# Patient Record
Sex: FEMALE | Race: WHITE | Employment: OTHER | URBAN - METROPOLITAN AREA
[De-identification: names, ages, dates, MRNs, and addresses within clinical notes are randomized per-mention and may not be internally consistent; named-entity substitution may affect disease eponyms.]

---

## 2018-04-23 ENCOUNTER — HOSPITAL ENCOUNTER (OUTPATIENT)
Dept: CT IMAGING | Age: 58
Discharge: HOME | End: 2018-04-23

## 2018-04-23 DIAGNOSIS — M51.36 DISC DEGENERATION, LUMBAR: ICD-10-CM

## 2018-04-23 DIAGNOSIS — M47.816 LUMBAR SPONDYLOSIS: ICD-10-CM

## 2020-01-27 PROCEDURE — 88305 TISSUE EXAM BY PATHOLOGIST: CPT

## 2020-01-28 ENCOUNTER — HOSPITAL ENCOUNTER (OUTPATIENT)
Dept: LAB | Age: 60
Discharge: HOME | End: 2020-01-28

## 2021-11-17 ENCOUNTER — TRANSCRIBE ORDER (OUTPATIENT)
Dept: SCHEDULING | Age: 61
End: 2021-11-17

## 2021-11-17 DIAGNOSIS — Z12.31 SCREENING MAMMOGRAM FOR HIGH-RISK PATIENT: Primary | ICD-10-CM

## 2021-12-06 ENCOUNTER — HOSPITAL ENCOUNTER (OUTPATIENT)
Dept: MAMMOGRAPHY | Age: 61
Discharge: HOME | End: 2021-12-06
Attending: INTERNAL MEDICINE
Payer: MEDICARE

## 2021-12-06 DIAGNOSIS — Z12.31 SCREENING MAMMOGRAM FOR HIGH-RISK PATIENT: ICD-10-CM

## 2021-12-06 PROCEDURE — 77063 BREAST TOMOSYNTHESIS BI: CPT

## 2021-12-13 ENCOUNTER — HOSPITAL ENCOUNTER (OUTPATIENT)
Dept: MAMMOGRAPHY | Age: 61
Discharge: HOME | End: 2021-12-13
Attending: INTERNAL MEDICINE
Payer: MEDICARE

## 2021-12-13 DIAGNOSIS — R92.8 ABNORMAL SCREENING MAMMOGRAM: ICD-10-CM

## 2021-12-13 PROCEDURE — 77061 BREAST TOMOSYNTHESIS UNI: CPT

## 2021-12-13 PROCEDURE — 76642 ULTRASOUND BREAST LIMITED: CPT

## 2022-03-09 ENCOUNTER — APPOINTMENT (OUTPATIENT)
Dept: PHYSICAL THERAPY | Age: 62
End: 2022-03-09

## 2022-03-14 ENCOUNTER — HOSPITAL ENCOUNTER (OUTPATIENT)
Dept: PHYSICAL THERAPY | Age: 62
Discharge: HOME | End: 2022-03-14
Payer: MEDICARE

## 2022-03-14 PROCEDURE — 97110 THERAPEUTIC EXERCISES: CPT

## 2022-03-14 PROCEDURE — 97162 PT EVAL MOD COMPLEX 30 MIN: CPT

## 2022-03-14 PROCEDURE — 97140 MANUAL THERAPY 1/> REGIONS: CPT

## 2022-03-14 NOTE — THERAPY EVALUATION
Corrina Cain  : 1960  [place]  [place] [address] at [place] [id number], [address] [place] [address], [place], [address] [place] [address]  [phone]   [phone]       OUTPATIENT PHYSICAL THERAPY:Initial Assessment 3/14/2022    ICD-10: Pain in Right Shoulder (M25.511)  Adhesive capsulitis of right shoulder (M75.01)  Primary Osteoarthritis right shoulder (M19.011)  Precautions: Type II Diabetes, Hypertension- both controlled with medication; neurostimulator for bladder implanted ; history of bilateral shoulder surgeries  Allergies: Adhesive, Ambien [zolpidem], Keflex [cephalexin], and Morphine   TREATMENT PLAN:  Effective Dates: 3/14/2022 TO 2022 (60 days). Frequency/Duration: 2 times a week for 60 Day(s) MEDICAL/REFERRING DIAGNOSIS:  Adhesive capsulitis of right shoulder [M75.01]  Primary osteoarthritis, right shoulder [M19.011]   DATE OF ONSET: Patient reports history of R shoulder pain since a fall in 2019. Underwent R shoulder arthroscopy in 2021. REFERRING PHYSICIAN: Florentino Martinez MD MD Orders: Evaluate and Treat   Return MD Appointment: 2022     INITIAL ASSESSMENT:  Ms. Jamey Cain is a 63 y.o. female presenting to physical therapy with complaints of R shoulder pain and stiffness. She also reports some neck and L UE symptoms as well. History of L shoulder surgery x 2, and R shoulder arthroscopy in 2021. She reports no attending physical therapy after her R shoulder surgery due to circumstances with family and traveling. Patient and her  are now retired and back in [facility] for [facility] and she is wishing to improve her R shoulder pain, ROM, strength, and overall functional use. Patient with significant history of orthopedic pains, bladder neurostimulator, and neck/ back problems. Patient also reports multiple falls and ambulates with a straight cane in her R UE for stability.  She is eager to reduce her pain and improve her mobility in order to return to her prior level of independence and activities. Patient presents with increased pain, decreased strength, decreased ROM, decreased flexibility, impaired gait, impaired posture, impaired overhead reach, impaired transfer ability, decreased activity tolerance, and overall impaired functional mobility. Patient is a good candidate for skilled physical therapy interventions to include manual therapy, therapeutic exercise, transfer training, postural re-education, body mechanics training, and pain modalities as needed. PROBLEM LIST (Impacting functional limitations):  1. Decreased Strength  2. Decreased ADL/Functional Activities  3. Decreased Transfer Abilities  4. Decreased Ambulation Ability/Technique  5. Increased Pain  6. Decreased Activity Tolerance  7. Decreased Pacing Skills  8. Decreased Work Simplification/Energy Conservation Techniques  9. Decreased Flexibility/Joint Mobility  10. Edema/Girth INTERVENTIONS PLANNED: (Treatment may consist of any combination of the following)  1. Cold  2. Family Education  3. Heat  4. Home Exercise Program (HEP)  5. Manual Therapy  6. Range of Motion (ROM)  7. Therapeutic Activites  8. Therapeutic Exercise/Strengthening  9. Transfer Training  10. Trigger Point Dry Needling     GOALS: (Goals have been discussed and agreed upon with patient.)  Short-Term Functional Goals: Time Frame: 3/14/2022 to 4/13/2022  1. Patient demonstrates independence with home exercise program without verbal cueing provided by therapist.   2. Patient will report no more than 3/10 R shoulder pain at rest in order to demonstrate improved self pain control and tolerance. 3. Patient will be educated in and demonstrate improved upright posture including decreased forward head and shoulders to improve clearance for overhead activities.    4. Patient will improve R shoulder elevation to at least 150 degrees in order to improve overhead motion and reach.  Discharge Goals: Time Frame: 3/14/2022 to 5/13/2022  1. Patient will improve gross R shoulder strength to at least 4+/5 in order to improve safety with lifting, reaching, carrying, and overhead activities. 2. Patient will improve R functional internal rotation to L1 in order to improve ability to improve independence with donning/ doffing bra and washing back. 3. Patient will improve R functional external rotation to C7 in order to improve ability to wash her hair independently. 4. Patient will be able to sleep through the night without waking due to R shoulder pain in order to return to prior sleeping pattern for health and wellness. 5. Patient will be able to perform housework, including reaching into cabinets, with minimal to no complaints in order to return to full functional use of R UE.  6. Patient will improve Disability of the Arm, Shoulder, and Hand score to 25/55 from 36/55. Outcome Measure: Tool Used: Disabilities of the Arm, Shoulder and Hand (DASH) Questionnaire - Quick Version  Score:  Initial: 36/55  Most Recent: X/55 (Date: -- )   Interpretation of Score: The DASH is designed to measure the activities of daily living in person's with upper extremity dysfunction or pain. Each section is scored on a 1-5 scale, 5 representing the greatest disability. The scores of each section are added together for a total score of 55. Ambulatory/Rehab Services H2 Model Falls Risk Assessment   Risk Factors:       (5)  History of Recent Falls [w/in 3 months] Ability to Rise from Chair:       (1)  Pushes up, successful in one attempt   Falls Prevention Plan:       No modifications necessary   Total: (5 or greater = High Risk): 6   ©2010 [facility]. All Rights Reserved. [facility] Patent #[id number].  Federal Law prohibits the replication, distribution or use without written permission from [facility] [employer] Necessity:   · Patient is expected to demonstrate progress in strength, range of motion, coordination and functional technique to increase independence with dressing, bathing, reaching, and carrying items and improve safety during overhead activities, lifting, reaching, and return to prior activities including house work. · Skilled intervention continues to be required due to increased pain with decreased strength and functional mobility hindering return to prior activity level. Reason for Services/Other Comments:  · Patient continues to require skilled intervention due to decreased functional use of R UE with increased pain hindering quality of life and return to prior independence level. Total Evaluation Duration: 25 minutes    Rehabilitation Potential For Stated Goals: Good  Regarding Bj Floyd's therapy, I certify that the treatment plan above will be carried out by a therapist or under their direction. Thank you for this referral,  Roscoe Torres PT     Referring Physician Signature: Maryanne Cannon MD _______________________________ Date _____________             PAIN/SUBJECTIVE:    Initial: Pain Intensity 1: 5  Pain Location 1: Shoulder  Pain Orientation 1: Right  Post Session:  5/10    HISTORY:    History of Injury/Illness (Reason for Referral):  Ms. Lyle Smart is a 63 y.o. female presenting to physical therapy with complaints of R shoulder pain and stiffness. She also reports some neck and L UE symptoms as well. History of L shoulder surgery x 2, and R shoulder arthroscopy in April 2021. She reports no attending physical therapy after her R shoulder surgery due to circumstances with family and traveling. Patient and her  are now retired and back in [place] for good and she is wishing to improve her R shoulder pain, ROM, strength, and overall functional use. Patient with significant history of orthopedic pains, bladder neurostimulator, and neck/ back problems. Patient also reports multiple falls and ambulates with a straight cane in her R UE for stability. She is eager to reduce her pain and improve her mobility in order to return to her prior level of independence and activities. Patient presents with increased pain, decreased strength, decreased ROM, decreased flexibility, impaired gait, impaired posture, impaired overhead reach, impaired transfer ability, decreased activity tolerance, and overall impaired functional mobility. Past Medical History/Comorbidities:   Ms. Kb Orellana  has a past medical history of Asthma, Chronic pain, COVID, Depression, Diabetes ([address] Headache, Hypercholesterolemia, Hypertension, Lymphadenopathy, Neurogenic bladder, Psychotic disorder ([address] and PUD (peptic ulcer disease). Ms. Kb Orellana  has a past surgical history that includes hx heent; hx gyn; and hx urological.  Social History/Living Environment:     Patient lives in a private residence with her  who can help her out as needed. Requires assistance with dressing at times and bathing- mostly washing her hair. Prior Level of Function/Work/Activity:  Retired. Dominant Side:         RIGHT    Current Medications:        Current Outpatient Medications:     lisinopriL (PRINIVIL, ZESTRIL) 10 mg tablet, Take 10 mg by mouth daily. , Disp: , Rfl:     Desvenlafaxine (Pristiq) 100 mg Tb24, Take  by mouth daily. , Disp: , Rfl:     metoprolol succinate (Toprol XL) 100 mg tablet, Take  by mouth daily. , Disp: , Rfl:     chlorthalidone (HYGROTON) 25 mg tablet, Take  by mouth daily. , Disp: , Rfl:     potassium chloride SA (MICRO-K) 10 mEq capsule, Take 10 mEq by mouth two (2) times a day., Disp: , Rfl:     SITagliptin-metFORMIN (Janumet) 50-1,000 mg per tablet, Take 1 Tablet by mouth daily (with breakfast). , Disp: , Rfl:     traZODone (DESYREL) 100 mg tablet, Take 100 mg by mouth nightly., Disp: , Rfl:     rosuvastatin (Crestor) 20 mg tablet, Take 20 mg by mouth nightly., Disp: , Rfl:     Cetirizine (ZyrTEC) 10 mg cap, Take  by mouth., Disp: , Rfl:     tiZANidine (Zanaflex) 4 mg tablet, Take 4 mg by mouth three (3) times daily as needed for Muscle Spasm(s). Takes qhs, Disp: , Rfl:     rizatriptan (Maxalt-MLT) 5 mg rapid dissolve tablet, Take  by mouth., Disp: , Rfl:     Date Last Reviewed:  3/14/2022    Number of Personal Factors/Comorbidities that affect the Plan of Care:  Based on past medical history and co-morbidities 1-2: MODERATE COMPLEXITY    EXAMINATION:    Patient denies any headaches, changes in vision, dizziness, vertigo, nausea, drop attacks, black outs, tinnitus, dysphagia, dysarthria, LE symptoms or bowel/bladder dysfunction. Observation/Orthostatic Postural Assessment:          Patient with forward head, rounded shoulders, R scapular protraction and depression, increased muscle tension bilateral upper trapezius, L scapular winging noted. Palpation:          Moderate tenderness to palpation to gross R glenohumeral joint, AC joint, and scapular region. Significant tightness bilateral upper trapezius with trigger points and tenderness noted. ROM:          NT = not tested  AROM/ PROM Left (degrees) Right (degrees)   Shoulder Flexion 155 135   Shoulder Abduction 120 90   Shoulder Internal Rotation  70 60   Functional Internal Rotation T10 L5   Shoulder External Rotation 40 45   Functional External Rotation C7 Back of head     Strength: Motion Tested Left   (*/5) Right  (*/5)   Shoulder Flexion 5 3+   Scaption 5 4   Shoulder Abduction 5 4+   Shoulder Internal Rotation  5 4   Shoulder External Rotation 5 4-   Elbow Flexion 5 5   Elbow Extension 5 5     Special Tests:         Patient with decent rotator cuff strength R UE, but presents with painful arc, impingement symptoms, and signs and symptoms associated with adhesive capsulitis  Passive Accessory Motion:        Significant tightness through glenohumeral joint mobilizations all directions, worst posterior and inferior. Neurological Screen:              Myotomes: Key muscle strength testing through [facility]    Dermatomes: Sensation to light touch for bilateral UE is intact from C4 to T2. Reflexes: Biceps (C5): 2+ bilaterally         Brachioradialis (C6): 2+ bialterally        Triceps (C7): 2+ bilaterally  Functional Mobility:         Patient ambulates with straight cane in R UE for stability, but reports some R UE pain using cane. Difficulty dressing- donning/ doffing bra, bathing- washing back and hair, pain with driving, waking 2 times a night due to R shoulder pain, and limited with housework/ reaching overhead with R UE. Balance:          Sitting balance intact. Standing balance limited. HIGH FALL RISK. Body Structures Involved:  1. Nerves  2. Bones  3. Joints  4. Muscles  5. Ligaments Body Functions Affected:  1. Sensory/Pain  2. Neuromusculoskeletal  3. Movement Related Activities and Participation Affected:  1. General Tasks and Demands  2. Mobility  3. Self Care  4. Interpersonal Interactions and Relationships  5.  Community, Social and [place]    Number of elements (examined above) that affect the Plan of Care: 3: MODERATE COMPLEXITY    CLINICAL PRESENTATION:    Presentation:   Evolving clinical presentation with changing clinical characteristics: MODERATE COMPLEXITY    CLINICAL DECISION MAKING:    Use of outcome tool(s) and clinical judgement create a POC that gives a: Questionable prediction of patient's progress: MODERATE COMPLEXITY

## 2022-03-14 NOTE — PROGRESS NOTES
Kami Cain  : 1960  [place]  Secondary: [facility] at [facility]  [address] [place], [address] [place] [address], [place], [address] [place] [address]  [phone]   [phone]      OUTPATIENT PHYSICAL THERAPY: Daily Treatment Note 3/14/2022    ICD-10: Pain in Right Shoulder (M25.511)  Adhesive capsulitis of right shoulder (M75.01)  Primary Osteoarthritis right shoulder (M19.011)  Precautions: Type II Diabetes, Hypertension- both controlled with medication; neurostimulator for bladder implanted ; history of bilateral shoulder surgeries  Allergies: Adhesive, Ambien [zolpidem], Keflex [cephalexin], and Morphine   TREATMENT PLAN:  Effective Dates: 3/14/2022 TO 2022 (60 days). Frequency/Duration: 2 times a week for 60 Day(s) MEDICAL/REFERRING DIAGNOSIS:  Adhesive capsulitis of right shoulder [M75.01]  Primary osteoarthritis, right shoulder [M19.011]   DATE OF ONSET: Patient reports history of R shoulder pain since a fall in 2019. Underwent R shoulder arthroscopy in 2021. REFERRING PHYSICIAN: Eva Quintero MD MD Orders: Evaluate and Treat   Return MD Appointment: 2022     Pre-treatment Symptoms/Complaints:  Patient reports not being able to do physical therapy after her R shoulder surgery due to family circumstances and moving, but is now back in town and ready to decrease her pain and improve her R UE mobility and function. Pain: Initial: Pain Intensity 1: 5  Pain Location 1: Shoulder  Pain Orientation 1: Right  Post Session:  5/10   Medications Last Reviewed:  3/14/2022  Updated Objective Findings:  See evaluation note from today   TREATMENT:   THERAPEUTIC EXERCISE: (15 minutes):  Exercises per grid below to improve mobility, strength, coordination and dynamic movement of shoulder - right to improve functional lifting, carrying, reaching and overhead activites.   Required moderate visual, verbal, manual and tactile cues to promote proper body alignment, promote proper body posture and promote proper body mechanics. Progressed resistance, range, repetitions and complexity of movement as indicated. Date:  3/14/2022 Date:   Date:     Activity/Exercise Parameters Parameters Parameters   Wand external rotation Supine, 10 x 10 seconds     Wand flexion Supine, 10 x 10 seconds     Wand abduction Seated, 10 x 10 seconds     Backwards shoulder rolls X 10     Scapular retraction X 10                   Time spent with patient reviewing proper muscle recruitment and technique with exercises. MANUAL THERAPY: (10 minutes): Joint mobilization and Soft tissue mobilization was utilized and necessary because of the patient's restricted joint motion, painful spasm, loss of articular motion and restricted motion of soft tissue   Supine PROM to R shoulder all directions, to tolerance, to improve ROM   Gentle oscillations in neutral to decrease pain and improve relaxation   Grade II-II posterior and inferior R glenohumeral joint mobilizations to decrease pain and improve mobility  Consider soft tissue mobilization to gross R shoulder, upper trapezius, and lateral arm to decrease tightness and pain    MODALITIES: (0 minutes):      none today     HEP: As above; handouts given to patient for all exercises. Treatment/Session Summary:    · Response to Treatment:  Patient with good understanding and performance of exercises for home program. Reviewed safety with HEP. Advised patient that consistency is very important to improve ROM and we would being with ROM and posture and progress to strengthening as tolerated.   · Communication/Consultation:  Spoke at length with patient about plan of care, progression of therapy, and importance of HEP  · Equipment provided today:  Patient given handout with above exercises for home  · Recommendations/Intent for next treatment session: Next visit will focus on pain control, manual therapy and modalities as needed, progression of ROM and postural exercises, addition of strengthening and functional exercise as tolerated.     Total Treatment Billable Duration:  50 minutes: 25 evaluation, 15 manual, 10 therapeutic exercise  PT Patient Time In/Time Out  Time In: 1110  Time Out: 1 [address] Elizabeth Norris, PT

## 2022-03-15 ENCOUNTER — HOSPITAL ENCOUNTER (OUTPATIENT)
Dept: PHYSICAL THERAPY | Age: 62
Discharge: HOME | End: 2022-03-15
Payer: MEDICARE

## 2022-03-15 PROCEDURE — 97140 MANUAL THERAPY 1/> REGIONS: CPT

## 2022-03-15 PROCEDURE — 97110 THERAPEUTIC EXERCISES: CPT

## 2022-03-15 NOTE — PROGRESS NOTES
Tung Cain  : 1960  [place]  Secondary: [facility]  [address], [address] [place] [address], [place] [facility], [address] [place] [address]  [phone]   [phone]      OUTPATIENT PHYSICAL THERAPY: Daily Treatment Note 3/15/2022    ICD-10: Pain in Right Shoulder (M25.511)  Adhesive capsulitis of right shoulder (M75.01)  Primary Osteoarthritis right shoulder (M19.011)  Precautions: Type II Diabetes, Hypertension- both controlled with medication; neurostimulator for bladder implanted ; history of bilateral shoulder surgeries  Allergies: Adhesive, Ambien [zolpidem], Keflex [cephalexin], and Morphine   TREATMENT PLAN:  Effective Dates: 3/14/2022 TO 2022 (60 days). Frequency/Duration: 2 times a week for 60 Day(s) MEDICAL/REFERRING DIAGNOSIS:  Adhesive capsulitis of right shoulder [M75.01]  Primary osteoarthritis, right shoulder [M19.011]   DATE OF ONSET: Patient reports history of R shoulder pain since a fall in 2019. Underwent R shoulder arthroscopy in 2021. REFERRING PHYSICIAN: Ruth Ann Shearer MD MD Orders: Evaluate and Treat   Return MD Appointment: 2022     Pre-treatment Symptoms/Complaints:  Pt. Reported moderate shoulder pain due to tightness and weakness. Pain: Initial: Pain Intensity 1: 5  Pain Location 1: Shoulder  Pain Orientation 1: Left,Right  Post Session:  5/10 less tightness after session   Medications Last Reviewed:  3/15/2022  Updated Objective Findings:  Pt had clicking in right lateral elbow with shoulder flexion PROM   TREATMENT:   THERAPEUTIC EXERCISE: (30 minutes):  Exercises per grid below to improve mobility, strength, coordination and dynamic movement of shoulder - right to improve functional lifting, carrying, reaching and overhead activites.   Required moderate visual, verbal, manual and tactile cues to promote proper body alignment, promote proper body posture and promote proper body mechanics. Progressed resistance, range, repetitions and complexity of movement as indicated. Date:  3/14/2022 Date:  3/15/22 Date:     Activity/Exercise Parameters Parameters Parameters   Wand external rotation Supine, 10 x 10 seconds Supine x 20 reps     Wand flexion Supine, 10 x 10 seconds Supine x 20 reps     Wand abduction Seated, 10 x 10 seconds Seated x 20 reps    Backwards shoulder rolls X 10 X 20 reps     Scapular retraction X 10 X 20 reps     Wall pulley  X 5 mins flexion & Scaption    Wand protraction   With wand x 20 reps in supine     internal rotation stretch  Strap 4x30 sec hold     Shoulder shrugs  X 20 reps                         Time spent with patient reviewing proper muscle recruitment and technique with exercises. MANUAL THERAPY: (25 minutes): Joint mobilization and Soft tissue mobilization was utilized and necessary because of the patient's restricted joint motion, painful spasm, loss of articular motion and restricted motion of soft tissue   Supine PROM to R shoulder all directions, to tolerance, to improve ROM   Gentle oscillations in neutral to decrease pain and improve relaxation   Grade II-II posterior and inferior R glenohumeral joint mobilizations to decrease pain and improve mobility ( not today)    soft tissue mobilization to gross R shoulder, upper trapezius, and lateral arm to decrease tightness and pain    MODALITIES: (8 minutes):      Pt. received moist hot pack to bilateral shoulders to decrease muscular tightness     HEP: As above; handouts given to patient for all exercises. Treatment/Session Summary:    · Response to Treatment:  Pt. was compliant with all exercises and had no increase in pain . Pt. presented excellent range of motion in bilateral shoulders. .  · Communication/Consultation:  Spoke at length with patient about plan of care, progression of therapy, and importance of HEP  · Equipment provided today:  Patient given handout with above exercises for home  · Recommendations/Intent for next treatment session: Next visit will focus on pain control, manual therapy and modalities as needed, progression of ROM and postural exercises, addition of strengthening and functional exercise as tolerated.     Total Treatment Billable Duration:  55 mins  PT Patient Time In/Time Out  Time In: 0800  Time Out: 0905  Daylin Martínez PTA

## 2022-03-21 ENCOUNTER — HOSPITAL ENCOUNTER (OUTPATIENT)
Dept: PHYSICAL THERAPY | Age: 62
Discharge: HOME | End: 2022-03-21
Payer: MEDICARE

## 2022-03-21 PROCEDURE — 97140 MANUAL THERAPY 1/> REGIONS: CPT

## 2022-03-21 PROCEDURE — 97110 THERAPEUTIC EXERCISES: CPT

## 2022-03-21 NOTE — PROGRESS NOTES
Nancy Cain  : 1960  [place]  Secondary: [facility] at [facility]  [address] [place], [address] [place] [address], [place], [address] [place] [address]  [phone]   [phone]      OUTPATIENT PHYSICAL THERAPY: Daily Treatment Note 3/21/2022    ICD-10: Pain in Right Shoulder (M25.511)  Adhesive capsulitis of right shoulder (M75.01)  Primary Osteoarthritis right shoulder (M19.011)  Precautions: Type II Diabetes, Hypertension- both controlled with medication; neurostimulator for bladder implanted ; history of bilateral shoulder surgeries  Allergies: Adhesive, Ambien [zolpidem], Keflex [cephalexin], and Morphine   TREATMENT PLAN:  Effective Dates: 3/14/2022 TO 2022 (60 days). Frequency/Duration: 2 times a week for 60 Day(s) MEDICAL/REFERRING DIAGNOSIS:  Adhesive capsulitis of right shoulder [M75.01]  Primary osteoarthritis, right shoulder [M19.011]   DATE OF ONSET: Patient reports history of R shoulder pain since a fall in 2019. Underwent R shoulder arthroscopy in 2021. REFERRING PHYSICIAN: Sukhi Yanes MD MD Orders: Evaluate and Treat   Return MD Appointment: 2022     Pre-treatment Symptoms/Complaints:  Pt. Reported seeing Dr. Yarely Sharp today. Pt. Stated he wanted to perform surgery on her cervical spine by the end of March. Pt. Stated Dr. Valerio Villatoro wanted her to not use the cane anymore as it could hinder her recovery from C-spine surgery.      Pain: Initial: Pain Intensity 1: 6  Pain Location 1: Shoulder  Pain Orientation 1: Left,Right  Post Session:  5/10 less tightness after session   Medications Last Reviewed:  3/21/2022  Updated Objective Findings:  Pt. was able to ambulate today without cane with minimal antallgic gait Pattern   TREATMENT:   THERAPEUTIC EXERCISE: (30 minutes):  Exercises per grid below to improve mobility, strength, coordination and dynamic movement of shoulder - right to improve functional lifting, carrying, reaching and overhead activites. Required moderate visual, verbal, manual and tactile cues to promote proper body alignment, promote proper body posture and promote proper body mechanics. Progressed resistance, range, repetitions and complexity of movement as indicated. Date:  3/14/2022 Date:  3/15/22 Date:  3/21/22   Activity/Exercise Parameters Parameters Parameters   Wand external rotation Supine, 10 x 10 seconds Supine x 20 reps  Supine x 25 reps    Wand flexion Supine, 10 x 10 seconds Supine x 20 reps  Supine x 25 reps    Wand abduction Seated, 10 x 10 seconds Seated x 20 reps    Backwards shoulder rolls X 10 X 20 reps  X 20 reps    Scapular retraction X 10 X 20 reps  X 20 reps    Wall pulley  X 5 mins flexion & Scaption X 5 mins flexion & scaption   Wand protraction   With wand x 20 reps in supine With wand x 20 reps in supine    internal rotation stretch  Strap 4x30 sec hold  Strap 4x30 sec hold    Shoulder shrugs  X 20 reps  X 20 reps                        Time spent with patient reviewing proper muscle recruitment and technique with exercises. MANUAL THERAPY: (25 minutes): Joint mobilization and Soft tissue mobilization was utilized and necessary because of the patient's restricted joint motion, painful spasm, loss of articular motion and restricted motion of soft tissue   Supine PROM to R shoulder all directions, to tolerance, to improve ROM   Gentle oscillations in neutral to decrease pain and improve relaxation   Grade II-II posterior and inferior R glenohumeral joint mobilizations to decrease pain and improve mobility ( not today)    soft tissue mobilization to gross R shoulder, upper trapezius, and lateral arm to decrease tightness and pain    MODALITIES: (0 minutes):      none today     HEP: As above; handouts given to patient for all exercises.     Treatment/Session Summary:    · Response to Treatment:  Pt. was compliant with all exercises including gait with no holli..  · Communication/Consultation:  Spoke at length with patient about plan of care, progression of therapy, and importance of HEP  · Equipment provided today:  Patient given handout with above exercises for home  · Recommendations/Intent for next treatment session: Next visit will focus on pain control, manual therapy and modalities as needed, progression of ROM and postural exercises, addition of strengthening and functional exercise as tolerated.     Total Treatment Billable Duration:  55 mins  PT Patient Time In/Time Out  Time In: 1430  Time Out: Marlin [address], PTA

## 2022-03-21 NOTE — H&P (VIEW-ONLY)
[facility] NOTE:   History of Present Illness:    CC: Neck pain    Herlinda Blanco is a 63 y.o. right-hand-dominant female who presents today for evaluation of chronic neck pain and low back pain. The patient has been evaluated in multiple areas as she moves around between [place], [place] and [facility].  She has had pain in her neck since 2019 where she had a fall and has been progressively getting worse. Certas the base of her neck and goes up to her skull. She has numbness and tingling in both arms and the patient states that her left upper extremity is a predominantly affected limb. He has a history of 2 carpal tunnel surgeries performed approximately 10 years ago. She states the neck pain is constant but the arm numbness in positional and comes and goes. She states that things such as driving and resting her elbow tend to make her symptoms and numbness worse. She plays the piano professionally. *She states she has had stem cell injections, rhizotomies and steroid injections which have not seemed to provide her relief. She no longer wishes to take pain medication so no longer sees a pain specialist.  The patient has an MRI cervical spine without contrast performed at [facility] outpatient clinic on 1/12/2022 that demonstrates cervical spondylosis and degenerative disc disease at C5-C6 and C6-C7. At C5-C6 there is spinal stenosis with bilateral neuroforaminal stenosis in AP diameter the canal measures approximately 7.2 mm on axial images and 8.6 mm on sagittal.  At C5-C6 there is severe left-sided neuroforaminal stenosis and moderate right-sided neuroforaminal stenosis. At C6-C7 there is also spondylosis in effacement of the ventral CSF space without significant compression dorsally.   She has also worked with physical therapy multiple times throughout the year and is currently undergoing more therapy for her neck and shoulder and she had a recent shoulder arthroscopy. She denies that physical therapy has ever significant relieved her pain and discomfort. The patient underwent a EMG of the bilateral upper extremity that revealed evidence concerning for radiculopathy originating at either C6 or C7. Past Medical History:   Diagnosis Date    Asthma     Chronic pain     COVID     Depression     Diabetes ([address]     Headache     Hypercholesterolemia     Hypertension     Lymphadenopathy     [place] [id number]    Neurogenic bladder     Psychotic disorder (HCC)     bipolar    PUD (peptic ulcer disease)     hiatal hernia      Past Surgical History:   Procedure Laterality Date    HX GYN      KOFI    HX HEENT      tonsils parotid    HX UROLOGICAL      bladder stimulator-Segundo x5     Allergies   Allergen Reactions    Adhesive Other (comments)     blister    Ambien [Zolpidem] Other (comments)     Short term memory loss    Keflex [Cephalexin] Rash     itching    Morphine Other (comments)     Hallucinations, itching      Family History   Problem Relation Age of Onset    Hypertension Father     Diabetes Father     Heart Disease Father     Parkinson's Disease Father     Breast Cancer Neg Hx       Social History     Socioeconomic History    Marital status:      Spouse name: Not on file    Number of children: Not on file    Years of education: Not on file    Highest education level: Not on file   Occupational History    Not on file   Tobacco Use    Smoking status: Former Smoker     Years: 0.50    Smokeless tobacco: Never Used   Vaping Use    Vaping Use: Never used   Substance and Sexual Activity    Alcohol use:  Yes     Alcohol/week: 1.0 standard drink     Types: 1 Glasses of wine per week    Drug use: Never    Sexual activity: Not on file   Other Topics Concern    Not on file   Social History Narrative    Not on file     Social Determinants of Health     Financial Resource Strain:     Difficulty of Paying Living Expenses: Not on file   Food Insecurity:     Worried About Running Out of Food in the Last Year: Not on file    Moise of Food in the Last Year: Not on file   Transportation Needs:     Lack of Transportation (Medical): Not on file    Lack of Transportation (Non-Medical): Not on file   Physical Activity:     Days of Exercise per Week: Not on file    Minutes of Exercise per Session: Not on file   Stress:     Feeling of Stress : Not on file   Social Connections:     Frequency of Communication with Friends and Family: Not on file    Frequency of Social Gatherings with Friends and Family: Not on file    Attends [employer] Services: Not on file    Active Member of [address] [place] [address] [employer] or Organizations: Not on file    Attends Club or Organization Meetings: Not on file    Marital Status: Not on file   Intimate Partner Violence:     Fear of Current or Ex-Partner: Not on file    Emotionally Abused: Not on file    Physically Abused: Not on file    Sexually Abused: Not on file   Housing Stability:     Unable to Pay for Housing in the Last Year: Not on file    Number of [place] in the Last Year: Not on file    Unstable Housing in the Last Year: Not on file     Current Outpatient Medications   Medication Sig Dispense Refill    OTHER methylfolate qhs      lisinopriL (PRINIVIL, ZESTRIL) 10 mg tablet Take 10 mg by mouth daily.  Desvenlafaxine (Pristiq) 100 mg Tb24 Take  by mouth daily.  metoprolol succinate (Toprol XL) 100 mg tablet Take  by mouth daily.  chlorthalidone (HYGROTON) 25 mg tablet Take  by mouth daily.  potassium chloride SA (MICRO-K) 10 mEq capsule Take 10 mEq by mouth two (2) times a day.  SITagliptin-metFORMIN (Janumet) 50-1,000 mg per tablet Take 1 Tablet by mouth daily (with breakfast).  traZODone (DESYREL) 100 mg tablet Take 100 mg by mouth nightly.  rosuvastatin (Crestor) 20 mg tablet Take 20 mg by mouth nightly.  Cetirizine (ZyrTEC) 10 mg cap Take  by mouth.       tiZANidine (Zanaflex) 4 mg tablet Take 4 mg by mouth three (3) times daily as needed for Muscle Spasm(s). Takes qhs      rizatriptan (Maxalt-MLT) 5 mg rapid dissolve tablet Take  by mouth. There is no problem list on file for this patient. ROS  Review of Systems    Constitutional:                    No recent weight changes, fever, fatigue, sleep difficulties, loss of appetite   ENT/Mouth:  No hearing loss, ringing in the ears, chronic sinus problem, nose bleeds,sore throat, voice change, hoarseness, swollen glands in neck, or difficulties with chewing and swallowing. Cardiovascular:  No chest pain/angina pectoris, palpitations, swelling of feet/ankles/hands, or calf pain while walking. Respiratory: No chronic or frequent coughs, spitting up blood, shortness of breath, asthma, or wheezing. Gastrointestinal: No a bdominal pain, heartburn, nausea, vomiting, constipation, or frequent diarrhea     Genitourinary: POS frequent urination, burning or painful urination, or blood in urine     Musculoskeletal:   POS joint pain, stiffness/swelling, weakness of muscles, or muscle pain/cramp     Integument:   No rash/itching     Neurological:  No dizziness/vertigo, POS numbness/tingling sensation, tremors, weakness in limbs, frequent or recurring headaches, memory loss or confusion. Endocrine: POS excessive thirst or urination, heat or cold intolerance.                Physical Exam:  Vitals:    03/21/22 0817   BP: 114/65   Pulse: 68   Temp: 97.7 °F (36.5 °C)   TempSrc: Temporal   SpO2: 98%   Weight: 191 lb (86.6 kg)   Height: 5' 6\" (1.676 m)   PainSc:   6   PainLoc: Neck     General: No acute distress  Head normocephalic and atraumatic  Mood and affect appropriate  CV: Regular rate   Resp: No increased work of breathing  Skin: warm and dry   Awake, alert, and oriented   Speech fluent  Eyes open spontaneously   Face symmetric and tongue midline on protrusion  Sternocleidomastoid and trapezius 5/5  No mid-line cervical, thoracic, or lumbar tenderness to palpation   Patient with strength exam as follows:   Upper Extremities: Right Left      Deltoid  5 5    Biceps  5 5    Triceps 5 5      5 5   Hand Intrinsics  5 5  Wrist flexors/extensors  5 5     Lower Extremities:      Hip Flex 5 4+    Quads  5 5    Hamstrings 5 5    Dorsiflex 5 5    Plantarflex 5 5    EHL  5 5  Sensation intact to light touch and pin-prick   DTR 2+  No clonus or babinski present   No Lewis's sign present bilaterally   Gait ambulates with an antalgic gait and slight instability uses a cane for assistance    Assessment & Plan:  Romeo Cantu is a 63 y.o. right-hand-dominant female who presents today for evaluation of chronic neck pain and low back pain. I have independently reviewed and interpreted the patient's MRI cervical spine without contrast performed at [facility] outpatient clinic on 1/12/2022 that demonstrates cervical spondylosis and degenerative disc disease at C5-C6 and C6-C7. At C5-C6 there is spinal stenosis with bilateral neuroforaminal stenosis in AP diameter the canal measures approximately 7.2 mm on axial images and 8.6 mm on sagittal.  At C5-C6 there is severe left-sided neuroforaminal stenosis and moderate right-sided neuroforaminal stenosis. At C6-C7 there is also spondylosis in effacement of the ventral CSF space without significant compression dorsally. And a this time I discussed with the patient that she has failed conservative medical measures has evidence of significant neuroforaminal stenosis at C5-C6 that is worse on the left but also significant on the right and spinal stenosis. I believe she would benefit from a C5-C6 anterior cervical discectomy and fusion for decompression of the cervical spine and neuroforamen.  Anterior Cervical Fusion Procedures Consent: The relative risks of complication include but are not limited to infection, bleeding, spinal fluid leak, major vascular injury, increased pain, weakness, numbness, non fusion, instrumentation failure, need for re operation, postoperative hematoma that may require surgical evacuation, dysphonia, dysphagia, carotid, jugular, esophogeal and/or tracheal, paralysis, incontinence, impotence, heart attack, stroke and death were discussed with patient  And family members present. They have been provided the opportunity to ask any questions regarding the surgical procedure. The patient and family members present expressed understanding and agree to proceed with surgery. I also discussed with her the risk of dysphonia in the form of hoarseness to whether transient or permanent that is associated with anterior cervical surgery. Patient understands the risk and benefits and agrees to proceed with surgery as outlined above. ICD-10-CM ICD-9-CM    1. Cervical spinal stenosis  M48.02 723.0    2. Degenerative disc disease, cervical  M50.30 722.4    3. Cervical radiculopathy  M54.12 723.4    4. Neuroforaminal stenosis of cervical spine  M48.02 723.0        Notes are transcribed with [employer], a medical voice recording dictation service, and may contain minor errors. Maria T Rosario.  Diaz Dodson, [address] [place] [address]

## 2022-03-23 ENCOUNTER — HOSPITAL ENCOUNTER (OUTPATIENT)
Dept: PHYSICAL THERAPY | Age: 62
Discharge: HOME | End: 2022-03-23
Payer: MEDICARE

## 2022-03-23 PROCEDURE — 97140 MANUAL THERAPY 1/> REGIONS: CPT

## 2022-03-23 PROCEDURE — 97110 THERAPEUTIC EXERCISES: CPT

## 2022-03-23 NOTE — PROGRESS NOTES
Gio Webb Concepcion Ant  : 1960  [place]  [place] [address] at [facility]  [address] [place] [address], [address]  [phone]   [phone]      OUTPATIENT PHYSICAL THERAPY: Daily Treatment Note 3/23/2022    ICD-10: Pain in Right Shoulder (M25.511)  Adhesive capsulitis of right shoulder (M75.01)  Primary Osteoarthritis right shoulder (M19.011)  Precautions: Type II Diabetes, Hypertension- both controlled with medication; neurostimulator for bladder implanted ; history of bilateral shoulder surgeries  Allergies: Adhesive, Ambien [zolpidem], Keflex [cephalexin], and Morphine   TREATMENT PLAN:  Effective Dates: 3/14/2022 TO 2022 (60 days). Frequency/Duration: 2 times a week for 60 Day(s) MEDICAL/REFERRING DIAGNOSIS:  Adhesive capsulitis of right shoulder [M75.01]  Primary osteoarthritis, right shoulder [M19.011]   DATE OF ONSET: Patient reports history of R shoulder pain since a fall in 2019. Underwent R shoulder arthroscopy in 2021. REFERRING PHYSICIAN: Claribel Mejía MD MD Orders: Evaluate and Treat   Return MD Appointment: 2022     Pre-treatment Symptoms/Complaints:  Pt. Reports feeling better with therapy. Performing exercises daily. .     Pain: Initial: Pain Intensity 1: 5  Pain Location 1: Shoulder  Pain Orientation 1: Right  Post Session:  4/10   Medications Last Reviewed:  3/23/2022  Updated Objective Findings:  Improved ROM all planes   TREATMENT:   THERAPEUTIC EXERCISE: (30 minutes):  Exercises per grid below to improve mobility, strength, coordination and dynamic movement of shoulder - right to improve functional lifting, carrying, reaching and overhead activites. Required moderate visual, verbal, manual and tactile cues to promote proper body alignment, promote proper body posture and promote proper body mechanics.   Progressed resistance, range, repetitions and complexity of movement as indicated. Date:  3/22/2022 Date:  3/15/22 Date:  3/21/22   Activity/Exercise Parameters Parameters Parameters   Wand external rotation Supine, x 30 Supine x 20 reps  Supine x 25 reps    Wand flexion Supine, x 30 Supine x 20 reps  Supine x 25 reps    Wand abduction Standing x 20 Seated x 20 reps    Backwards shoulder rolls X 20 X 20 reps  X 20 reps    Scapular retraction X 30 X 20 reps  X 20 reps    Wall pulley 4 min X 5 mins flexion & Scaption X 5 mins flexion & scaption   Wand protraction   With wand x 20 reps in supine With wand x 20 reps in supine    internal rotation stretch Strap x 3 Strap 4x30 sec hold  Strap 4x30 sec hold    Shoulder shrugs  X 20 reps  X 20 reps                        Time spent with patient reviewing proper muscle recruitment and technique with exercises. MANUAL THERAPY: (23 minutes): Joint mobilization and Soft tissue mobilization was utilized and necessary because of the patient's restricted joint motion, painful spasm, loss of articular motion and restricted motion of soft tissue   Supine PROM to R shoulder all directions, to tolerance, to improve ROM   Gentle oscillations in neutral to decrease pain and improve relaxation   Grade II-II posterior and inferior R glenohumeral joint mobilizations to decrease pain and improve mobility ( not today)    soft tissue mobilization to gross R shoulder, upper trapezius, and lateral arm to decrease tightness and pain    MODALITIES: (0 minutes):      none today     HEP: As above; handouts given to patient for all exercises. Treatment/Session Summary:    · Response to Treatment:  Decreased pain and improved ROM .   · Communication/Consultation:  HEP and body mechaincs  · Equipment provided today:  None today  · Recommendations/Intent for next treatment session: Next visit will focus on pain control, manual therapy and modalities as needed, progression of ROM and postural exercises, addition of strengthening and functional exercise as tolerated.     Total Treatment Billable Duration:  53 mins  PT Patient Time In/Time Out  Time In: 0800  Time Out: [address], PTA

## 2022-03-29 ENCOUNTER — HOSPITAL ENCOUNTER (OUTPATIENT)
Dept: PHYSICAL THERAPY | Age: 62
Discharge: HOME | End: 2022-03-29
Payer: MEDICARE

## 2022-03-29 PROCEDURE — 97110 THERAPEUTIC EXERCISES: CPT

## 2022-03-29 PROCEDURE — 97140 MANUAL THERAPY 1/> REGIONS: CPT

## 2022-03-29 NOTE — PROGRESS NOTES
Narayan Cain  : 1960  [place]  Secondary: [facility]  [address], [address] [place] [address], [place], [address] [place] [address]  [phone]   [phone]      OUTPATIENT PHYSICAL THERAPY: Daily Treatment Note 3/29/2022    ICD-10: Pain in Right Shoulder (M25.511)  Adhesive capsulitis of right shoulder (M75.01)  Primary Osteoarthritis right shoulder (M19.011)  Precautions: Type II Diabetes, Hypertension- both controlled with medication; neurostimulator for bladder implanted ; history of bilateral shoulder surgeries  Allergies: Adhesive, Ambien [zolpidem], Keflex [cephalexin], and Morphine   TREATMENT PLAN:  Effective Dates: 3/14/2022 TO 2022 (60 days). Frequency/Duration: 2 times a week for 60 Day(s) MEDICAL/REFERRING DIAGNOSIS:  Adhesive capsulitis of right shoulder [M75.01]  Primary osteoarthritis, right shoulder [M19.011]   DATE OF ONSET: Patient reports history of R shoulder pain since a fall in 2019. Underwent R shoulder arthroscopy in 2021. REFERRING PHYSICIAN: Dajuan Rios MD MD Orders: Evaluate and Treat   Return MD Appointment: 2022     Pre-treatment Symptoms/Complaints:  Patient reports tweaking her shoulder on  with pulling part of a fountain and being sore yesterday. States she doesn't feel like she hurt anything, but could tell she did something different. Feeling better today.   Anterior cervical fusion scheduled for 2022    Pain: Initial: Pain Intensity 1: 4  Pain Location 1: Shoulder  Pain Orientation 1: Right  Post Session:  4/10   Medications Last Reviewed:  3/29/2022  Updated Objective Findings:  Improved scapular positioning with exercises and cuing    TREATMENT:   THERAPEUTIC EXERCISE: (25 minutes):  Exercises per grid below to improve mobility, strength, coordination and dynamic movement of shoulder - right to improve functional lifting, carrying, reaching and overhead activites. Required moderate visual, verbal, manual and tactile cues to promote proper body alignment, promote proper body posture and promote proper body mechanics. Progressed resistance, range, repetitions and complexity of movement as indicated. Date:  3/22/2022 Date:  3/29/2022 Date:  3/21/22   Activity/Exercise Parameters Parameters Parameters   Wand external rotation Supine, x 30 --- Supine x 25 reps    Wand flexion Supine, x 30 --- Supine x 25 reps    Wand abduction Standing x 20 Standing, x 10    Backwards shoulder rolls X 20 3 x 10 X 20 reps    Scapular retraction X 30 3 x 10  X 20 reps    Wall pulley 4 min --- X 5 mins flexion & scaption   Wand protraction   --- With wand x 20 reps in supine   Internal rotation stretch Strap x 3 Adduction x 10    Full x 10    Against door frame, x 10 Strap 4x30 sec hold    Shoulder shrugs  --- X 20 reps    Band row --- Yellow, 2 x 10    Band low row --- Yellow, 2 x 10    Bilateral external rotation --- Bolster on wall, yellow, 2 x 10    Wall slides --- X 10            Time spent with patient reviewing proper muscle recruitment and technique with exercises. MANUAL THERAPY: (30 minutes): Joint mobilization and Soft tissue mobilization was utilized and necessary because of the patient's restricted joint motion, painful spasm, loss of articular motion and restricted motion of soft tissue   Supine PROM to R shoulder all directions, to tolerance, to improve ROM   Gentle oscillations in neutral to decrease pain and improve relaxation   Grade II-II posterior and inferior R glenohumeral joint mobilizations to decrease pain and improve mobility    Soft tissue mobilization to gross R shoulder, upper trapezius, and lateral arm to decrease tightness and pain    MODALITIES: (0 minutes):      none today     HEP: As above; handouts given to patient for all exercises.     Treatment/Session Summary:    · Response to Treatment:  Patient tolerated gentle scapular strengthening well with no complaints of  increased neck or shoulder pain. Reviewed HEP and safety at home. .  · Communication/Consultation:  None today  · Equipment provided today:  None today  · Recommendations/Intent for next treatment session: Next visit will focus on pain control, manual therapy and modalities as needed, progression of ROM and postural exercises, addition of strengthening and functional exercise as tolerated.     Total Treatment Billable Duration:  55 minutes  PT Patient Time In/Time Out  Time In: 0802  Time Out: 4089  Craig Padilla, PT

## 2022-03-30 ENCOUNTER — HOSPITAL ENCOUNTER (OUTPATIENT)
Dept: SURGERY | Age: 62
Discharge: HOME | End: 2022-03-30
Attending: NEUROLOGICAL SURGERY
Payer: MEDICARE

## 2022-03-30 VITALS
RESPIRATION RATE: 20 BRPM | TEMPERATURE: 97 F | SYSTOLIC BLOOD PRESSURE: 104 MMHG | WEIGHT: 192.6 LBS | HEART RATE: 78 BPM | HEIGHT: 67 IN | OXYGEN SATURATION: 98 % | BODY MASS INDEX: 30.23 KG/M2 | DIASTOLIC BLOOD PRESSURE: 70 MMHG

## 2022-03-30 LAB
ANION GAP SERPL CALC-SCNC: 5 MMOL/L (ref 7–16)
APPEARANCE UR: CLEAR
BACTERIA SPEC CULT: NORMAL
BASOPHILS # BLD: 0 K/UL (ref 0–0.2)
BASOPHILS NFR BLD: 0 % (ref 0–2)
BILIRUB UR QL: NEGATIVE
BUN SERPL-MCNC: 14 MG/DL (ref 6–23)
CALCIUM SERPL-MCNC: 8.8 MG/DL (ref 8.3–10.4)
CHLORIDE SERPL-SCNC: 104 MMOL/L (ref 98–107)
CO2 SERPL-SCNC: 30 MMOL/L (ref 21–32)
COLOR UR: YELLOW
CREAT SERPL-MCNC: 0.73 MG/DL (ref 0.6–1)
DIFFERENTIAL METHOD BLD: ABNORMAL
EOSINOPHIL # BLD: 0.1 K/UL (ref 0–0.8)
EOSINOPHIL NFR BLD: 3 % (ref 0.5–7.8)
ERYTHROCYTE [DISTWIDTH] IN BLOOD BY AUTOMATED COUNT: 14.4 % (ref 11.9–14.6)
EST. AVERAGE GLUCOSE BLD GHB EST-MCNC: 111 MG/DL
GLUCOSE SERPL-MCNC: 79 MG/DL (ref 65–100)
GLUCOSE UR STRIP.AUTO-MCNC: NEGATIVE MG/DL
HBA1C MFR BLD: 5.5 % (ref 4.2–6.3)
HCT VFR BLD AUTO: 36.4 % (ref 35.8–46.3)
HGB BLD-MCNC: 11.5 G/DL (ref 11.7–15.4)
HGB UR QL STRIP: NEGATIVE
IMM GRANULOCYTES # BLD AUTO: 0 K/UL (ref 0–0.5)
IMM GRANULOCYTES NFR BLD AUTO: 0 % (ref 0–5)
KETONES UR QL STRIP.AUTO: NEGATIVE MG/DL
LEUKOCYTE ESTERASE UR QL STRIP.AUTO: NEGATIVE
LYMPHOCYTES # BLD: 1.6 K/UL (ref 0.5–4.6)
LYMPHOCYTES NFR BLD: 34 % (ref 13–44)
MCH RBC QN AUTO: 27.1 PG (ref 26.1–32.9)
MCHC RBC AUTO-ENTMCNC: 31.6 G/DL (ref 31.4–35)
MCV RBC AUTO: 85.8 FL (ref 79.6–97.8)
MONOCYTES # BLD: 0.4 K/UL (ref 0.1–1.3)
MONOCYTES NFR BLD: 9 % (ref 4–12)
NEUTS SEG # BLD: 2.6 K/UL (ref 1.7–8.2)
NEUTS SEG NFR BLD: 54 % (ref 43–78)
NITRITE UR QL STRIP.AUTO: NEGATIVE
NRBC # BLD: 0 K/UL (ref 0–0.2)
PH UR STRIP: 5 [PH] (ref 5–9)
PLATELET # BLD AUTO: 235 K/UL (ref 150–450)
PMV BLD AUTO: 9.6 FL (ref 9.4–12.3)
POTASSIUM SERPL-SCNC: 3.6 MMOL/L (ref 3.5–5.1)
PROT UR STRIP-MCNC: NEGATIVE MG/DL
RBC # BLD AUTO: 4.24 M/UL (ref 4.05–5.2)
SERVICE CMNT-IMP: NORMAL
SODIUM SERPL-SCNC: 139 MMOL/L (ref 136–145)
SP GR UR REFRACTOMETRY: 1.01 (ref 1–1.02)
UROBILINOGEN UR QL STRIP.AUTO: 0.2 EU/DL (ref 0.2–1)
WBC # BLD AUTO: 4.8 K/UL (ref 4.3–11.1)

## 2022-03-30 PROCEDURE — 81003 URINALYSIS AUTO W/O SCOPE: CPT

## 2022-03-30 PROCEDURE — 80048 BASIC METABOLIC PNL TOTAL CA: CPT

## 2022-03-30 PROCEDURE — 77030027138 HC INCENT SPIROMETER -A

## 2022-03-30 PROCEDURE — 85025 COMPLETE CBC W/AUTO DIFF WBC: CPT

## 2022-03-30 PROCEDURE — 83036 HEMOGLOBIN GLYCOSYLATED A1C: CPT

## 2022-03-30 PROCEDURE — 87641 MR-STAPH DNA AMP PROBE: CPT

## 2022-03-30 RX ORDER — ALBUTEROL SULFATE 90 UG/1
2 AEROSOL, METERED RESPIRATORY (INHALATION)
COMMUNITY
Start: 2021-10-07

## 2022-03-30 RX ORDER — LORAZEPAM 0.5 MG/1
TABLET ORAL
COMMUNITY

## 2022-03-30 RX ORDER — CYANOCOBALAMIN 1000 UG/ML
1000 INJECTION, SOLUTION INTRAMUSCULAR; SUBCUTANEOUS
COMMUNITY
Start: 2022-02-02

## 2022-03-30 RX ORDER — TRAMADOL HYDROCHLORIDE 100 MG/1
100 TABLET, COATED ORAL AS NEEDED
COMMUNITY
End: 2022-04-14

## 2022-03-30 RX ORDER — DICLOFENAC SODIUM 10 MG/G
2 GEL TOPICAL AS NEEDED
COMMUNITY
Start: 2022-02-02 | End: 2022-04-14

## 2022-03-30 RX ORDER — ASPIRIN 81 MG/1
81 TABLET ORAL
COMMUNITY

## 2022-03-30 RX ORDER — DOXYCYCLINE HYCLATE 100 MG
TABLET ORAL
Status: ON HOLD | COMMUNITY
End: 2022-04-12

## 2022-03-30 RX ORDER — DOCUSATE SODIUM 100 MG/1
100 CAPSULE, LIQUID FILLED ORAL
COMMUNITY

## 2022-03-30 RX ORDER — TRIAMCINOLONE ACETONIDE 55 UG/1
2 SPRAY, METERED NASAL AS NEEDED
COMMUNITY

## 2022-03-30 RX ORDER — ERGOCALCIFEROL 1.25 MG/1
50000 CAPSULE ORAL
COMMUNITY
Start: 2022-02-02

## 2022-03-30 RX ORDER — ALBUTEROL SULFATE 0.83 MG/ML
SOLUTION RESPIRATORY (INHALATION)
COMMUNITY
Start: 2021-10-07

## 2022-03-30 RX ORDER — LIDOCAINE 50 MG/G
PATCH TOPICAL
COMMUNITY

## 2022-03-30 RX ORDER — LEVOMEFOLATE CALCIUM 15 MG
15 TABLET ORAL DAILY
COMMUNITY
Start: 2022-02-02

## 2022-03-30 RX ORDER — HYDROCODONE BITARTRATE AND ACETAMINOPHEN 5; 325 MG/1; MG/1
1 TABLET ORAL
COMMUNITY
End: 2022-04-14

## 2022-03-30 RX ORDER — POLYETHYLENE GLYCOL 3350 17 G/17G
POWDER, FOR SOLUTION ORAL AS NEEDED
COMMUNITY

## 2022-03-30 RX ORDER — FLUTICASONE PROPIONATE AND SALMETEROL 250; 50 UG/1; UG/1
POWDER RESPIRATORY (INHALATION)
COMMUNITY
Start: 2022-02-02

## 2022-03-30 RX ORDER — PHENOL/SODIUM PHENOLATE
20 AEROSOL, SPRAY (ML) MUCOUS MEMBRANE DAILY
COMMUNITY

## 2022-03-30 RX ORDER — METFORMIN HYDROCHLORIDE 500 MG/1
500 TABLET, EXTENDED RELEASE ORAL
COMMUNITY
End: 2022-03-30

## 2022-03-30 NOTE — PERIOP NOTES
Recent Results (from the past 12 hour(s))   CBC WITH AUTOMATED DIFF    Collection Time: 03/30/22 10:00 AM   Result Value Ref Range    WBC 4.8 4.3 - 11.1 K/uL    RBC 4.24 4.05 - 5.2 M/uL    HGB 11.5 (L) 11.7 - 15.4 g/dL    HCT 36.4 35.8 - 46.3 %    MCV 85.8 79.6 - 97.8 FL    MCH 27.1 26.1 - 32.9 PG    MCHC 31.6 31.4 - 35.0 g/dL    RDW 14.4 11.9 - 14.6 %    PLATELET 079 176 - 574 K/uL    MPV 9.6 9.4 - 12.3 FL    ABSOLUTE NRBC 0.00 0.0 - 0.2 K/uL    DF AUTOMATED      NEUTROPHILS 54 43 - 78 %    LYMPHOCYTES 34 13 - 44 %    MONOCYTES 9 4.0 - 12.0 %    EOSINOPHILS 3 0.5 - 7.8 %    BASOPHILS 0 0.0 - 2.0 %    IMMATURE GRANULOCYTES 0 0.0 - 5.0 %    ABS. NEUTROPHILS 2.6 1.7 - 8.2 K/UL    ABS. LYMPHOCYTES 1.6 0.5 - 4.6 K/UL    ABS. MONOCYTES 0.4 0.1 - 1.3 K/UL    ABS. EOSINOPHILS 0.1 0.0 - 0.8 K/UL    ABS. BASOPHILS 0.0 0.0 - 0.2 K/UL    ABS. IMM.  GRANS. 0.0 0.0 - 0.5 K/UL   METABOLIC PANEL, BASIC    Collection Time: 03/30/22 10:00 AM   Result Value Ref Range    Sodium 139 136 - 145 mmol/L    Potassium 3.6 3.5 - 5.1 mmol/L    Chloride 104 98 - 107 mmol/L    CO2 30 21 - 32 mmol/L    Anion gap 5 (L) 7 - 16 mmol/L    Glucose 79 65 - 100 mg/dL    BUN 14 6 - 23 MG/DL    Creatinine 0.73 0.6 - 1.0 MG/DL    GFR est AA >60 >60 ml/min/1.73m2    GFR est non-AA >60 >60 ml/min/1.73m2    Calcium 8.8 8.3 - 10.4 MG/DL   URINALYSIS W/ RFLX MICROSCOPIC    Collection Time: 03/30/22 10:00 AM   Result Value Ref Range    Color YELLOW      Appearance CLEAR      Specific gravity 1.015 1.001 - 1.023      pH (UA) 5.0 5.0 - 9.0      Protein Negative NEG mg/dL    Glucose Negative mg/dL    Ketone Negative NEG mg/dL    Bilirubin Negative NEG      Blood Negative NEG      Urobilinogen 0.2 0.2 - 1.0 EU/dL    Nitrites Negative NEG      Leukocyte Esterase Negative NEG     HEMOGLOBIN A1C WITH EAG    Collection Time: 03/30/22 10:00 AM   Result Value Ref Range    Hemoglobin A1c 5.5 4.20 - 6.30 %    Est. average glucose 111 mg/dL   MSSA/MRSA SC BY PCR, NASAL SWAB Collection Time: 03/30/22 10:00 AM    Specimen: Nasal swab   Result Value Ref Range    Special Requests: NO SPECIAL REQUESTS      Culture result:        SA target not detected. A MRSA NEGATIVE, SA NEGATIVE test result does not preclude MRSA or SA nasal colonization. Labs reviewed. Routing results to surgeon.

## 2022-03-30 NOTE — PERIOP NOTES
Patient verified name, , and surgery as listed in [facility]. Patient provided medical/health information and PTA medications to the best of their ability. TYPE  CASE: 1B per spine protocol listing one level ACDF as 1B  Orders per surgeon: not received as of 10:13 a.m. 3/30/2022  Labs per surgeon: MSSA/MRSA per protocol. Results: pending  Labs per anesthesia protocol: CBC with diff, BMP, UA, HgbA1C. Results: pending  EKG:  Not indicated for 1B surgery per anesthesia protocol     Patient states a prior history of afib during visit to [facility] in [place]: [phone]. Has not seen cardiologist since 2018. No c/o CP and no c/o SOB. Called to [facility] at number above and LVM with Medical Records department asking for 2018 EKG and office notes. Per Ashanti  at office, they are not part of a local medical system that uses Epic documentation. Nasal Swab collected per MD order. Patient provided with and instructed on education handouts including Guide to Surgery, blood transfusions, pain management, and hand hygiene for the family and community, and [facility] brochure. Road to Recovery Spine surgery patient guide given. Instructed on incentive spirometry with return demonstration. Hibiclens and instructions given per hospital policy. Original medication prescription bottles visualized during patient appointment. Patient teach back successful and patient demonstrates knowledge of instruction.

## 2022-03-30 NOTE — PERIOP NOTES
PLEASE CONTINUE TAKING ALL PRESCRIPTION MEDICATIONS UP TO THE DAY OF SURGERY UNLESS OTHERWISE DIRECTED BELOW. DISCONTINUE all vitamins and supplements 7 days prior to surgery. DISCONTINUE Non-Steriodal Anti-Inflammatory (NSAIDS) such as Advil and Aleve 5 days prior to surgery. Home Medications to take  the day of surgery   Bring inhalers and use daily inhalers day of surgery  Pristiq/desvelafaxine  Metoprolol/Lopressor  **omeprazole/Prilosec  May take pain medication if needed  May take lorazepam if needed  May take maxalt if needed  May take tramadol if needed   May use nasacort if needed             Home Medications   to Hold   DISCONTINUE all vitamins and supplements 7 days prior to surgery. DISCONTINUE Non-Steriodal Anti-Inflammatory (NSAIDS) such as Advil and Aleve 5 days prior to surgery. Janumet  Lisinopril  l-methylfolate  Potassium       Comments                  Please do not bring home medications with you on the day of surgery unless otherwise directed by your nurse. If you are instructed to bring home medications, please give them to your nurse as they will be administered by the nursing staff. If you have any questions, please call [address] [place] [address] [phone] or [facility] [phone]. A copy of this note was provided to the patient for reference. How to Use Your Incentive Spirometer       About Your Incentive Spirometer  An incentive spirometer is a device that will expand your lungs by helping you to breathe more deeply and fully. The parts of your incentive spirometer are labeled in Figure 1. Using your incentive spirometer  When youre using your incentive spirometer, make sure to breathe through your mouth. If you breathe through your nose, the incentive spirometer wont work properly. You can hold your nose if you have trouble. DO NOT BLOW INTO THE DEVICE. If you feel dizzy at any time, stop and rest. Try again at a later time.   1. Sit upright in a chair or in bed. Hold the incentive spirometer at eye level. 2. Put the mouthpiece in your mouth and close your lips tightly around it. Slowly breathe out (exhale) completely. 3. Breathe in (inhale) slowly through your mouth as deeply as you can. As you take the breath, you will see the piston rise inside the large column. While the piston rises, the indicator on the right should move upwards. It should stay in between the 2 arrows (see Figure 1). 4. Try to get the piston as high as you can, while keeping the indicator between the arrows. If the indicator doesnt stay between the arrows, youre breathing either too fast or too slow. 5. When you get it as high as you can, hold your breath for 10 seconds, or as long as possible. While youre holding your breath, the piston will slowly fall to the base of the spirometer. 6. Once the piston reaches the bottom of the spirometer, breathe out slowly through your mouth. Rest for a few seconds. 7. Repeat 10 times. Try to get the piston to the same level with each breath. 8. After each set of 10 breaths, try to cough as coughing will help loosen or clear any mucus in your lungs. 9. Put the marker at the level the piston reached on your incentive spirometer. This will be your goal next time. Repeat these steps every hour that youre awake.   Cover the mouthpiece of the incentive spirometer when you arent using it

## 2022-03-31 ENCOUNTER — HOSPITAL ENCOUNTER (OUTPATIENT)
Dept: PHYSICAL THERAPY | Age: 62
Discharge: HOME | End: 2022-03-31
Payer: MEDICARE

## 2022-03-31 PROCEDURE — 97140 MANUAL THERAPY 1/> REGIONS: CPT

## 2022-03-31 PROCEDURE — 97110 THERAPEUTIC EXERCISES: CPT

## 2022-03-31 NOTE — PERIOP NOTES
The following records have been requested from [facility]:       [facility]    Please send EKG, ECHO, Stress, and last office visit via fax to [phone]. Thank you!

## 2022-03-31 NOTE — PROGRESS NOTES
Kristan Cain  : 1960  [place]  Secondary: [facility]  [address], [address] [place] [address], [facility], [address] [place] [address]  [phone]   [phone]      OUTPATIENT PHYSICAL THERAPY: Daily Treatment Note 3/31/2022    ICD-10: Pain in Right Shoulder (M25.511)  Adhesive capsulitis of right shoulder (M75.01)  Primary Osteoarthritis right shoulder (M19.011)  Precautions: Type II Diabetes, Hypertension- both controlled with medication; neurostimulator for bladder implanted ; history of bilateral shoulder surgeries  Allergies: Adhesive, Ambien [zolpidem], Keflex [cephalexin], and Morphine   TREATMENT PLAN:  Effective Dates: 3/14/2022 TO 2022 (60 days). Frequency/Duration: 2 times a week for 60 Day(s) MEDICAL/REFERRING DIAGNOSIS:  Adhesive capsulitis of right shoulder [M75.01]  Primary osteoarthritis, right shoulder [M19.011]   DATE OF ONSET: Patient reports history of R shoulder pain since a fall in 2019. Underwent R shoulder arthroscopy in 2021. REFERRING PHYSICIAN: Marina Aparicio MD MD Orders: Evaluate and Treat   Return MD Appointment: 2022     Pre-treatment Symptoms/Complaints:  Pt. Reported having surgery next  on cervical spine  Anterior cervical fusion scheduled for 2022    Pain: Initial: Pain Intensity 1: 4  Pain Location 1: Neck,Shoulder  Pain Orientation 1: Left,Right  Post Session:  3/10   Medications Last Reviewed:  3/31/2022  Updated Objective Findings:  Pt, presented good cervical range of motion today    TREATMENT:   THERAPEUTIC EXERCISE: (40 minutes):  Exercises per grid below to improve mobility, strength, coordination and dynamic movement of shoulder - right to improve functional lifting, carrying, reaching and overhead activites.   Required moderate visual, verbal, manual and tactile cues to promote proper body alignment, promote proper body posture and promote proper body mechanics. Progressed resistance, range, repetitions and complexity of movement as indicated. Date:  3/22/2022 Date:  3/29/2022 Date:  3/31/22   Activity/Exercise Parameters Parameters Parameters   UBE    Level 4 x 4 mins fwd & 4 mins bkwd   TRX   Flexion & extension x 20 reps    Wand external rotation Supine, x 30 --- Supine x 25   reps    Wand flexion Supine, x 30 --- Supine x 25 reps    Wand abduction Standing x 20 Standing, x 10 Standing x 10 reps    Backwards shoulder rolls X 20 3 x 10 X 20 reps    Scapular retraction X 30 3 x 10  X 20 reps    Wall pulley 4 min --- X 5 mins flexion & scaption   Wand protraction   --- With wand x 20 reps in supine   Internal rotation stretch Strap x 3 Adduction x 10    Full x 10    Against door frame, x 10 Strap 4x30 sec hold    Shoulder shrugs  --- X 20 reps    Band row --- Yellow, 2 x 10 Yellow band 2x10 reps 5 sec hold each   Band low row --- Yellow, 2 x 10 Yellow band 2x10 reps 5 sec hold each   Bilateral external rotation --- Bolster on wall, yellow, 2 x 10 Bolster on wall yellow 2x10    Wall slides --- X 10 X 10 reps    Bilateral shoulder internal and external rotation    Yellow band 2x10 reps each yellow band with towel roll      Time spent with patient reviewing proper muscle recruitment and technique with exercises. MANUAL THERAPY: (15 minutes): Joint mobilization and Soft tissue mobilization was utilized and necessary because of the patient's restricted joint motion, painful spasm, loss of articular motion and restricted motion of soft tissue   Grade II-II posterior and inferior R glenohumeral joint mobilizations to decrease pain and improve mobility    Soft tissue mobilization to gross R shoulder, upper trapezius, and lateral arm to decrease tightness and pain    MODALITIES: (0 minutes):      none today     HEP: As above; handouts given to patient for all exercises.     Treatment/Session Summary:    · Response to Treatment:  Pt. was compliant with all exercise and reported decrease tightness and pain at the end of session. · Communication/Consultation:  None today  · Equipment provided today:  None today  · Recommendations/Intent for next treatment session: Next visit will focus on pain control, manual therapy and modalities as needed, progression of ROM and postural exercises, addition of strengthening and functional exercise as tolerated.     Total Treatment Billable Duration:  55 minutes  PT Patient Time In/Time Out  Time In: 0800  Time Out: 0900  Sara Hernandez, JOLENE

## 2022-04-04 ENCOUNTER — HOSPITAL ENCOUNTER (OUTPATIENT)
Dept: PHYSICAL THERAPY | Age: 62
Discharge: HOME | End: 2022-04-04
Payer: MEDICARE

## 2022-04-04 PROCEDURE — 97110 THERAPEUTIC EXERCISES: CPT

## 2022-04-04 PROCEDURE — 97140 MANUAL THERAPY 1/> REGIONS: CPT

## 2022-04-04 NOTE — THERAPY DISCHARGE
Sangita Cain  : 1960  [place]  Secondary: [facility]  [address], [address] [place] [address], [place], [address] [place] [address]  [phone]   [phone]       OUTPATIENT PHYSICAL THERAPY:Progress Report and Discharge 2022    ICD-10: Pain in Right Shoulder (M25.511)  Adhesive capsulitis of right shoulder (M75.01)  Primary Osteoarthritis right shoulder (M19.011)  Precautions: Type II Diabetes, Hypertension- both controlled with medication; neurostimulator for bladder implanted ; history of bilateral shoulder surgeries  Allergies: Crab, Adhesive, Ambien [zolpidem], Keflex [cephalexin], and Morphine   TREATMENT PLAN:  Effective Dates: 3/14/2022 TO 2022 (60 days). Frequency/Duration: 2 times a week for 60 Day(s) MEDICAL/REFERRING DIAGNOSIS:  Adhesive capsulitis of right shoulder [M75.01]  Primary osteoarthritis, right shoulder [M19.011]   DATE OF ONSET: Patient reports history of R shoulder pain since a fall in 2019. Underwent R shoulder arthroscopy in 2021. REFERRING PHYSICIAN: Judy Watson MD MD Orders: Evaluate and Treat   Return MD Appointment: 2022     INITIAL ASSESSMENT:  Ms. Jamey Cain is a 63 y.o. female presenting to physical therapy with complaints of R shoulder pain and stiffness. She also reports some neck and L UE symptoms as well. History of L shoulder surgery x 2, and R shoulder arthroscopy in 2021. She reports no attending physical therapy after her R shoulder surgery due to circumstances with family and traveling. Patient and her  are now retired and back in [facility] for [facility] and she is wishing to improve her R shoulder pain, ROM, strength, and overall functional use. Patient with significant history of orthopedic pains, bladder neurostimulator, and neck/ back problems. Patient also reports multiple falls and ambulates with a straight cane in her R UE for stability.  She is eager to reduce her pain and improve her mobility in order to return to her prior level of independence and activities. Patient presents with increased pain, decreased strength, decreased ROM, decreased flexibility, impaired gait, impaired posture, impaired overhead reach, impaired transfer ability, decreased activity tolerance, and overall impaired functional mobility. Patient is a good candidate for skilled physical therapy interventions to include manual therapy, therapeutic exercise, transfer training, postural re-education, body mechanics training, and pain modalities as needed. PROGRESS NOTE/ DISCHARGE 4/4/2022: Patient has been seen for 7 sessions of physical therapy from 3/14/2022 to 4/4/2022. She reports her shoulder feeling better since starting therapy with improved ROM and use of her R UE. She needs to continue working on strength once she is able. Patient is scheduled for an anterior cervical fusion on 4/7/2022 and is discharging from current plan of care at this time. PROBLEM LIST (Impacting functional limitations):  1. Decreased Strength  2. Decreased ADL/Functional Activities  3. Decreased Transfer Abilities  4. Decreased Ambulation Ability/Technique  5. Increased Pain  6. Decreased Activity Tolerance  7. Decreased Pacing Skills  8. Decreased Work Simplification/Energy Conservation Techniques  9. Decreased Flexibility/Joint Mobility  10. Edema/Girth INTERVENTIONS PLANNED: (Treatment may consist of any combination of the following)  1. Cold  2. Family Education  3. Heat  4. Home Exercise Program (HEP)  5. Manual Therapy  6. Range of Motion (ROM)  7. Therapeutic Activites  8. Therapeutic Exercise/Strengthening  9. Transfer Training  10. Trigger Point Dry Needling     GOALS: (Goals have been discussed and agreed upon with patient.)  Short-Term Functional Goals: Time Frame: 3/14/2022 to 4/13/2022  1.  Patient demonstrates independence with home exercise program without verbal cueing provided by therapist. -GOAL MET  2. Patient will report no more than 3/10 R shoulder pain at rest in order to demonstrate improved self pain control and tolerance. -GOAL MET  3. Patient will be educated in and demonstrate improved upright posture including decreased forward head and shoulders to improve clearance for overhead activities. -GOAL MET  4. Patient will improve R shoulder elevation to at least 150 degrees in order to improve overhead motion and reach. -GOAL MET  Discharge Goals: Time Frame: 3/14/2022 to 5/13/2022  1. Patient will improve gross R shoulder strength to at least 4+/5 in order to improve safety with lifting, reaching, carrying, and overhead activities. -NOT MET  2. Patient will improve R functional internal rotation to L1 in order to improve ability to improve independence with donning/ doffing bra and washing back. -GOAL MET  3. Patient will improve R functional external rotation to C7 in order to improve ability to wash her hair independently. -GOAL MET  4. Patient will be able to sleep through the night without waking due to R shoulder pain in order to return to prior sleeping pattern for health and wellness. -NOT MET  5. Patient will be able to perform housework, including reaching into cabinets, with minimal to no complaints in order to return to full functional use of R UE. -GOAL MET  6. Patient will improve Disability of the Arm, Shoulder, and Hand score to 25/55 from 36/55. -NOT MET    Outcome Measure: Tool Used: Disabilities of the Arm, Shoulder and Hand (DASH) Questionnaire - Quick Version  Score:  Initial: 36/55  Most Recent: 31/55 (Date: 4/4/2022 )   Interpretation of Score: The DASH is designed to measure the activities of daily living in person's with upper extremity dysfunction or pain. Each section is scored on a 1-5 scale, 5 representing the greatest disability. The scores of each section are added together for a total score of 55.       Ambulatory/Rehab Services H2 Model Falls Risk Assessment   Risk Factors:       (5)  History of Recent Falls [w/in 3 months] Ability to Rise from Chair:       (1)  Pushes up, successful in one attempt   Falls Prevention Plan:       No modifications necessary   Total: (5 or greater = High Risk): 6   ©2010 [facility] of Yovani Farley. Cynthia States Patent #[id number]. Federal Law prohibits the replication, distribution or use without written permission from [facility] Franklyn Amanda FINDINGS:  Palpation:          Moderate tenderness to palpation to gross R glenohumeral joint, AC joint, and scapular region. Moderate to significant (from significant) tightness bilateral upper trapezius with trigger points and tenderness noted. ROM:          NT = not tested  AROM/ PROM Left (degrees) Right (degrees)   Shoulder Flexion 155 150 (from 135)   Shoulder Abduction 120 110 (from 90)   Shoulder Internal Rotation  70 70 (from 60)   Functional Internal Rotation T10 L1 (from L5)   Shoulder External Rotation 40 70 (from 45)   Functional External Rotation C7 C7 (from back of head)     Strength: Motion Tested Left   (*/5) Right  (*/5)   Shoulder Flexion 5 4 (from 3+)   Scaption 5 4   Shoulder Abduction 5 4+   Shoulder Internal Rotation  5 4+ (from 4)   Shoulder External Rotation 5 4 (from 4-)   Elbow Flexion 5 5   Elbow Extension 5 5     Passive Accessory Motion:        Significant tightness through glenohumeral joint mobilizations all directions, worst posterior and inferior. Functional Mobility:         Patient ambulates with straight cane in R UE for stability, but reports some R UE pain using cane. Difficulty dressing- donning/ doffing bra, bathing- washing back and hair, pain with driving, waking 2 times a night due to R shoulder pain, and limited with housework/ reaching overhead with R UE. Balance:          Sitting balance intact. Standing balance limited. HIGH FALL RISK.       Reason for Services/Other Comments:  · Patient discharged at this time. Rehabilitation Potential For Stated Goals: Good  Regarding Arvind Floyd's therapy, I certify that the treatment plan above will be carried out by a therapist or under their direction. Thank you for this referral,  Kasey Rosa PT     Referring Physician Signature: Kaley Edwards MD No Signature is Required for this note.

## 2022-04-04 NOTE — PROGRESS NOTES
Aylin Cain  : 1960  [place]  Secondary: [facility]  [address] [place], [address] [place] [address], [place]qu[place] damian, [address] [place] [address]  [phone]   [phone]      OUTPATIENT PHYSICAL THERAPY: Daily Treatment Note 2022    ICD-10: Pain in Right Shoulder (M25.511)  Adhesive capsulitis of right shoulder (M75.01)  Primary Osteoarthritis right shoulder (M19.011)  Precautions: Type II Diabetes, Hypertension- both controlled with medication; neurostimulator for bladder implanted ; history of bilateral shoulder surgeries  Allergies: Adhesive, Ambien [zolpidem], Keflex [cephalexin], and Morphine   TREATMENT PLAN:  Effective Dates: 3/14/2022 TO 2022 (60 days). Frequency/Duration: 2 times a week for 60 Day(s) MEDICAL/REFERRING DIAGNOSIS:  Adhesive capsulitis of right shoulder [M75.01]  Primary osteoarthritis, right shoulder [M19.011]   DATE OF ONSET: Patient reports history of R shoulder pain since a fall in 2019. Underwent R shoulder arthroscopy in 2021. REFERRING PHYSICIAN: Tio Franklin MD MD Orders: Evaluate and Treat   Return MD Appointment: 2022     Pre-treatment Symptoms/Complaints:  Patient reports feeling better with her R shoulder motion and use of her R UE. Having neck surgery on Thursday. Pain: Initial: Pain Intensity 1: 3  Pain Location 1: Neck,Shoulder  Pain Orientation 1: Right  Post Session:  4/10   Medications Last Reviewed:  2022  Updated Objective Findings:  See Dishcarge Note from today    TREATMENT:   THERAPEUTIC EXERCISE: (23 minutes):  Exercises per grid below to improve mobility, strength, coordination and dynamic movement of shoulder - right to improve functional lifting, carrying, reaching and overhead activites. Required moderate visual, verbal, manual and tactile cues to promote proper body alignment, promote proper body posture and promote proper body mechanics. Progressed resistance, range, repetitions and complexity of movement as indicated. Date:  4/4/2022 Date:  3/29/2022 Date:  3/31/22   Activity/Exercise Parameters Parameters Parameters   UBE  Level 1, 3 minutes forward, 3 minutes backwards  Level 4 x 4 mins fwd & 4 mins bkwd   TRX Flexion/ extension x 10  Flexion & extension x 20 reps    Wand external rotation --- --- Supine x 25   reps    Wand flexion --- --- Supine x 25 reps    Wand abduction --- Standing, x 10 Standing x 10 reps    Backwards shoulder rolls 2 x 20 3 x 10 X 20 reps    Scapular retraction 2 x 20 3 x 10  X 20 reps    Wall pulley --- --- X 5 mins flexion & scaption   Wand protraction  --- --- With wand x 20 reps in supine   Internal rotation stretch Full, x 10 Adduction x 10    Full x 10    Against door frame, x 10 Strap 4x30 sec hold    Shoulder shrugs X 20 --- X 20 reps    Band row --- Yellow, 2 x 10 Yellow band 2x10 reps 5 sec hold each   Band low row --- Yellow, 2 x 10 Yellow band 2x10 reps 5 sec hold each   Bilateral external rotation --- Bolster on wall, yellow, 2 x 10 Bolster on wall yellow 2x10    Wall slides --- X 10 X 10 reps    Bilateral shoulder internal and external rotation  ---  Yellow band 2x10 reps each yellow band with towel roll      Time spent with patient reviewing proper muscle recruitment and technique with exercises.      MANUAL THERAPY: (30 minutes): Joint mobilization and Soft tissue mobilization was utilized and necessary because of the patient's restricted joint motion, painful spasm, loss of articular motion and restricted motion of soft tissue   Grade II-II posterior and inferior R glenohumeral joint mobilizations to decrease pain and improve mobility    Soft tissue mobilization to gross R shoulder, upper trapezius, and lateral arm to decrease tightness and pain   PROM all directions to end range tolerance for ROM measurements and stretching    MODALITIES: (0 minutes):      none today     HEP: As above; handouts given to patient for all exercises. Treatment/Session Summary:    · Response to Treatment:  patient with mild soreness after stretching, but no complaints of increased pain. Reports confidence with HEP and is safe for discharge at this time. .  · Communication/Consultation:  Reviewed HEP  · Equipment provided today:  None today   · Recommendations/Intent for next treatment session: Patient discharged    Total Treatment Billable Duration:  53 minutes  PT Patient Time In/Time Out  Time In: 0804  Time Out: [place], PT

## 2022-04-05 NOTE — PERIOP NOTES
Records received from [facility], scanned into EHR if needed for reference.

## 2022-04-06 NOTE — PERIOP NOTES
[facility] records received - EKG 07/11/18, Echo & Stress Test 07/23/18 and last office visit 08/07/18, within anesthesia guidelines, scanned into EHR if needed for reference.

## 2022-04-07 ENCOUNTER — APPOINTMENT (OUTPATIENT)
Dept: PHYSICAL THERAPY | Age: 62
End: 2022-04-07
Payer: MEDICARE

## 2022-04-12 ENCOUNTER — ANESTHESIA (OUTPATIENT)
Dept: SURGERY | Age: 62
End: 2022-04-12
Payer: MEDICARE

## 2022-04-12 ENCOUNTER — APPOINTMENT (OUTPATIENT)
Dept: PHYSICAL THERAPY | Age: 62
End: 2022-04-12
Payer: MEDICARE

## 2022-04-12 ENCOUNTER — APPOINTMENT (OUTPATIENT)
Dept: GENERAL RADIOLOGY | Age: 62
End: 2022-04-12
Attending: NEUROLOGICAL SURGERY
Payer: MEDICARE

## 2022-04-12 ENCOUNTER — ANESTHESIA EVENT (OUTPATIENT)
Dept: SURGERY | Age: 62
End: 2022-04-12
Payer: MEDICARE

## 2022-04-12 ENCOUNTER — HOSPITAL ENCOUNTER (OUTPATIENT)
Age: 62
Setting detail: OBSERVATION
Discharge: HOME | End: 2022-04-14
Attending: NEUROLOGICAL SURGERY | Admitting: NEUROLOGICAL SURGERY
Payer: MEDICARE

## 2022-04-12 DIAGNOSIS — N32.89 BLADDER SPASM: ICD-10-CM

## 2022-04-12 DIAGNOSIS — M54.12 CERVICAL RADICULOPATHY: ICD-10-CM

## 2022-04-12 DIAGNOSIS — M50.30 DDD (DEGENERATIVE DISC DISEASE), CERVICAL: ICD-10-CM

## 2022-04-12 DIAGNOSIS — M48.02 NEUROFORAMINAL STENOSIS OF CERVICAL SPINE: ICD-10-CM

## 2022-04-12 DIAGNOSIS — M48.02 CERVICAL SPINAL STENOSIS: Primary | ICD-10-CM

## 2022-04-12 LAB
GLUCOSE BLD STRIP.AUTO-MCNC: 108 MG/DL (ref 65–100)
GLUCOSE BLD STRIP.AUTO-MCNC: 180 MG/DL (ref 65–100)
GLUCOSE BLD STRIP.AUTO-MCNC: 95 MG/DL (ref 65–100)
SERVICE CMNT-IMP: ABNORMAL
SERVICE CMNT-IMP: ABNORMAL
SERVICE CMNT-IMP: NORMAL

## 2022-04-12 PROCEDURE — G0378 HOSPITAL OBSERVATION PER HR: HCPCS

## 2022-04-12 PROCEDURE — 77030010507 HC ADH SKN DERMBND J&J -B: Performed by: NEUROLOGICAL SURGERY

## 2022-04-12 PROCEDURE — 77030014007 HC SPNG HEMSTAT J&J -B: Performed by: NEUROLOGICAL SURGERY

## 2022-04-12 PROCEDURE — 74011250636 HC RX REV CODE- 250/636: Performed by: NEUROLOGICAL SURGERY

## 2022-04-12 PROCEDURE — 77030041390 HC GRFT BN PROT GRWTH FCTR BSYC -E: Performed by: NEUROLOGICAL SURGERY

## 2022-04-12 PROCEDURE — 94760 N-INVAS EAR/PLS OXIMETRY 1: CPT

## 2022-04-12 PROCEDURE — 77030040830 HC CATH URETH FOL MDII -A: Performed by: NEUROLOGICAL SURGERY

## 2022-04-12 PROCEDURE — 74011000250 HC RX REV CODE- 250: Performed by: NEUROLOGICAL SURGERY

## 2022-04-12 PROCEDURE — 74011250636 HC RX REV CODE- 250/636: Performed by: NURSE ANESTHETIST, CERTIFIED REGISTERED

## 2022-04-12 PROCEDURE — 74011000272 HC RX REV CODE- 272: Performed by: NEUROLOGICAL SURGERY

## 2022-04-12 PROCEDURE — C1713 ANCHOR/SCREW BN/BN,TIS/BN: HCPCS | Performed by: NEUROLOGICAL SURGERY

## 2022-04-12 PROCEDURE — 77030003029 HC SUT VCRL J&J -B: Performed by: NEUROLOGICAL SURGERY

## 2022-04-12 PROCEDURE — 77030019908 HC STETH ESOPH SIMS -A: Performed by: ANESTHESIOLOGY

## 2022-04-12 PROCEDURE — 76210000063 HC OR PH I REC FIRST 0.5 HR: Performed by: NEUROLOGICAL SURGERY

## 2022-04-12 PROCEDURE — 82962 GLUCOSE BLOOD TEST: CPT

## 2022-04-12 PROCEDURE — 74011000250 HC RX REV CODE- 250: Performed by: NURSE ANESTHETIST, CERTIFIED REGISTERED

## 2022-04-12 PROCEDURE — 77030002933 HC SUT MCRYL J&J -A: Performed by: NEUROLOGICAL SURGERY

## 2022-04-12 PROCEDURE — 74011250636 HC RX REV CODE- 250/636: Performed by: ANESTHESIOLOGY

## 2022-04-12 PROCEDURE — 74011250637 HC RX REV CODE- 250/637: Performed by: NEUROLOGICAL SURGERY

## 2022-04-12 PROCEDURE — 2709999900 HC NON-CHARGEABLE SUPPLY: Performed by: NEUROLOGICAL SURGERY

## 2022-04-12 PROCEDURE — 77030042508 HC BIT DRL -B: Performed by: NEUROLOGICAL SURGERY

## 2022-04-12 PROCEDURE — 77030034475 HC MISC IMPL SPN: Performed by: NEUROLOGICAL SURGERY

## 2022-04-12 PROCEDURE — 77030037088 HC TUBE ENDOTRACH ORAL NSL COVD-A: Performed by: ANESTHESIOLOGY

## 2022-04-12 PROCEDURE — 77030025420 HC BUR NEUR TPS STRY -C: Performed by: NEUROLOGICAL SURGERY

## 2022-04-12 PROCEDURE — 77010033678 HC OXYGEN DAILY

## 2022-04-12 PROCEDURE — 74011250637 HC RX REV CODE- 250/637: Performed by: ANESTHESIOLOGY

## 2022-04-12 PROCEDURE — 76060000036 HC ANESTHESIA 2.5 TO 3 HR: Performed by: NEUROLOGICAL SURGERY

## 2022-04-12 PROCEDURE — 77030003666 HC NDL SPINAL BD -A: Performed by: NEUROLOGICAL SURGERY

## 2022-04-12 PROCEDURE — 77030011267 HC ELECTRD BLD COVD -A: Performed by: NEUROLOGICAL SURGERY

## 2022-04-12 PROCEDURE — 72040 X-RAY EXAM NECK SPINE 2-3 VW: CPT

## 2022-04-12 PROCEDURE — 77030038552 HC DRN WND MDII -A: Performed by: NEUROLOGICAL SURGERY

## 2022-04-12 PROCEDURE — 76010000172 HC OR TIME 2.5 TO 3 HR INTENSV-TIER 1: Performed by: NEUROLOGICAL SURGERY

## 2022-04-12 PROCEDURE — 77030028271 HC SRGFL HEMSTAT MTRX KT J&J -C: Performed by: NEUROLOGICAL SURGERY

## 2022-04-12 PROCEDURE — 77030040922 HC BLNKT HYPOTHRM STRY -A: Performed by: ANESTHESIOLOGY

## 2022-04-12 PROCEDURE — C1889 IMPLANT/INSERT DEVICE, NOC: HCPCS | Performed by: NEUROLOGICAL SURGERY

## 2022-04-12 DEVICE — SCREW SPNL L12MM DIA4MM SELF STARTING ANTR CERV PLT SYS: Type: IMPLANTABLE DEVICE | Site: SPINE CERVICAL | Status: FUNCTIONAL

## 2022-04-12 DEVICE — ANTERIOR CERVICAL CAGE
Type: IMPLANTABLE DEVICE | Site: SPINE CERVICAL | Status: FUNCTIONAL
Brand: TRITANIUM C

## 2022-04-12 DEVICE — IMPLANTABLE DEVICE: Type: IMPLANTABLE DEVICE | Site: SPINE CERVICAL | Status: FUNCTIONAL

## 2022-04-12 DEVICE — BIO DBM PUTTY
Type: IMPLANTABLE DEVICE | Site: SPINE CERVICAL | Status: FUNCTIONAL
Brand: BIO DBM

## 2022-04-12 RX ORDER — SODIUM CHLORIDE 0.9 % (FLUSH) 0.9 %
5-40 SYRINGE (ML) INJECTION AS NEEDED
Status: DISCONTINUED | OUTPATIENT
Start: 2022-04-12 | End: 2022-04-12 | Stop reason: HOSPADM

## 2022-04-12 RX ORDER — CEFAZOLIN SODIUM/WATER 2 G/20 ML
2 SYRINGE (ML) INTRAVENOUS ONCE
Status: COMPLETED | OUTPATIENT
Start: 2022-04-12 | End: 2022-04-12

## 2022-04-12 RX ORDER — TRAZODONE HYDROCHLORIDE 50 MG/1
100 TABLET ORAL
Status: DISCONTINUED | OUTPATIENT
Start: 2022-04-12 | End: 2022-04-14 | Stop reason: HOSPADM

## 2022-04-12 RX ORDER — FENTANYL CITRATE 50 UG/ML
INJECTION, SOLUTION INTRAMUSCULAR; INTRAVENOUS AS NEEDED
Status: DISCONTINUED | OUTPATIENT
Start: 2022-04-12 | End: 2022-04-12 | Stop reason: HOSPADM

## 2022-04-12 RX ORDER — MIDAZOLAM HYDROCHLORIDE 1 MG/ML
2 INJECTION, SOLUTION INTRAMUSCULAR; INTRAVENOUS ONCE
Status: DISCONTINUED | OUTPATIENT
Start: 2022-04-12 | End: 2022-04-12 | Stop reason: HOSPADM

## 2022-04-12 RX ORDER — SODIUM CHLORIDE 0.9 % (FLUSH) 0.9 %
5-40 SYRINGE (ML) INJECTION EVERY 8 HOURS
Status: DISCONTINUED | OUTPATIENT
Start: 2022-04-12 | End: 2022-04-14 | Stop reason: HOSPADM

## 2022-04-12 RX ORDER — ROSUVASTATIN CALCIUM 10 MG/1
20 TABLET, COATED ORAL
Status: DISCONTINUED | OUTPATIENT
Start: 2022-04-12 | End: 2022-04-14 | Stop reason: HOSPADM

## 2022-04-12 RX ORDER — ONDANSETRON 2 MG/ML
4 INJECTION INTRAMUSCULAR; INTRAVENOUS
Status: DISCONTINUED | OUTPATIENT
Start: 2022-04-12 | End: 2022-04-14 | Stop reason: HOSPADM

## 2022-04-12 RX ORDER — HYDROCODONE BITARTRATE AND ACETAMINOPHEN 10; 325 MG/1; MG/1
1 TABLET ORAL
Status: DISCONTINUED | OUTPATIENT
Start: 2022-04-12 | End: 2022-04-14 | Stop reason: HOSPADM

## 2022-04-12 RX ORDER — FENTANYL CITRATE 50 UG/ML
25 INJECTION, SOLUTION INTRAMUSCULAR; INTRAVENOUS ONCE
Status: DISCONTINUED | OUTPATIENT
Start: 2022-04-12 | End: 2022-04-12 | Stop reason: HOSPADM

## 2022-04-12 RX ORDER — ONDANSETRON 2 MG/ML
INJECTION INTRAMUSCULAR; INTRAVENOUS AS NEEDED
Status: DISCONTINUED | OUTPATIENT
Start: 2022-04-12 | End: 2022-04-12 | Stop reason: HOSPADM

## 2022-04-12 RX ORDER — LIDOCAINE HYDROCHLORIDE 10 MG/ML
0.1 INJECTION INFILTRATION; PERINEURAL AS NEEDED
Status: DISCONTINUED | OUTPATIENT
Start: 2022-04-12 | End: 2022-04-12 | Stop reason: HOSPADM

## 2022-04-12 RX ORDER — NALOXONE HYDROCHLORIDE 0.4 MG/ML
0.4 INJECTION, SOLUTION INTRAMUSCULAR; INTRAVENOUS; SUBCUTANEOUS AS NEEDED
Status: DISCONTINUED | OUTPATIENT
Start: 2022-04-12 | End: 2022-04-14 | Stop reason: HOSPADM

## 2022-04-12 RX ORDER — ALBUTEROL SULFATE 0.83 MG/ML
2.5 SOLUTION RESPIRATORY (INHALATION)
Status: DISCONTINUED | OUTPATIENT
Start: 2022-04-12 | End: 2022-04-14 | Stop reason: HOSPADM

## 2022-04-12 RX ORDER — LISINOPRIL 5 MG/1
10 TABLET ORAL DAILY
Status: DISCONTINUED | OUTPATIENT
Start: 2022-04-13 | End: 2022-04-14 | Stop reason: HOSPADM

## 2022-04-12 RX ORDER — SUMATRIPTAN 50 MG/1
50 TABLET, FILM COATED ORAL AS NEEDED
Status: DISCONTINUED | OUTPATIENT
Start: 2022-04-12 | End: 2022-04-14 | Stop reason: HOSPADM

## 2022-04-12 RX ORDER — VANCOMYCIN HYDROCHLORIDE 1 G/20ML
INJECTION, POWDER, LYOPHILIZED, FOR SOLUTION INTRAVENOUS AS NEEDED
Status: DISCONTINUED | OUTPATIENT
Start: 2022-04-12 | End: 2022-04-12 | Stop reason: HOSPADM

## 2022-04-12 RX ORDER — SODIUM CHLORIDE, SODIUM LACTATE, POTASSIUM CHLORIDE, CALCIUM CHLORIDE 600; 310; 30; 20 MG/100ML; MG/100ML; MG/100ML; MG/100ML
75 INJECTION, SOLUTION INTRAVENOUS CONTINUOUS
Status: DISCONTINUED | OUTPATIENT
Start: 2022-04-12 | End: 2022-04-12 | Stop reason: HOSPADM

## 2022-04-12 RX ORDER — SODIUM CHLORIDE 9 MG/ML
50 INJECTION, SOLUTION INTRAVENOUS CONTINUOUS
Status: DISCONTINUED | OUTPATIENT
Start: 2022-04-12 | End: 2022-04-12 | Stop reason: HOSPADM

## 2022-04-12 RX ORDER — ROCURONIUM BROMIDE 10 MG/ML
INJECTION, SOLUTION INTRAVENOUS AS NEEDED
Status: DISCONTINUED | OUTPATIENT
Start: 2022-04-12 | End: 2022-04-12 | Stop reason: HOSPADM

## 2022-04-12 RX ORDER — CHLORTHALIDONE 25 MG/1
25 TABLET ORAL DAILY
Status: DISCONTINUED | OUTPATIENT
Start: 2022-04-13 | End: 2022-04-14 | Stop reason: HOSPADM

## 2022-04-12 RX ORDER — CYCLOBENZAPRINE HCL 10 MG
10 TABLET ORAL
Status: DISCONTINUED | OUTPATIENT
Start: 2022-04-12 | End: 2022-04-14 | Stop reason: HOSPADM

## 2022-04-12 RX ORDER — POLYETHYLENE GLYCOL 3350 17 G/17G
17 POWDER, FOR SOLUTION ORAL DAILY
Status: DISCONTINUED | OUTPATIENT
Start: 2022-04-13 | End: 2022-04-14 | Stop reason: HOSPADM

## 2022-04-12 RX ORDER — ACETAMINOPHEN 325 MG/1
650 TABLET ORAL
Status: DISCONTINUED | OUTPATIENT
Start: 2022-04-12 | End: 2022-04-14 | Stop reason: HOSPADM

## 2022-04-12 RX ORDER — SODIUM CHLORIDE 0.9 % (FLUSH) 0.9 %
5-40 SYRINGE (ML) INJECTION EVERY 8 HOURS
Status: DISCONTINUED | OUTPATIENT
Start: 2022-04-12 | End: 2022-04-12 | Stop reason: HOSPADM

## 2022-04-12 RX ORDER — CETIRIZINE HYDROCHLORIDE 5 MG/1
10 TABLET ORAL DAILY
Status: DISCONTINUED | OUTPATIENT
Start: 2022-04-13 | End: 2022-04-14 | Stop reason: HOSPADM

## 2022-04-12 RX ORDER — SODIUM CHLORIDE 0.9 % (FLUSH) 0.9 %
5-40 SYRINGE (ML) INJECTION AS NEEDED
Status: DISCONTINUED | OUTPATIENT
Start: 2022-04-12 | End: 2022-04-14 | Stop reason: HOSPADM

## 2022-04-12 RX ORDER — PROPOFOL 10 MG/ML
INJECTION, EMULSION INTRAVENOUS AS NEEDED
Status: DISCONTINUED | OUTPATIENT
Start: 2022-04-12 | End: 2022-04-12 | Stop reason: HOSPADM

## 2022-04-12 RX ORDER — MIDAZOLAM HYDROCHLORIDE 1 MG/ML
2 INJECTION, SOLUTION INTRAMUSCULAR; INTRAVENOUS
Status: DISCONTINUED | OUTPATIENT
Start: 2022-04-12 | End: 2022-04-12 | Stop reason: HOSPADM

## 2022-04-12 RX ORDER — DEXAMETHASONE SODIUM PHOSPHATE 4 MG/ML
INJECTION, SOLUTION INTRA-ARTICULAR; INTRALESIONAL; INTRAMUSCULAR; INTRAVENOUS; SOFT TISSUE AS NEEDED
Status: DISCONTINUED | OUTPATIENT
Start: 2022-04-12 | End: 2022-04-12 | Stop reason: HOSPADM

## 2022-04-12 RX ORDER — OXYCODONE HYDROCHLORIDE 5 MG/1
5 TABLET ORAL
Status: DISCONTINUED | OUTPATIENT
Start: 2022-04-12 | End: 2022-04-12 | Stop reason: HOSPADM

## 2022-04-12 RX ORDER — DOCUSATE SODIUM 100 MG/1
100 CAPSULE, LIQUID FILLED ORAL
Status: DISCONTINUED | OUTPATIENT
Start: 2022-04-12 | End: 2022-04-14 | Stop reason: HOSPADM

## 2022-04-12 RX ORDER — CEFAZOLIN SODIUM/WATER 2 G/20 ML
2 SYRINGE (ML) INTRAVENOUS EVERY 8 HOURS
Status: COMPLETED | OUTPATIENT
Start: 2022-04-12 | End: 2022-04-13

## 2022-04-12 RX ORDER — OXYCODONE AND ACETAMINOPHEN 5; 325 MG/1; MG/1
1 TABLET ORAL AS NEEDED
Status: DISCONTINUED | OUTPATIENT
Start: 2022-04-12 | End: 2022-04-12 | Stop reason: HOSPADM

## 2022-04-12 RX ORDER — METOPROLOL SUCCINATE 100 MG/1
100 TABLET, EXTENDED RELEASE ORAL DAILY
Status: DISCONTINUED | OUTPATIENT
Start: 2022-04-13 | End: 2022-04-14 | Stop reason: HOSPADM

## 2022-04-12 RX ORDER — METFORMIN HYDROCHLORIDE 500 MG/1
1000 TABLET ORAL
Status: DISCONTINUED | OUTPATIENT
Start: 2022-04-13 | End: 2022-04-14 | Stop reason: HOSPADM

## 2022-04-12 RX ORDER — HYDROMORPHONE HYDROCHLORIDE 2 MG/ML
0.5 INJECTION, SOLUTION INTRAMUSCULAR; INTRAVENOUS; SUBCUTANEOUS
Status: DISCONTINUED | OUTPATIENT
Start: 2022-04-12 | End: 2022-04-12 | Stop reason: HOSPADM

## 2022-04-12 RX ORDER — DESVENLAFAXINE 100 MG/1
100 TABLET, EXTENDED RELEASE ORAL DAILY
Status: DISCONTINUED | OUTPATIENT
Start: 2022-04-13 | End: 2022-04-14 | Stop reason: HOSPADM

## 2022-04-12 RX ORDER — POTASSIUM CHLORIDE 750 MG/1
10 TABLET, EXTENDED RELEASE ORAL DAILY
Status: DISCONTINUED | OUTPATIENT
Start: 2022-04-13 | End: 2022-04-14 | Stop reason: HOSPADM

## 2022-04-12 RX ORDER — LIDOCAINE HYDROCHLORIDE 20 MG/ML
INJECTION, SOLUTION EPIDURAL; INFILTRATION; INTRACAUDAL; PERINEURAL AS NEEDED
Status: DISCONTINUED | OUTPATIENT
Start: 2022-04-12 | End: 2022-04-12 | Stop reason: HOSPADM

## 2022-04-12 RX ORDER — LORAZEPAM 0.5 MG/1
0.5 TABLET ORAL
Status: DISCONTINUED | OUTPATIENT
Start: 2022-04-12 | End: 2022-04-14 | Stop reason: HOSPADM

## 2022-04-12 RX ORDER — SODIUM CHLORIDE, SODIUM LACTATE, POTASSIUM CHLORIDE, CALCIUM CHLORIDE 600; 310; 30; 20 MG/100ML; MG/100ML; MG/100ML; MG/100ML
100 INJECTION, SOLUTION INTRAVENOUS CONTINUOUS
Status: DISCONTINUED | OUTPATIENT
Start: 2022-04-12 | End: 2022-04-12 | Stop reason: HOSPADM

## 2022-04-12 RX ORDER — POLYETHYLENE GLYCOL 3350 17 G/17G
17 POWDER, FOR SOLUTION ORAL AS NEEDED
Status: DISCONTINUED | OUTPATIENT
Start: 2022-04-12 | End: 2022-04-14 | Stop reason: HOSPADM

## 2022-04-12 RX ORDER — HYDROMORPHONE HYDROCHLORIDE 1 MG/ML
1 INJECTION, SOLUTION INTRAMUSCULAR; INTRAVENOUS; SUBCUTANEOUS
Status: DISCONTINUED | OUTPATIENT
Start: 2022-04-12 | End: 2022-04-14 | Stop reason: HOSPADM

## 2022-04-12 RX ORDER — ALOGLIPTIN 12.5 MG/1
12.5 TABLET, FILM COATED ORAL
Status: DISCONTINUED | OUTPATIENT
Start: 2022-04-13 | End: 2022-04-14 | Stop reason: HOSPADM

## 2022-04-12 RX ORDER — BUPIVACAINE HYDROCHLORIDE AND EPINEPHRINE 5; 5 MG/ML; UG/ML
INJECTION, SOLUTION EPIDURAL; INTRACAUDAL; PERINEURAL AS NEEDED
Status: DISCONTINUED | OUTPATIENT
Start: 2022-04-12 | End: 2022-04-12 | Stop reason: HOSPADM

## 2022-04-12 RX ADMIN — Medication 3 AMPULE: at 11:26

## 2022-04-12 RX ADMIN — OXYCODONE AND ACETAMINOPHEN 1 TABLET: 5; 325 TABLET ORAL at 16:09

## 2022-04-12 RX ADMIN — DEXAMETHASONE SODIUM PHOSPHATE 6 MG: 4 INJECTION, SOLUTION INTRAMUSCULAR; INTRAVENOUS at 14:51

## 2022-04-12 RX ADMIN — SODIUM CHLORIDE, SODIUM LACTATE, POTASSIUM CHLORIDE, AND CALCIUM CHLORIDE: 600; 310; 30; 20 INJECTION, SOLUTION INTRAVENOUS at 15:46

## 2022-04-12 RX ADMIN — SODIUM CHLORIDE, SODIUM LACTATE, POTASSIUM CHLORIDE, AND CALCIUM CHLORIDE 75 ML/HR: 600; 310; 30; 20 INJECTION, SOLUTION INTRAVENOUS at 11:25

## 2022-04-12 RX ADMIN — HYDROMORPHONE HYDROCHLORIDE 1 MG: 1 INJECTION, SOLUTION INTRAMUSCULAR; INTRAVENOUS; SUBCUTANEOUS at 20:27

## 2022-04-12 RX ADMIN — PROPOFOL 150 MG: 10 INJECTION, EMULSION INTRAVENOUS at 13:10

## 2022-04-12 RX ADMIN — ROCURONIUM BROMIDE 40 MG: 10 INJECTION, SOLUTION INTRAVENOUS at 13:10

## 2022-04-12 RX ADMIN — FENTANYL CITRATE 100 MCG: 50 INJECTION INTRAMUSCULAR; INTRAVENOUS at 13:10

## 2022-04-12 RX ADMIN — CEFAZOLIN SODIUM 2 G: 100 INJECTION, POWDER, LYOPHILIZED, FOR SOLUTION INTRAVENOUS at 21:09

## 2022-04-12 RX ADMIN — Medication 1 AMPULE: at 21:09

## 2022-04-12 RX ADMIN — FENTANYL CITRATE 50 MCG: 50 INJECTION INTRAMUSCULAR; INTRAVENOUS at 13:57

## 2022-04-12 RX ADMIN — ONDANSETRON 4 MG: 2 INJECTION INTRAMUSCULAR; INTRAVENOUS at 15:30

## 2022-04-12 RX ADMIN — TRAZODONE HYDROCHLORIDE 100 MG: 50 TABLET ORAL at 21:09

## 2022-04-12 RX ADMIN — VANCOMYCIN HYDROCHLORIDE 1000 MG: 1 INJECTION, POWDER, LYOPHILIZED, FOR SOLUTION INTRAVENOUS at 11:26

## 2022-04-12 RX ADMIN — HYDROCODONE BITARTRATE AND ACETAMINOPHEN 1 TABLET: 10; 325 TABLET ORAL at 22:52

## 2022-04-12 RX ADMIN — SODIUM CHLORIDE, PRESERVATIVE FREE 10 ML: 5 INJECTION INTRAVENOUS at 21:10

## 2022-04-12 RX ADMIN — ROCURONIUM BROMIDE 10 MG: 10 INJECTION, SOLUTION INTRAVENOUS at 13:51

## 2022-04-12 RX ADMIN — DEXAMETHASONE SODIUM PHOSPHATE 4 MG: 4 INJECTION, SOLUTION INTRAMUSCULAR; INTRAVENOUS at 14:11

## 2022-04-12 RX ADMIN — FENTANYL CITRATE 25 MCG: 50 INJECTION INTRAMUSCULAR; INTRAVENOUS at 15:11

## 2022-04-12 RX ADMIN — LIDOCAINE HYDROCHLORIDE 60 MG: 20 INJECTION, SOLUTION EPIDURAL; INFILTRATION; INTRACAUDAL; PERINEURAL at 13:10

## 2022-04-12 RX ADMIN — ROCURONIUM BROMIDE 10 MG: 10 INJECTION, SOLUTION INTRAVENOUS at 13:52

## 2022-04-12 RX ADMIN — FENTANYL CITRATE 25 MCG: 50 INJECTION INTRAMUSCULAR; INTRAVENOUS at 15:28

## 2022-04-12 RX ADMIN — ROSUVASTATIN 20 MG: 10 TABLET, FILM COATED ORAL at 21:09

## 2022-04-12 RX ADMIN — PHENYLEPHRINE HYDROCHLORIDE 150 MCG: 10 INJECTION INTRAVENOUS at 14:46

## 2022-04-12 RX ADMIN — Medication 2 G: at 13:24

## 2022-04-12 RX ADMIN — PHENYLEPHRINE HYDROCHLORIDE 150 MCG: 10 INJECTION INTRAVENOUS at 13:49

## 2022-04-12 NOTE — PROGRESS NOTES
Received orders from Dr Jony Solis, orders released. Pt resting in bed with eyes closed. Oxygen on at 2L NC due to pt h/o sleep apnea. Family at bedside , no other needs at this time.

## 2022-04-12 NOTE — ANESTHESIA POSTPROCEDURE EVALUATION
Procedure(s):  C5-C6 ANTERIOR CERVICAL DISCECTOMY WITH FUSION.     general    Anesthesia Post Evaluation      Multimodal analgesia: multimodal analgesia used between 6 hours prior to anesthesia start to PACU discharge  Patient location during evaluation: bedside  Patient participation: complete - patient participated  Level of consciousness: awake  Pain management: adequate  Airway patency: patent  Anesthetic complications: no  Cardiovascular status: acceptable and stable  Respiratory status: acceptable and room air  Hydration status: acceptable  Post anesthesia nausea and vomiting:  none  Final Post Anesthesia Temperature Assessment:  Normothermia (36.0-37.5 degrees C)      INITIAL Post-op Vital signs:   Vitals Value Taken Time   /59 04/12/22 1627   Temp 36.9 °C (98.4 °F) 04/12/22 1621   Pulse 75 04/12/22 1627   Resp 16 04/12/22 1627   SpO2 98 % 04/12/22 1627

## 2022-04-12 NOTE — INTERVAL H&P NOTE
Update History & Physical    The Patient's History and Physical of March 21,   this was reviewed with the patient and I examined the patient. There was no change. The surgical site was confirmed by the patient and me. Plan:  The risk, benefits, expected outcome, and alternative to the recommended procedure have been discussed with the patient. Patient understands and wants to proceed with the procedure.     Electronically signed by Amarilys Gutiérrez MD on 4/12/2022 at 11:49 AM

## 2022-04-12 NOTE — PROGRESS NOTES
Problem: Falls - Risk of  Goal: *Absence of Falls  Description: Document Donell Alston Fall Risk and appropriate interventions in the flowsheet.   Outcome: Progressing Towards Goal  Note: Fall Risk Interventions:

## 2022-04-12 NOTE — PROGRESS NOTES
.. TRANSFER - IN REPORT:    Verbal report received from [facility] and the [facility] on [place] Needle  being received from  PACU. Report consisted of patients Situation, Background, Assessment and   Recommendations. Information from the following report  was reviewed with the receiving nurse. Opportunity for questions and clarification was provided. Assessment completed upon patients arrival to unit and care assumed.

## 2022-04-12 NOTE — PROGRESS NOTES
Just spoke with Dr Sai Lovelace, regarding post-op orders. States that he will log in and release the orders again.

## 2022-04-12 NOTE — PERIOP NOTES
TRANSFER - OUT REPORT:    Verbal report given to DORIS Mandujano on Daron Colon  being transferred to  [phone] for routine post - op       Report consisted of patients Situation, Background, Assessment and   Recommendations(SBAR). Information from the following report(s) SBAR, Procedure Summary, Intake/Output, MAR, Cardiac Rhythm 2 and Quality Measures was reviewed with the receiving nurse. Lines:   Peripheral IV 04/12/22 Posterior;Right Hand (Active)   Site Assessment Clean, dry, & intact 04/12/22 1559   Phlebitis Assessment 0 04/12/22 1559   Infiltration Assessment 0 04/12/22 1559   Dressing Status Clean, dry, & intact 04/12/22 1559   Dressing Type Transparent;Tape 04/12/22 1559   Hub Color/Line Status Patent 04/12/22 1559        Opportunity for questions and clarification was provided. Patient transported with:   O2 @ 2 liters  Tech    VTE prophylaxis orders have been written for Daron Colon. Patient and family given floor number and nurses name. Family updated re: pt status after security code verified.

## 2022-04-12 NOTE — OP NOTES
NEUROSURGERY OPERATIVE REPORT:     Patient Name: Ann Marie Betancourt    Patient MRN: [id number]    Date of Procedure: 4/12/2022    Patient YOB: 1960     Pre-Procedure Diagnosis:   1. Cervical Disc Herniation C5-C6  2. Cervical Radiculopathy C6   3. Degenerative Disc Disease Cerivcal   4. Cervical Spinal Stenosis with Cord Compression C5-C6  5. Neuroforaminal Stenosis of Cervical Spine C5-C6     Post-Procedure Diagnosis: SAME AS ABOVE      Procedure:   1. C5-C6 COMPLETE ANTERIOR CERVICAL DISKECTOMY, OSTEOPHYTECTOMY FOR NERVE ROOT AND SPINAL CORD DECOMPRESSION WITH INTERBODY ARTHRODESIS (CPT: 59110)  2. ANTERIOR PLATE AND SCREW FIXATION FROM C5-C6 CPT: 85670)  3. C5-C6  INTERBODY FIXATION WITH TRITANIUM LORDOTIC GRAFTS/INTERBODY BIOMECHANICAL DEVICE WITH AUTOGRAFT, PROTEOS AND DBM (CPT: O7213239, 91113)  4. INTRAOPERATIVE MICROSCOPE   5. INTRAOPERATIVE FLUOROSCOPY      Anesthesia: General Endotracheal anesthesia      Blood Loss: 31 cc     Surgeon: Kory Severino. Jeannie Fink MD     Anesthesiologist: Yuan Mary MD      Surgical Staff:  See Operative Record     Specimen: None     Indication for Procedure:   Ann Marie Betancourt is a 63 y.o. right-hand-dominant female who presented to clinic for evaluation of chronic neck pain and low back pain. Today she presents to undergo surgery. I have independently reviewed and interpreted the patient's MRI cervical spine without contrast performed at [facility] outpatient clinic on 1/12/2022 that demonstrates cervical spondylosis and degenerative disc disease at C5-C6 and C6-C7. At C5-C6 there is spinal stenosis with bilateral neuroforaminal stenosis in AP diameter the canal measures approximately 7.2 mm on axial images and 8.6 mm on sagittal.  At C5-C6 there is severe left-sided neuroforaminal stenosis and moderate right-sided neuroforaminal stenosis. At C6-C7 there is also spondylosis in effacement of the ventral CSF space without significant compression dorsally.   And a this time I discussed with the patient that she has failed conservative medical measures has evidence of significant neuroforaminal stenosis at C5-C6 that is worse on the left but also significant on the right and spinal stenosis. I believe she would benefit from a C5-C6 anterior cervical discectomy and fusion for decompression of the cervical spine and neuroforamen. Anterior Cervical Fusion Procedures Consent: The relative risks of complication include but are not limited to infection, bleeding, spinal fluid leak, major vascular injury, increased pain, weakness, numbness, non fusion, instrumentation failure, need for re operation, postoperative hematoma that may require surgical evacuation, dysphonia, dysphagia, carotid, jugular, esophogeal and/or tracheal, paralysis, incontinence, impotence, heart attack, stroke and death were discussed with patient  And family members present. They have been provided the opportunity to ask any questions regarding the surgical procedure. The patient and family members present expressed understanding and agree to proceed with surgery. I also discussed with her the risk of dysphonia in the form of hoarseness to whether transient or permanent that is associated with anterior cervical surgery. Patient understands the risk and benefits and agrees to proceed with surgery as outlined above. The patient has failed conservative medical management and would benefit from a C5-C6 Anterior Cervical Discectomy and fusion. Procedure in Detail:   The patient was transported to the OR and placed under general endotracheal anesthesia. A surgical pause time-out was performed at the beginning of the case prior to incision confirming procedure, sterility and functionality of instruments, surgical site, stability of patient, and anti-biotic administration. Next, the patient was prepped and draped in a sterile fashion.  A transverse incision was made in the right anterior cervical area two finger breadths above the clavicle and subcutaneous flap was elevated. The platysma was opened vertically. The cervical investing fascia was opened along the medial border of the sternocleidomastoid muscle. The fascial planes between the carotid sheath and paratracheal fascia were opened with blunt and sharp dissection. The prevertebral fascia was identified and opened sharply. The longus coli muscles were cauterized along the medial border. Next a self-retaining Shadow-line retractor system was placed with the short teeth of the retractor bladed anchored under the Longus Coli. Intraoperative. C-arm fluoroscopic imaging was used to identify the appropriate level. Next, the C5-C6 disk was incised with a electrocautery. The disk material was removed with pituitary rongeurs and curettes. The intraoperative microscope was then brought in to complete the remainder of the discectomy. Next I placed a 12 mm [place] pin in the midline vertebral body of C6 and C5 and placed into space under distraction. The cartilaginous endplates were removed with the MicroCoal Drill. The posterolateral osteophytes were also drilled down and removed with a 1 and 2 mm Kerrison rongeurs. The posterior longitudinal ligament was opened along the width of the spinal canal with the 1 and 2 mm Kerrison rongeurs. Generous foraminotomies were created bilaterally. A blunt ball probe was passed in the epidural space, verifying the dura and nerve were free of compression. Hemostasis was obtained. A 5 mm trial was used and verified to be a good fit for the interbody space. Next a 12 mm long [place] distraction pin was placed in the mid-line of the C6 vertebral body and removed. Then a 5 cc syringe and angiocath was used to aspirate bone marrow from this hole created by the [place] distraction post. The hole was then packed with Bone Wax and hemostasis was achieved.  A 5 mm Tritanium ([place]) lordotic cage packed with autograf and DBM which had been soaked in Proteos was placed in the intervertebral space. Proper placement and positioning was confirmed with intraoperative fluoroscopy. Next the cast bar pins were removed and the [place] pin holes were waxed to ensure hemostasis. The anterior/ventral vertebral osteophytes were then removed with a Lempert Rongeur and leveled for the placement of the anterior plate. Next the K2-Whittier anterior cervical plate was sized to fit from the vertebral bodies of C5-C6 measuring. Two 12 mm bone screws were then placed in each vertebra and locked to the plate with a locking mechanism. The wound was irrigated with copious bacitracin instilled irrigation and vancomycin powder was instilled into the wound. The soft tissue retractor was removed. Hemostasis was obtained. A medium hemovac drain was placed overlying the plate and tunneled exterior through the skin. Platysma was closed with 2-0 Vicryl, the superficial subcutaneous layer was closed with with 2-0 Vicryl interrupted sutures, and the skin with a running subcuticular 4-0 Monocryl suture with Dermabond Prineo Skin glue. The patient tolerated the procedure well and there were no complications. The counts were correct at the end of the case. Disposition: PACU and from PACU to the floor      Adam R. Delma Kehr, [address] [place] [address]

## 2022-04-12 NOTE — ANESTHESIA PREPROCEDURE EVALUATION
Relevant Problems   No relevant active problems       Anesthetic History     PONV          Review of Systems / Medical History  Patient summary reviewed and pertinent labs reviewed    Pulmonary        Sleep apnea: CPAP    Asthma        Neuro/Psych         Headaches and psychiatric history     Cardiovascular    Hypertension: well controlled        Dysrhythmias : atrial fibrillation  Hyperlipidemia    Exercise tolerance: >4 METS  Comments:  On metoprolol and bASa (but only takes bASA around flying)   GI/Hepatic/Renal     GERD: well controlled      PUD     Endo/Other    Diabetes: well controlled, type 2    Arthritis     Other Findings   Comments: Neurogenic bladder           Physical Exam    Airway  Mallampati: II  TM Distance: 4 - 6 cm  Neck ROM: normal range of motion   Mouth opening: Diminished (comment)    Comments: Decreased mouth opening Cardiovascular  Regular rate and rhythm,  S1 and S2 normal,  no murmur, click, rub, or gallop  Rhythm: regular  Rate: normal         Dental  No notable dental hx       Pulmonary  Breath sounds clear to auscultation               Abdominal  GI exam deferred       Other Findings            Anesthetic Plan    ASA: 3  Anesthesia type: general          Induction: Intravenous  Anesthetic plan and risks discussed with: Patient      lightwand planned

## 2022-04-12 NOTE — PROGRESS NOTES
Problem: Falls - Risk of  Goal: *Absence of Falls  Description: Document Renetta Mae Fall Risk and appropriate interventions in the flowsheet.   Outcome: Progressing Towards Goal  Note: Fall Risk Interventions:  Mobility Interventions: Bed/chair exit alarm         Medication Interventions: Patient to call before getting OOB                   Problem: Patient Education: Go to Patient Education Activity  Goal: Patient/Family Education  Outcome: Progressing Towards Goal     Problem: General Medical Care Plan  Goal: *Vital signs within specified parameters  Outcome: Progressing Towards Goal  Goal: *Labs within defined limits  Outcome: Progressing Towards Goal  Goal: *Absence of infection signs and symptoms  Outcome: Progressing Towards Goal  Goal: *Optimal pain control at patient's stated goal  Outcome: Progressing Towards Goal  Goal: *Skin integrity maintained  Outcome: Progressing Towards Goal  Goal: *Fluid volume balance  Outcome: Progressing Towards Goal  Goal: *Optimize nutritional status  Outcome: Progressing Towards Goal  Goal: *Anxiety reduced or absent  Outcome: Progressing Towards Goal  Goal: *Progressive mobility and function (eg: ADL's)  Outcome: Progressing Towards Goal     Problem: Patient Education: Go to Patient Education Activity  Goal: Patient/Family Education  Outcome: Progressing Towards Goal

## 2022-04-13 LAB
GLUCOSE BLD STRIP.AUTO-MCNC: 84 MG/DL (ref 65–100)
SERVICE CMNT-IMP: NORMAL

## 2022-04-13 PROCEDURE — G0378 HOSPITAL OBSERVATION PER HR: HCPCS

## 2022-04-13 PROCEDURE — 96374 THER/PROPH/DIAG INJ IV PUSH: CPT

## 2022-04-13 PROCEDURE — 94760 N-INVAS EAR/PLS OXIMETRY 1: CPT

## 2022-04-13 PROCEDURE — 74011250636 HC RX REV CODE- 250/636: Performed by: NEUROLOGICAL SURGERY

## 2022-04-13 PROCEDURE — 94664 DEMO&/EVAL PT USE INHALER: CPT

## 2022-04-13 PROCEDURE — 74011250637 HC RX REV CODE- 250/637: Performed by: NEUROLOGICAL SURGERY

## 2022-04-13 PROCEDURE — 97165 OT EVAL LOW COMPLEX 30 MIN: CPT

## 2022-04-13 PROCEDURE — 97161 PT EVAL LOW COMPLEX 20 MIN: CPT

## 2022-04-13 PROCEDURE — 97530 THERAPEUTIC ACTIVITIES: CPT

## 2022-04-13 PROCEDURE — 94640 AIRWAY INHALATION TREATMENT: CPT

## 2022-04-13 PROCEDURE — 77010033678 HC OXYGEN DAILY

## 2022-04-13 PROCEDURE — 82962 GLUCOSE BLOOD TEST: CPT

## 2022-04-13 PROCEDURE — 74011000250 HC RX REV CODE- 250: Performed by: NEUROLOGICAL SURGERY

## 2022-04-13 RX ORDER — PHENAZOPYRIDINE HYDROCHLORIDE 95 MG/1
95 TABLET ORAL
Status: DISCONTINUED | OUTPATIENT
Start: 2022-04-13 | End: 2022-04-14 | Stop reason: HOSPADM

## 2022-04-13 RX ORDER — BISACODYL 5 MG
5 TABLET, DELAYED RELEASE (ENTERIC COATED) ORAL DAILY PRN
Status: DISCONTINUED | OUTPATIENT
Start: 2022-04-13 | End: 2022-04-14 | Stop reason: HOSPADM

## 2022-04-13 RX ORDER — PHENAZOPYRIDINE HYDROCHLORIDE 100 MG/1
100 TABLET, FILM COATED ORAL
Qty: 30 TABLET | Refills: 0 | Status: SHIPPED | OUTPATIENT
Start: 2022-04-13 | End: 2022-04-23

## 2022-04-13 RX ORDER — NITROFURANTOIN 25; 75 MG/1; MG/1
100 CAPSULE ORAL 2 TIMES DAILY
Qty: 10 CAPSULE | Refills: 0 | Status: SHIPPED | OUTPATIENT
Start: 2022-04-13 | End: 2022-04-18

## 2022-04-13 RX ADMIN — ALOGLIPTIN 12.5 MG: 12.5 TABLET, FILM COATED ORAL at 08:48

## 2022-04-13 RX ADMIN — CETIRIZINE HYDROCHLORIDE 10 MG: 5 TABLET ORAL at 08:47

## 2022-04-13 RX ADMIN — CEFAZOLIN SODIUM 2 G: 100 INJECTION, POWDER, LYOPHILIZED, FOR SOLUTION INTRAVENOUS at 05:03

## 2022-04-13 RX ADMIN — METOPROLOL SUCCINATE 100 MG: 100 TABLET, EXTENDED RELEASE ORAL at 08:49

## 2022-04-13 RX ADMIN — POLYETHYLENE GLYCOL 3350 17 G: 17 POWDER, FOR SOLUTION ORAL at 08:47

## 2022-04-13 RX ADMIN — HYDROCODONE BITARTRATE AND ACETAMINOPHEN 1 TABLET: 10; 325 TABLET ORAL at 08:49

## 2022-04-13 RX ADMIN — SODIUM CHLORIDE, PRESERVATIVE FREE 10 ML: 5 INJECTION INTRAVENOUS at 05:03

## 2022-04-13 RX ADMIN — Medication 1 AMPULE: at 08:48

## 2022-04-13 RX ADMIN — TRAZODONE HYDROCHLORIDE 100 MG: 50 TABLET ORAL at 21:16

## 2022-04-13 RX ADMIN — HYDROMORPHONE HYDROCHLORIDE 1 MG: 1 INJECTION, SOLUTION INTRAMUSCULAR; INTRAVENOUS; SUBCUTANEOUS at 03:08

## 2022-04-13 RX ADMIN — SODIUM CHLORIDE, PRESERVATIVE FREE 10 ML: 5 INJECTION INTRAVENOUS at 13:26

## 2022-04-13 RX ADMIN — CEFAZOLIN SODIUM 2 G: 100 INJECTION, POWDER, LYOPHILIZED, FOR SOLUTION INTRAVENOUS at 13:18

## 2022-04-13 RX ADMIN — HYDROMORPHONE HYDROCHLORIDE 1 MG: 1 INJECTION, SOLUTION INTRAMUSCULAR; INTRAVENOUS; SUBCUTANEOUS at 21:17

## 2022-04-13 RX ADMIN — LISINOPRIL 10 MG: 5 TABLET ORAL at 08:48

## 2022-04-13 RX ADMIN — ROSUVASTATIN 20 MG: 10 TABLET, FILM COATED ORAL at 21:16

## 2022-04-13 RX ADMIN — METFORMIN HYDROCHLORIDE 1000 MG: 500 TABLET ORAL at 08:48

## 2022-04-13 RX ADMIN — SODIUM CHLORIDE, PRESERVATIVE FREE 10 ML: 5 INJECTION INTRAVENOUS at 21:17

## 2022-04-13 RX ADMIN — POTASSIUM CHLORIDE 10 MEQ: 750 TABLET, EXTENDED RELEASE ORAL at 08:49

## 2022-04-13 RX ADMIN — HYDROMORPHONE HYDROCHLORIDE 1 MG: 1 INJECTION, SOLUTION INTRAMUSCULAR; INTRAVENOUS; SUBCUTANEOUS at 13:18

## 2022-04-13 RX ADMIN — CHLORTHALIDONE 25 MG: 25 TABLET ORAL at 08:49

## 2022-04-13 RX ADMIN — Medication 1 AMPULE: at 21:17

## 2022-04-13 RX ADMIN — ALBUTEROL SULFATE 2.5 MG: 2.5 SOLUTION RESPIRATORY (INHALATION) at 03:34

## 2022-04-13 NOTE — PROGRESS NOTES
CM screened chart for potential discharge needs. No CM consult received. CM in to see patient at bedside. Patient is alert and laying in bed.  at bedside. Demographics, PCP and insurance reviewed. Therapy recommendations for [facility] PT and 3 in 1 [facility] reviewed and patient and spouse are agreeable. List of medicare certified [facility] reviewed and patient is agreeable for referral to be sent to [facility] as they have immediate availability in her area. Order placed, referral sent and admissions liaison notified. Order placed for DME, referral sent to [facility] and 3 in 1 [facility] will be delivered prior to discharge. Care Management Interventions  PCP Verified by CM: Yes  Mode of Transport at Discharge: Other (see comment) (Spouse)  Discharge Durable Medical Equipment: Yes (3 in 1 [facility])  Physical Therapy Consult: Yes  Occupational Therapy Consult: Yes  Support Systems: Spouse/Significant Other  Confirm Follow Up Transport: Family  The Plan for Transition of Care is Related to the Following Treatment Goals : Patient will participate in [facility] therapies in order to return to safe baseline independence in ADLs.   The Patient and/or Patient Representative was Provided with a Choice of Provider and Agrees with the Discharge Plan?: Yes  Freedom of Choice List was Provided with Basic Dialogue that Supports the Patient's Individualized Plan of Care/Goals, Treatment Preferences and Shares the Quality Data Associated with the Providers?: Yes  Discharge Location  Patient Expects to be Discharged to[de-identified] Home with home health

## 2022-04-13 NOTE — PROGRESS NOTES
ACUTE OT GOALS:  (Developed with and agreed upon by patient and/or caregiver.)  1. Jenifer Camarillo will be modified independent with functional mobility for ADL in room within 1 visit. Met  2. Jenifer Camarillo will voice a plan for appropriate home modifications for home safety and fall prevention within 1 visit. Met    OCCUPATIONAL THERAPY ASSESSMENT: Initial Assessment, Daily Note, and Discharge OT Treatment Day # 1    Jenifer Camarillo is a 63 y.o. female   PRIMARY DIAGNOSIS: <principal problem not specified>  Cervical spinal stenosis [M48.02]  Degeneration, intervertebral disc, cervical [M50.30]  Cervical radiculitis [M54.12]  Neural foraminal stenosis of cervical spine [M48.02]  DDD (degenerative disc disease), cervical [M50.30]  Cervical radiculopathy [M54.12]  Neuroforaminal stenosis of cervical spine [M48.02]  Procedure(s) (LRB):  C5-C6 ANTERIOR CERVICAL DISCECTOMY WITH FUSION (N/A)  1 Day Post-Op  Reason for Referral:    ICD-10: Treatment Diagnosis: Cervicalgia (M54.2)  OBSERVATION: [place] / [place] PART [place] B / Product Type: Medicare /   ASSESSMENT:     REHAB RECOMMENDATIONS:   Recommendation to date pending progress:  Setting:   No further skilled therapy after discharge from hospital  Equipment:    3 in 1 Bedside Commode     PRIOR LEVEL OF FUNCTION:  (Prior to Hospitalization)  INITIAL/CURRENT LEVEL OF FUNCTION:  (Based on today's evaluation)   Bathing:   Independent  Dressing:   Independent  Feeding/Grooming:   Independent  Toileting:   Independent  Functional Mobility:   Independent Bathing:   Modified Independent  Dressing:   Modified Independent  Feeding/Grooming:   Independent  Toileting:   Modified Independent  Functional Mobility:   Modified Independent     ASSESSMENT:  Ms. Dalia Watson is s/p the above procedure.   She reports she lives in a tri-level home with 5 steps to enter (no railing) stating she is dependent on her  to ascend/descend steps; however,  currently has illness for which he is receiving antibiotics per patient. She reports a history of mental health problems and she has had little sleep last night. Upon assessment her B UE demonstrate grossly 4+/5 strength with stiffness noted in her R shoulder for which she was receiving outpatient PT prior to admission. She is completing ADL/functional mobility for ADL with modified independence/independence. She may benefit from a seat she can place in the shower. No further needs for acute occupational therapy at this time. SUBJECTIVE:   Ms. Jeferson Guzman states, \"I had 2 hours sleep. \"    SOCIAL HISTORY/LIVING ENVIRONMENT: See assessment.         OBJECTIVE:     PAIN: VITAL SIGNS: LINES/DRAINS:   Pre Treatment: Pain Screen  Pain Scale 1: Numeric (0 - 10)  Pain Intensity 1: 0  Post Treatment: no complaints   IV and TJ Drain  O2 Device: Nasal cannula     GROSS EVALUATION:   Within Functional Limits Abnormal/ Functional Abnormal/ Non-Functional (see comments) Not Tested Comments:   AROM [] [x] [] [] R shoulder flexion ~130°    PROM [] [] [] [x]    Strength [] [x] [] []    Balance [] [x] [] []    Posture [] [x] [] []    Sensation [x] [] [] [] B UE    Coordination [x] [] [] []    Tone [] [] [] [x]    Edema [] [] [] [x]    Activity Tolerance [] [x] [] []     [] [] [] []      COGNITION/  PERCEPTION: Intact Impaired   (see comments) Comments:   Orientation [x] []    Vision [] []    Hearing [] []    Judgment/ Insight [x] []    Attention [] []    Memory [] []    Command Following [x] []    Emotional Regulation [] []     [] []      ACTIVITIES OF DAILY LIVING: I Mod I S SBA CGA Min Mod Max Total NT Comments   BASIC ADLs:              Bathing/ Showering [] [] [] [] [] [] [] [] [] [x]    Toileting [] [] [] [] [] [] [] [] [] [x]    Dressing [] [x] [] [] [] [] [] [] [] [] L sock don/doff   Feeding [] [] [] [] [] [] [] [] [] [x]    Grooming [] [] [] [] [] [] [] [] [] [x]    Personal Device Care [] [] [] [] [] [] [] [] [] [x]    Functional Mobility [] [x] [] [] [] [] [] [] [] []    I=Independent, Mod I=Modified Independent, S=Supervision, SBA=Standby Assistance, CGA=Contact Guard Assistance,   Min=Minimal Assistance, Mod=Moderate Assistance, Max=Maximal Assistance, Total=Total Assistance, NT=Not Tested    MOBILITY: I Mod I S SBA CGA Min Mod Max Total  NT x2 Comments:   Supine to sit [] [] [] [] [] [] [] [] [] [x] []    Sit to supine [] [] [] [] [] [] [] [] [] [x] []    Sit to stand [x] [] [] [] [] [] [] [] [] [] []    Bed to chair [] [] [] [] [] [] [] [] [] [x] []    I=Independent, Mod I=Modified Independent, S=Supervision, SBA=Standby Assistance, CGA=Contact Guard Assistance,   Min=Minimal Assistance, Mod=Moderate Assistance, Max=Maximal Assistance, Total=Total Assistance, NT=Not [facility] [address]   Daily Activity Inpatient Short Form        How much help from another person does the patient currently need. .. Total A Lot A Little None   1. Putting on and taking off regular lower body clothing? [] 1   [] 2   [] 3   [x] 4   2. Bathing (including washing, rinsing, drying)? [] 1   [] 2   [x] 3   [] 4   3. Toileting, which includes using toilet, bedpan or urinal?   [] 1   [] 2   [] 3   [x] 4   4. Putting on and taking off regular upper body clothing? [] 1   [] 2   [] 3   [x] 4   5. Taking care of personal grooming such as brushing teeth? [] 1   [] 2   [] 3   [x] 4   6. Eating meals   [] 1   [] 2   [] 3   [x] 4   © 2007, Trustees of [facility], under license to [employer]. All rights reserved     Score:  Initial: 23 Most Recent: X (Date: -- )   Interpretation of Tool:  Represents activities that are increasingly more difficult (i.e. Bed mobility, Transfers, Gait).     PLAN:   FREQUENCY/DURATION: OT Plan of Care:  (Today's Assessment only) for duration of hospital stay or until stated goals are met, whichever comes first.    PROBLEM LIST:   (Skilled intervention is medically necessary to address:)  1. Decreased ADL/Functional Activities  2. Decreased Activity Tolerance  3. Decreased AROM/PROM  4. Decreased Balance  5. Decreased Strength  6. Decreased Transfer Abilities   INTERVENTIONS PLANNED:   (Benefits and precautions of occupational therapy have been discussed with the patient.)  1. Self Care Training  2. Therapeutic Activity  3. Therapeutic Exercise/HEP  4. Neuromuscular Re-education  5. Education     TREATMENT:     EVALUATION: Low Complexity : (Untimed Charge)    TREATMENT:   (     )  Therapeutic Activity (9 Minutes): Therapeutic activity included Scooting, Sitting balance , Standing balance, and functional mobility for ADL with modified independence/don/doff sock with modified independence to improve functional Mobility, ROM, and Activity tolerance.     TREATMENT GRID:  N/A    AFTER TREATMENT POSITION/PRECAUTIONS:  Chair, Needs within reach, and RN notified    INTERDISCIPLINARY COLLABORATION:  RN/PCT and OT/KHALIL    TOTAL TREATMENT DURATION:  OT Patient Time In/Time Out  Time In: 1115  Time Out: [address] RUI Walters, OTR/L

## 2022-04-13 NOTE — PROGRESS NOTES
[facility]    Assessment: S/P C5-6 ACDF    POD#1    Plan:  -remove mccullough  -remove drain  -PT to mobilize  -pyridium ordered for bladder spasms  -dulcolax orded for constipation  -discharge tomorrow    Subjective:    Objective:  Afebrile  VSS  GCS15  Moving all extremities  Incision CDI  Drain output 20cc yesterday    Patient Vitals for the past 12 hrs:   Temp Pulse Resp BP SpO2   04/13/22 0747 98.9 °F (37.2 °C) 96 17 112/75 99 %   04/13/22 0604 97.6 °F (36.4 °C) 90 17 117/74 100 %   04/13/22 0334     100 %   04/12/22 2329 97.6 °F (36.4 °C) 81 17 112/76 98 %        Recent Results (from the past 24 hour(s))   GLUCOSE, POC    Collection Time: 04/12/22 11:20 AM   Result Value Ref Range    Glucose (POC) 95 65 - 100 mg/dL    Performed by Alicia    GLUCOSE, POC    Collection Time: 04/12/22  4:19 PM   Result Value Ref Range    Glucose (POC) 108 (H) 65 - 100 mg/dL    Performed by Boby Morin    GLUCOSE, POC    Collection Time: 04/12/22  9:07 PM   Result Value Ref Range    Glucose (POC) 180 (H) 65 - 100 mg/dL    Performed by Ole Strong, NP

## 2022-04-13 NOTE — PROGRESS NOTES
ACUTE PHYSICAL THERAPY GOALS:  (Developed with and agreed upon by patient and/or caregiver.)  (1.) Arie Case  will move from supine to sit and sit to supine  with INDEPENDENCE within 7 treatment day(s). (2.) Arie Case will transfer from bed to chair and chair to bed with MODIFIED INDEPENDENCE using the least restrictive device within 7 treatment day(s). (3.) Arie Case will ambulate with MODIFIED INDEPENDENCE for 500 feet with the least restrictive device within 7 treatment day(s). (4.) Arie Case will perform standing static and dynamic balance activities x 20 minutes with SUPERVISION to improve safety within 7 treatment day(s). (5.) Arie Case will ascend and descend 5 stairs using 1 hand rail(s) with SUPERVISION to improve functional mobility and safety within 7 treatment day(s). (6.) Arie Case will perform bilateral lower extremity exercises x 20 min for HEP with INDEPENDENCE to improve strength, endurance, and functional mobility within 7 treatment day(s).      PHYSICAL THERAPY: Daily Note and PM Treatment Day # 1    Arie Case is a 63 y.o. female   PRIMARY DIAGNOSIS: <principal problem not specified>  Cervical spinal stenosis [M48.02]  Degeneration, intervertebral disc, cervical [M50.30]  Cervical radiculitis [M54.12]  Neural foraminal stenosis of cervical spine [M48.02]  DDD (degenerative disc disease), cervical [M50.30]  Cervical radiculopathy [M54.12]  Neuroforaminal stenosis of cervical spine [M48.02]  Procedure(s) (LRB):  C5-C6 ANTERIOR CERVICAL DISCECTOMY WITH FUSION (N/A)  1 Day Post-Op    ASSESSMENT:     REHAB RECOMMENDATIONS: CURRENT LEVEL OF FUNCTION:  (Most Recently Demonstrated)   Recommendation to date pending progress:  [address] [place] [address]  Equipment:    3 in 1 Bedside Commode Bed Mobility:   Contact Guard Assistance  Sit to Stand:  [employer] Assistance  Transfers:   Not tested  Gait/Mobility:  Yaquelin Simpson ASSESSMENT:  Ms. Samanta Hardwick was received supine in bed, agreeable to therapy. She was able to ambulate 500 ft with SPC and SBA. Bed mobility via log roll technique with CGA. Reviewed spinal precautions, able to state all 3 without cueing and able to maintain them throughout mobility with minimal cueing. Good progress, will continue to follow.       SUBJECTIVE:   Ms. Samanta Hardwick states, \"thank you\"    SOCIAL HISTORY/ LIVING ENVIRONMENT: see eval  Support Systems: Spouse/Significant Other  OBJECTIVE:     PAIN: VITAL SIGNS: LINES/DRAINS:   Pre Treatment: Pain Screen  Pain Scale 1: Numeric (0 - 10)  Pain Intensity 1: 0  Post Treatment: 0   None  O2 Device: Nasal cannula     MOBILITY: I Mod I S SBA CGA Min Mod Max Total  NT x2 Comments:   Bed Mobility    Rolling [] [] [] [] [x] [] [] [] [] [] []    Supine to Sit [] [] [] [] [x] [] [] [] [] [] []    Scooting [] [] [] [] [x] [] [] [] [] [] []    Sit to Supine [] [] [] [] [x] [] [] [] [] [] []    Transfers    Sit to Stand [] [] [] [x] [] [] [] [] [] [] []    Bed to Chair [] [] [] [] [] [] [] [] [] [] [x]    Stand to Sit [] [] [] [x] [] [] [] [] [] [] []    I=Independent, Mod I=Modified Independent, S=Supervision, SBA=Standby Assistance, CGA=Contact Guard Assistance,   Min=Minimal Assistance, Mod=Moderate Assistance, Max=Maximal Assistance, Total=Total Assistance, NT=Not Tested    BALANCE: Good Fair+ Fair Fair- Poor NT Comments   Sitting Static [x] [] [] [] [] []    Sitting Dynamic [x] [] [] [] [] []              Standing Static [] [x] [] [] [] []    Standing Dynamic [] [x] [] [] [] []      GAIT: I Mod I S SBA CGA Min Mod Max Total  NT x2 Comments:   Level of Assistance [] [] [] [x] [] [] [] [] [] [] []    Distance 500 ft    DME SPC    Gait Quality Decreased gait speed, some unsteadiness    Weightbearing  Status N/A     I=Independent, Mod I=Modified Independent, S=Supervision, SBA=Standby Assistance, CGA=Contact Guard Assistance,   Min=Minimal Assistance, Mod=Moderate Assistance, Max=Maximal Assistance, Total=Total Assistance, NT=Not Tested    PLAN:   FREQUENCY/DURATION: PT Plan of Care: BID for duration of hospital stay or until stated goals are met, whichever comes first.  TREATMENT:     TREATMENT:   (     )  Therapeutic Activity (17 Minutes): Therapeutic activity included Rolling, Supine to Sit, Sit to Supine, Scooting, Ambulation on level ground, Sitting balance  and Standing balance to improve functional Mobility, Strength and Activity tolerance.     TREATMENT GRID:  N/A    AFTER TREATMENT POSITION/PRECAUTIONS:  Bed, Needs within reach, RN notified and Visitors at bedside    INTERDISCIPLINARY COLLABORATION:  RN/PCT    TOTAL TREATMENT DURATION:  PT Patient Time In/Time Out  Time In: 1433  Time Out: [facility],

## 2022-04-13 NOTE — PROGRESS NOTES
ACUTE PHYSICAL THERAPY GOALS:  (Developed with and agreed upon by patient and/or caregiver.)    (1.) Gracie Fowler  will move from supine to sit and sit to supine  with INDEPENDENCE within 7 treatment day(s). (2.) Gracie Fowler will transfer from bed to chair and chair to bed with MODIFIED INDEPENDENCE using the least restrictive device within 7 treatment day(s). (3.) Gracie Fowler will ambulate with MODIFIED INDEPENDENCE for 500 feet with the least restrictive device within 7 treatment day(s). (4.) Gracie Fowler will perform standing static and dynamic balance activities x 20 minutes with SUPERVISION to improve safety within 7 treatment day(s). (5.) Gracie Fowler will ascend and descend 5 stairs using 1 hand rail(s) with SUPERVISION to improve functional mobility and safety within 7 treatment day(s). (6.) Gracie Fowler will perform bilateral lower extremity exercises x 20 min for HEP with INDEPENDENCE to improve strength, endurance, and functional mobility within 7 treatment day(s).      PHYSICAL THERAPY ASSESSMENT: Initial Assessment, Daily Note and AM PT Treatment Day # 1      Gracie Fowler is a 63 y.o. female   PRIMARY DIAGNOSIS: <principal problem not specified>  Cervical spinal stenosis [M48.02]  Degeneration, intervertebral disc, cervical [M50.30]  Cervical radiculitis [M54.12]  Neural foraminal stenosis of cervical spine [M48.02]  DDD (degenerative disc disease), cervical [M50.30]  Cervical radiculopathy [M54.12]  Neuroforaminal stenosis of cervical spine [M48.02]  Procedure(s) (LRB):  C5-C6 ANTERIOR CERVICAL DISCECTOMY WITH FUSION (N/A)  1 Day Post-Op  Reason for Referral:    ICD-10: Treatment Diagnosis: Difficulty in walking, Not elsewhere classified (R26.2)  History of falling (Z91.81)  Cervicalgia (M54.2)  OBSERV[place]: [place] / [place] / Product Type: Medicare /     ASSESSMENT:     REHAB RECOMMENDATIONS:   Recommendation to date pending progress:  [facility]  Equipment:    3 in 1 Bedside Commode     PRIOR LEVEL OF FUNCTION:  (Prior to Hospitalization) INITIAL/CURRENT LEVEL OF FUNCTION:  (Most Recently Demonstrated)   Bed Mobility:   Independent  Sit to Stand:   Modified Independent  Transfers:   Modified Independent  Gait/Mobility:   Modified Independent Bed Mobility:   Contact Guard Assistance  Sit to Stand:  [facility] [employer] Assistance  Transfers:   Standby Assistance  Gait/Mobility:   Standby Assistance     ASSESSMENT:  Ms. Annmarie Muller  is a 63year old F who presents s/p above procedure POD 1. At baseline, pt is independent, uses a cane for ambulation. Does endorse history of 2 recent falls. This date pt performs mobility including bed mobility with CGA via log roll technique. She was able to ambulate 300 ft with SPC and SBA. Toilet and chair transfers with SBA. Pt educated on spinal precautions, able to maintain them throughout mobility with minimal cueing. Pt presents as functioning below her baseline, with deficits in mobility including transfers, gait, balance, and activity tolerance. Pt will benefit from skilled therapy services to address stated deficits to promote return to highest level of function, independence, and safety. Will continue to follow.      SUBJECTIVE:   Ms. Annmarie Muller states, \"I'd prefer to go home tomorrow\"    SOCIAL HISTORY/LIVING ENVIRONMENT: PLOF: lives with spouse in home with 5 GARCIA, 2 recent falls,cane for ambulation     OBJECTIVE:     PAIN: VITAL SIGNS: LINES/DRAINS:   Pre Treatment:  0  Post Treatment: 0   Hemovac  O2 Device: Nasal cannula     GROSS EVALUATION:  B LE Within Functional Limits Abnormal/ Functional Abnormal/ Non-Functional (see comments) Not Tested Comments:   AROM [x] [] [] []    PROM [] [] [] [x]    Strength [x] [] [] []    Balance [] [x] [] [] Good sitting, fair+ standing    Posture [x] [] [] []    Sensation [x] [] [] []    Coordination [] [] [] [x]    Tone [] [] [] [x] Edema [] [] [] [x]    Activity Tolerance [] [x] [] [] Below baseline    [] [] [] []      COGNITION/  PERCEPTION: Intact Impaired   (see comments) Comments:   Orientation [x] []    Vision [x] []    Hearing [x] []    Command Following [x] []    Safety Awareness [x] []     [] []      MOBILITY: I Mod I S SBA CGA Min Mod Max Total  NT x2 Comments:   Bed Mobility    Rolling [] [] [] [] [x] [] [] [] [] [] []    Supine to Sit [] [] [] [] [x] [] [] [] [] [] []    Scooting [] [] [] [] [x] [] [] [] [] [] []    Sit to Supine [] [] [] [] [] [] [] [] [] [x] []    Transfers    Sit to Stand [] [] [] [x] [] [] [] [] [] [] []    Bed to Chair [] [] [] [x] [] [] [] [] [] [] []    Stand to Sit [] [] [] [x] [] [] [] [] [] [] []    I=Independent, Mod I=Modified Independent, S=Supervision, SBA=Standby Assistance, CGA=Contact Guard Assistance,   Min=Minimal Assistance, Mod=Moderate Assistance, Max=Maximal Assistance, Total=Total Assistance, NT=Not Tested  GAIT: I Mod I S SBA CGA Min Mod Max Total  NT x2 Comments:   Level of Assistance [] [] [] [x] [] [] [] [] [] [] []    Distance 300 ft    DME SPC    Gait Quality Decreased gait speed    Weightbearing Status N/A     I=Independent, Mod I=Modified Independent, S=Supervision, SBA=Standby Assistance, CGA=Contact Guard Assistance,   Min=Minimal Assistance, Mod=Moderate Assistance, Max=Maximal Assistance, Total=Total Assistance, NT=Not Tested    [address]\"" [address] [address] [facility] [address] [facility] Short Form       How much difficulty does the patient currently have. .. Unable A Lot A Little None   1. Turning over in bed (including adjusting bedclothes, sheets and blankets)? [] 1   [] 2   [x] 3   [] 4   2. Sitting down on and standing up from a chair with arms ( e.g., wheelchair, bedside commode, etc.)   [] 1   [] 2   [x] 3   [] 4   3. Moving from lying on back to sitting on the side of the bed?    [] 1   [] 2   [x] 3   [] 4   How much help from another person does the patient currently need. .. Total A Lot A Little None   4. Moving to and from a bed to a chair (including a wheelchair)? [] 1   [] 2   [x] 3   [] 4   5. Need to walk in hospital room? [] 1   [] 2   [x] 3   [] 4   6. Climbing 3-5 steps with a railing? [] 1   [] 2   [x] 3   [] 4   © 2007, Trustees of [facility] [employer], under license to [employer]. All rights reserved     Score:  Initial: 18 Most Recent: X (Date: -- )    Interpretation of Tool:  Represents activities that are increasingly more difficult (i.e. Bed mobility, Transfers, Gait). PLAN:   FREQUENCY/DURATION: PT Plan of Care: BID for duration of hospital stay or until stated goals are met, whichever comes first.    PROBLEM LIST:   (Skilled intervention is medically necessary to address:)  1. Decreased ADL/Functional Activities  2. Decreased Activity Tolerance  3. Decreased Balance  4. Decreased Gait Ability  5. Decreased Strength  6. Decreased Transfer Abilities  7. Increased Pain   INTERVENTIONS PLANNED:   (Benefits and precautions of physical therapy have been discussed with the patient.)  1. Therapeutic Activity  2. Therapeutic Exercise/HEP  3. Neuromuscular Re-education  4. Gait Training  5. Education     TREATMENT:     EVALUATION: Low Complexity : (Untimed Charge)    TREATMENT:   (     )  Therapeutic Activity (23 Minutes): Therapeutic activity included Rolling, Supine to Sit, Scooting, Transfer Training, Ambulation on level ground, Sitting balance  and Standing balance to improve functional Mobility, Strength and Activity tolerance.     TREATMENT GRID:  N/A    AFTER TREATMENT POSITION/PRECAUTIONS:  Chair, Needs within reach, RN notified and Visitors at bedside    INTERDISCIPLINARY COLLABORATION:  RN/PCT    TOTAL TREATMENT DURATION:  PT Patient Time In/Time Out  Time In: 1035  Time Out: [place] [address], PT

## 2022-04-13 NOTE — PROGRESS NOTES
Problem: Falls - Risk of  Goal: *Absence of Falls  Description: Document Mesha Kauffman Fall Risk and appropriate interventions in the flowsheet.   Outcome: Progressing Towards Goal  Note: Fall Risk Interventions:  Mobility Interventions: Bed/chair exit alarm         Medication Interventions: Patient to call before getting OOB                   Problem: Patient Education: Go to Patient Education Activity  Goal: Patient/Family Education  Outcome: Progressing Towards Goal     Problem: General Medical Care Plan  Goal: *Vital signs within specified parameters  Outcome: Progressing Towards Goal  Goal: *Labs within defined limits  Outcome: Progressing Towards Goal  Goal: *Absence of infection signs and symptoms  Outcome: Progressing Towards Goal  Goal: *Optimal pain control at patient's stated goal  Outcome: Progressing Towards Goal  Goal: *Skin integrity maintained  Outcome: Progressing Towards Goal  Goal: *Fluid volume balance  Outcome: Progressing Towards Goal  Goal: *Optimize nutritional status  Outcome: Progressing Towards Goal  Goal: *Anxiety reduced or absent  Outcome: Progressing Towards Goal  Goal: *Progressive mobility and function (eg: ADL's)  Outcome: Progressing Towards Goal     Problem: Patient Education: Go to Patient Education Activity  Goal: Patient/Family Education  Outcome: Progressing Towards Goal

## 2022-04-13 NOTE — PROGRESS NOTES
Problem: Falls - Risk of  Goal: *Absence of Falls  Description: Document Poonam Fall Risk and appropriate interventions in the flowsheet.   Outcome: Progressing Towards Goal  Note: Fall Risk Interventions:  Mobility Interventions: Bed/chair exit alarm         Medication Interventions: Bed/chair exit alarm                   Problem: Patient Education: Go to Patient Education Activity  Goal: Patient/Family Education  Outcome: Progressing Towards Goal     Problem: General Medical Care Plan  Goal: *Vital signs within specified parameters  Outcome: Progressing Towards Goal  Goal: *Labs within defined limits  Outcome: Progressing Towards Goal  Goal: *Absence of infection signs and symptoms  Outcome: Progressing Towards Goal  Goal: *Optimal pain control at patient's stated goal  Outcome: Progressing Towards Goal  Goal: *Skin integrity maintained  Outcome: Progressing Towards Goal  Goal: *Fluid volume balance  Outcome: Progressing Towards Goal  Goal: *Optimize nutritional status  Outcome: Progressing Towards Goal  Goal: *Anxiety reduced or absent  Outcome: Progressing Towards Goal  Goal: *Progressive mobility and function (eg: ADL's)  Outcome: Progressing Towards Goal     Problem: Patient Education: Go to Patient Education Activity  Goal: Patient/Family Education  Outcome: Progressing Towards Goal

## 2022-04-14 ENCOUNTER — APPOINTMENT (OUTPATIENT)
Dept: PHYSICAL THERAPY | Age: 62
End: 2022-04-14
Payer: MEDICARE

## 2022-04-14 VITALS
SYSTOLIC BLOOD PRESSURE: 124 MMHG | DIASTOLIC BLOOD PRESSURE: 84 MMHG | OXYGEN SATURATION: 96 % | TEMPERATURE: 98.4 F | HEART RATE: 79 BPM | RESPIRATION RATE: 17 BRPM | WEIGHT: 193 LBS | HEIGHT: 67 IN | BODY MASS INDEX: 30.29 KG/M2

## 2022-04-14 PROCEDURE — G0378 HOSPITAL OBSERVATION PER HR: HCPCS

## 2022-04-14 PROCEDURE — 74011250637 HC RX REV CODE- 250/637: Performed by: NEUROLOGICAL SURGERY

## 2022-04-14 PROCEDURE — 74011000250 HC RX REV CODE- 250: Performed by: NEUROLOGICAL SURGERY

## 2022-04-14 PROCEDURE — 97116 GAIT TRAINING THERAPY: CPT

## 2022-04-14 RX ORDER — HYDROCODONE BITARTRATE AND ACETAMINOPHEN 10; 325 MG/1; MG/1
1 TABLET ORAL
Qty: 20 TABLET | Refills: 0 | Status: SHIPPED | OUTPATIENT
Start: 2022-04-14 | End: 2022-04-19

## 2022-04-14 RX ADMIN — LISINOPRIL 10 MG: 5 TABLET ORAL at 08:23

## 2022-04-14 RX ADMIN — CHLORTHALIDONE 25 MG: 25 TABLET ORAL at 08:23

## 2022-04-14 RX ADMIN — SODIUM CHLORIDE, PRESERVATIVE FREE 10 ML: 5 INJECTION INTRAVENOUS at 05:07

## 2022-04-14 RX ADMIN — METOPROLOL SUCCINATE 100 MG: 100 TABLET, EXTENDED RELEASE ORAL at 08:23

## 2022-04-14 RX ADMIN — METFORMIN HYDROCHLORIDE 1000 MG: 500 TABLET ORAL at 08:23

## 2022-04-14 RX ADMIN — ALOGLIPTIN 12.5 MG: 12.5 TABLET, FILM COATED ORAL at 08:23

## 2022-04-14 RX ADMIN — PHENOL 1 SPRAY: 1.5 LIQUID ORAL at 08:32

## 2022-04-14 RX ADMIN — HYDROCODONE BITARTRATE AND ACETAMINOPHEN 1 TABLET: 10; 325 TABLET ORAL at 05:06

## 2022-04-14 RX ADMIN — Medication 1 AMPULE: at 08:23

## 2022-04-14 RX ADMIN — POTASSIUM CHLORIDE 10 MEQ: 750 TABLET, EXTENDED RELEASE ORAL at 08:23

## 2022-04-14 NOTE — DISCHARGE INSTRUCTIONS
Postoperative Home Instructions Anterior Cervical (neck) Fusion (ACDF)    · Showering: Do not soak in a hot tub, bath tub or swimming pool. Use only soap and water on the incision site. · Wound Care: A small to moderate amount of reddish drainage on the dressing is normal the first several days after surgery. Large amounts of clear, watery drainage is NOT normal and you should call our office immediately. · Signs of Infection: Extreme tenderness at the wound, excessive redness and/or swelling, yellowish-greenish drainage. Fever greater than 100.5. If you think you have a wound infection call immediately. · Swallowing: Don't be alarmed if there is a lump in your throat for several days after surgery-it is soft tissue swelling and it is normal. Begin by eating soft foods and drinking lots of fluids. If having trouble breathing call 911. If you cannot get liquids down at all call our office immediately. · Driving: Do not begin to drive until after your visit to our office for a wound check which is normally 7-10 days post op. When driving adjust your car mirrors to avoid twisting and turning in the car. Wear your collar if ordered. · Medications: You may NOT take anti-inflammatory medications. These include over-the counter ibuprofen products such as Motrin, Advil, Aleve and other related medications or prescription medications such as Ultram, Naproxen,Indocin, Celebrex etc. These medications slow down the bone healing. You may however, take Tylenol. The pain medication prescribed may be taken as needed. You may also be prescribed a muscle relaxer or be given samples in the office. Please take these as prescribed. You should continue taking the stool softener (Colace) twice a day, drink lots of water and eat high fiber foods to constipation (a common problem with pain medicine. · Deep Breathing Exercises: Continue to user your incentive spirometer to prevent risk of pneumonia.     · Smoking: YOU MAY NOT SMOKE. Smoking will interfere with your healing. If you smoke, you may end up having another surgery or more problems. · Activity: Avoid reaching over head, particularly to lift things, until told your fusion has healed. Don't lift anything heavier than 2-5 pounds. When lifting bend with your knees and keep your back straight. Sleeping is very difficult and sometimes requires frequent position changes. Sleep on a flat pillow or by elevating your torso where your head and back are aligned. Sleep on your side or back but do not sleep on your stomach. Some cannot get comfortable in a bed and have to sleep elevated in a reclining chair. Turn your head gradually from side to side, but avoid placing your chin to your chest or flexing your head backwards. Remove your DREW hose when consistently walking. Do step and inclines in moderation. · Cervical Collar: If your physician instructs you to wear one, wear your collar at all times. Wear your hard collar when riding in a car or doing activities outside your home. Make sure the supports your chin and remains snug at all times. Your physician may give you other instructions as well. If you notice the collar irritates your neck, you may line the collar with soft material such as a chamois cloth or soft t-shirt. You may also sprinkle plain cornstarch or baby powder on your neck to prevent excessive perspiration. You may have to wear you collar a total of 12 weeks or until your physician instructs you to remove. · Sexual Relations: You may resume sexual relations 2 weeks after your surgery. · Walking Program: After your suture visit it is very important that you begin a progressive walking program. Walking strengthens the spinal muscles and will help protect your disc and vertebrae. Increase your walking program up to 2 miles per day over the next four weeks. You should slowly begin slowly with 1/8 to 1/4 of a mile and add to this each day.  Please be very consistent with your walking program, as this is a vital part of your recovery. If at any time you feel you have over done it with the walking, just rest up the next day and start again the following day. Following each period of walking you should rest and place ice 20 minutes at a time to posterior cervical area. If desired, you may use the ice up to 3 times a day. · Symptoms After Surgery: Don't be alarmed if you still have some symptoms after surgery. The nerves often require time to heal after the pressure has been taken off. Be patient you should see improvement with time. Pain to shoulders and should blade area is very common with this surgery. You may experience numbness, tingling and some weakness to upper extremities. If these symptoms are bothersome please report to your physician. If you notice any weakness to your upper extremities you may be asked to stand an arms width from a wall and using your fingers walk them upward. You may be asked to do this several times a day and monitor the weakness for improvement over a short period of time. If you do not notice improvement in these symptoms you are asked to contact our office. It takes 3 months for a fusion to heal. You may experience some type of symptoms during this period of time. · Follow-Up: You will be given a follow-up appointment and an order for a cervical spine x-ray to be done prior to your return. Call our office with other questions or problems. It takes time to recover from surgery.

## 2022-04-14 NOTE — DISCHARGE SUMMARY
NEUROSURGERY DISCHARGE SUMMARY     Patient Name: Anibal Ca  Patient MRN: [id number]  Date of Admission: 4/12/2022  Date of Discharge: 4/14/2022   Length of Hospital Stay: 0    Pre-Procedure Diagnosis:   1. Cervical Disc Herniation C5-C6  2. Cervical Radiculopathy C6   3. Degenerative Disc Disease Cerivcal   4. Cervical Spinal Stenosis with Cord Compression C5-C6  5.  Neuroforaminal Stenosis of Cervical Spine C5-C6  Post-Procedure Diagnosis: SAME AS ABOVE     Admitting Attending: Saniya Tristan MD   Discharge Attending: Saniya Tristan MD      Past Medical History:   Diagnosis Date    Anemia     has been anemic in past - <11; does not take meds or infusions     Arrhythmia     [facility] 2018 - aspirin and metoprolol     Arthritis     osteo     Asthma     under control with meds and inhalers and nebs no SOB - mainly exercise induced and allergy induced but uses daily inhaler     Chronic pain     from neck and shoulder     COVID     hx of covid - 9/2021 in [facility] - was not hospitalized at that time     Depression     trazodone     Diabetes ([address]     oral meds; A1C 1/28/2022 = 6.2; avg fasting blood sugar=    GERD (gastroesophageal reflux disease)     omeprazole taken as needed     Headache     migraines takes maxalt as needed     Hypercholesterolemia     crestor at night     Hypertension     under control with meds     Lymphadenopathy     [place]il[place] [id number]    Nausea & vomiting     one time after surgery at [facility] - was in great deal of pain - after bladder stimulator placement     Neurogenic bladder     Psychotic disorder ([address]     bipolar; meds taken under control     PUD (peptic ulcer disease)     hiatal hernia; takes omeprazole as needed OTC     Sleep apnea     wears cpap nightly      Past Surgical History:   Procedure Laterality Date    HX COLONOSCOPY      HX CYST REMOVAL      under left arm - no lymph nodes removed     HX ENDOSCOPY      HX GYN      KOFI    HX HEENT      tonsils parotid    HX ORTHOPAEDIC Bilateral     bilat CTR     HX ROTATOR CUFF REPAIR Left     RCR     HX SHOULDER ARTHROSCOPY Right     shoulder scope to clean up the joint    HX UROLOGICAL      bladder stimulator-[place] x5     Allergies   Allergen Reactions    Crab Diarrhea    Adhesive Other (comments)     Blisters after long exposure     Ambien [Zolpidem] Other (comments)     Short term memory loss    Keflex [Cephalexin] Rash     itching    Morphine Other (comments)     Hallucinations, itching       Procedures Performed During Hospitalization:   1. C5-C6 COMPLETE ANTERIOR CERVICAL DISKECTOMY, OSTEOPHYTECTOMY FOR NERVE ROOT AND SPINAL CORD DECOMPRESSION WITH INTERBODY ARTHRODESIS (CPT: 90969)  2. ANTERIOR PLATE AND SCREW FIXATION FROM C5-C6 CPT: 84664)  3. C5-C6  INTERBODY FIXATION WITH TRITANIUM LORDOTIC GRAFTS/INTERBODY BIOMECHANICAL DEVICE WITH AUTOGRAFT, PROTEOS AND DBM (CPT: Z3790978, 71862)  4. INTRAOPERATIVE MICROSCOPE   5. INTRAOPERATIVE FLUOROSCOPY     Summary of Hospital Events:   Mihai Alva is a 63 y.o. female who was admitted to inpatient status in the hospital for routine post-operative convalescence. There she progressed as expected in the post-operative setting without complications. On the day of discharge she pain was well controlled, she was ambulating without difficulty, and she was amenable to being discharged home. Physical Exam:   Visit Vitals  /73 (BP 1 Location: Left upper arm, BP Patient Position: Sitting)   Pulse 88   Temp 97.9 °F (36.6 °C)   Resp 19   Ht 5' 6.5\" (1.689 m)   Wt 193 lb (87.5 kg)   SpO2 94%   Breastfeeding No   BMI 30.68 kg/m²     Patient is in no acute distress  Patient is awake, alert, and oriented.    Patient skin is warm and dry  No mid-line cervical, thoracic, or lumbar tenderness to palpation   Patient has 5/5 strength in the all muscle groups    Patient has 2+ DTR in the upper and lower extremities    Incision: is clean, dry, and intact with staples on the skin   Sensation is intact to light touch and pin-prick    Follow-Up: Wound check with Nurse Kaycee Henley at [address] [place] [address] and [facility]    Current Discharge Medication List        START taking these medications    Details   HYDROcodone-acetaminophen (Norco)  mg tablet Take 1 Tablet by mouth every six (6) hours as needed for Pain for up to 5 days. Max Daily Amount: 4 Tablets. Qty: 20 Tablet, Refills: 0  Start date: 4/14/2022, End date: 4/19/2022    Comments: Dr. Breanna DE LA ROSA#[id number]  Associated Diagnoses: Cervical spinal stenosis      phenazopyridine (Pyridium) 100 mg tablet Take 1 Tablet by mouth three (3) times daily as needed for Pain for up to 10 days. Qty: 30 Tablet, Refills: 0  Start date: 4/13/2022, End date: 4/23/2022      nitrofurantoin, macrocrystal-monohydrate, (Macrobid) 100 mg capsule Take 1 Capsule by mouth two (2) times a day for 5 days. Qty: 10 Capsule, Refills: 0  Start date: 4/13/2022, End date: 4/18/2022           CONTINUE these medications which have NOT CHANGED    Details   albuterol (PROVENTIL VENTOLIN) 2.5 mg /3 mL (0.083 %) nebu albuterol sulfate 2.5 mg/3 mL (0.083 %) solution for nebulization   USE 1 VIAL VIA NEBULIZER TWICE DAILY IF NEEDED FOR ASTHMA EXACERBATION      albuterol (Ventolin HFA) 90 mcg/actuation inhaler Take 2 Puffs by inhalation every four (4) hours as needed. aspirin delayed-release 81 mg tablet Take 81 mg by mouth. Only when traveling - 2 days before and 2 days after traveling - has not been on for months      cyanocobalamin (VITAMIN B12) 1,000 mcg/mL injection 1,000 mcg by SubCUTAneous route every twenty-eight (28) days. docusate sodium (COLACE) 100 mg capsule Take 100 mg by mouth three (3) times daily as needed. ergocalciferol (ERGOCALCIFEROL) 1,250 mcg (50,000 unit) capsule Take 50,000 Units by mouth Every Saturday.       fluticasone propion-salmeteroL (Advair Diskus) 250-50 mcg/dose diskus inhaler Advair Diskus 250 mcg-50 mcg/dose powder for inhalation   Inhale 2 puffs twice a day by inhalation route for 90 days. l-methylfolate (DEPLIN) 15 mg tablet Take 15 mg by mouth daily. LORazepam (ATIVAN) 0.5 mg tablet lorazepam 0.5 mg tablet   TAKE 1/2 TO 1 TABLET BY MOUTH TWICE DAILY AS NEEDED FOR ANXIETY      triamcinolone (Nasacort) 55 mcg nasal inhaler 2 Sprays by Both Nostrils route as needed. Omeprazole delayed release (PRILOSEC D/R) 20 mg tablet Take 20 mg by mouth daily. lisinopriL (PRINIVIL, ZESTRIL) 10 mg tablet Take 10 mg by mouth daily. Desvenlafaxine (Pristiq) 100 mg Tb24 Take  by mouth daily. metoprolol succinate (Toprol XL) 100 mg tablet Take  by mouth daily. chlorthalidone (HYGROTON) 25 mg tablet Take 25 mg by mouth daily. potassium chloride SA (MICRO-K) 10 mEq capsule Take 10 mEq by mouth daily. SITagliptin-metFORMIN (Janumet) 50-1,000 mg per tablet Take 1 Tablet by mouth daily (with breakfast). traZODone (DESYREL) 100 mg tablet Take 100 mg by mouth nightly. rosuvastatin (Crestor) 20 mg tablet Take 20 mg by mouth nightly. Cetirizine (ZyrTEC) 10 mg cap Take  by mouth daily. tiZANidine (Zanaflex) 4 mg tablet Take 4 mg by mouth three (3) times daily as needed for Muscle Spasm(s). Takes qhs usually takes at night      rizatriptan (Maxalt-MLT) 5 mg rapid dissolve tablet Take 5 mg by mouth every two (2) hours as needed. lidocaine (Lidoderm) 5 % Lidoderm 5 % topical patch   APPLY 1 PATCH BY TOPICAL ROUTE ONCE DAILY (MAY WEAR UP TO 12HOURS.)      polyethylene glycol (MIRALAX) 17 gram/dose powder Take  by mouth as needed.            STOP taking these medications       HYDROcodone-acetaminophen (NORCO) 5-325 mg per tablet Comments:   Reason for Stopping:         traMADoL 100 mg tab Comments:   Reason for Stopping:         diclofenac (VOLTAREN) 1 % gel Comments:   Reason for Stopping:               The patient has been advised to call or seek medical attention should they develop any new weakness, numbness, tingling, fevers, drainage from incision, redness of the wound, or any other concerns. Terrance Moulton.  [facility]

## 2022-04-14 NOTE — PROGRESS NOTES
Problem: Falls - Risk of  Goal: *Absence of Falls  Description: Document Shannon Mcburney Fall Risk and appropriate interventions in the flowsheet.   Outcome: Progressing Towards Goal  Note: Fall Risk Interventions:  Mobility Interventions: Bed/chair exit alarm         Medication Interventions: Bed/chair exit alarm,Patient to call before getting OOB,Teach patient to arise slowly                   Problem: Patient Education: Go to Patient Education Activity  Goal: Patient/Family Education  Outcome: Progressing Towards Goal     Problem: General Medical Care Plan  Goal: *Vital signs within specified parameters  Outcome: Progressing Towards Goal  Goal: *Labs within defined limits  Outcome: Progressing Towards Goal  Goal: *Absence of infection signs and symptoms  Outcome: Progressing Towards Goal  Goal: *Optimal pain control at patient's stated goal  Outcome: Progressing Towards Goal  Goal: *Skin integrity maintained  Outcome: Progressing Towards Goal  Goal: *Fluid volume balance  Outcome: Progressing Towards Goal  Goal: *Optimize nutritional status  Outcome: Progressing Towards Goal  Goal: *Anxiety reduced or absent  Outcome: Progressing Towards Goal  Goal: *Progressive mobility and function (eg: ADL's)  Outcome: Progressing Towards Goal     Problem: Patient Education: Go to Patient Education Activity  Goal: Patient/Family Education  Outcome: Progressing Towards Goal     Problem: Patient Education: Go to Patient Education Activity  Goal: Patient/Family Education  Outcome: Progressing Towards Goal     Problem: Patient Education: Go to Patient Education Activity  Goal: Patient/Family Education  Outcome: Progressing Towards Goal

## 2022-04-14 NOTE — PROGRESS NOTES
ACUTE PHYSICAL THERAPY GOALS:  (Developed with and agreed upon by patient and/or caregiver.)  (1.) Allan Guerrero  will move from supine to sit and sit to supine  with INDEPENDENCE within 7 treatment day(s). (2.) Allan Geurrero will transfer from bed to chair and chair to bed with MODIFIED INDEPENDENCE using the least restrictive device within 7 treatment day(s). (3.) Allan Guerrero will ambulate with MODIFIED INDEPENDENCE for 500 feet with the least restrictive device within 7 treatment day(s). (4.) Allan Guerrero will perform standing static and dynamic balance activities x 20 minutes with SUPERVISION to improve safety within 7 treatment day(s). (5.) Allan Guerrero will ascend and descend 5 stairs using 1 hand rail(s) with SUPERVISION to improve functional mobility and safety within 7 treatment day(s). (6.) Allan Guerrero will perform bilateral lower extremity exercises x 20 min for HEP with INDEPENDENCE to improve strength, endurance, and functional mobility within 7 treatment day(s).      PHYSICAL THERAPY: Daily Note and AM Treatment Day # 1    Allan Guerrero is a 63 y.o. female   PRIMARY DIAGNOSIS: <principal problem not specified>  Cervical spinal stenosis [M48.02]  Degeneration, intervertebral disc, cervical [M50.30]  Cervical radiculitis [M54.12]  Neural foraminal stenosis of cervical spine [M48.02]  DDD (degenerative disc disease), cervical [M50.30]  Cervical radiculopathy [M54.12]  Neuroforaminal stenosis of cervical spine [M48.02]  Procedure(s) (LRB):  C5-C6 ANTERIOR CERVICAL DISCECTOMY WITH FUSION (N/A)  2 Days Post-Op    ASSESSMENT:     REHAB RECOMMENDATIONS: CURRENT LEVEL OF FUNCTION:  (Most Recently Demonstrated)   Recommendation to date pending progress:  [address] [place] [address]  Equipment:    3 in 1 Bedside Commode Bed Mobility:   Supervision  Sit to Stand:   Supervision  Transfers:   Not tested  Gait/Mobility:   Supervision     ASSESSMENT:  Ms. Rochelle Araujo was sitting up in chair on contact and agreeable to therapy. She was able to ambulate 500 ft with SPC and ascend/descend 10 steps with supervision. Practiced bed mobility by log roll technique with CGA. Reviewed spinal precautions, able to recite 3/3 and able to maintain them throughout mobility with minimal cueing. Good progress, will continue to follow. SUBJECTIVE:   Ms. Yarely Leavitt states \"I'm going home this afternoon\".     SOCIAL HISTORY/ LIVING ENVIRONMENT: see eval  Support Systems: Spouse/Significant Other  OBJECTIVE:     PAIN: VITAL SIGNS: LINES/DRAINS:   Pre Treatment:    Post Treatment: 0   None  RA     MOBILITY: I Mod I S SBA CGA Min Mod Max Total  NT x2 Comments:   Bed Mobility    Rolling [] [] [x] [] [] [] [] [] [] [] []    Supine to Sit [] [] [x] [] [] [] [] [] [] [] []    Scooting [] [] [x] [] [] [] [] [] [] [] []    Sit to Supine [] [] [x] [] [] [] [] [] [] [] []    Transfers    Sit to Stand [] [] [x] [] [] [] [] [] [] [] []    Bed to Chair [] [] [] [] [] [] [] [] [] [] [x]    Stand to Sit [] [] [x] [] [] [] [] [] [] [] []    I=Independent, Mod I=Modified Independent, S=Supervision, SBA=Standby Assistance, CGA=Contact Guard Assistance,   Min=Minimal Assistance, Mod=Moderate Assistance, Max=Maximal Assistance, Total=Total Assistance, NT=Not Tested    BALANCE: Good Fair+ Fair Fair- Poor NT Comments   Sitting Static [x] [] [] [] [] []    Sitting Dynamic [x] [] [] [] [] []              Standing Static [] [x] [] [] [] []    Standing Dynamic [] [x] [] [] [] []      GAIT: I Mod I S SBA CGA Min Mod Max Total  NT x2 Comments:   Level of Assistance [] [] [] [x] [] [] [] [] [] [] []    Distance 500 ft    DME SPC    Gait Quality Decreased gait speed, short steps    Weightbearing  Status N/A     I=Independent, Mod I=Modified Independent, S=Supervision, SBA=Standby Assistance, CGA=Contact Guard Assistance,   Min=Minimal Assistance, Mod=Moderate Assistance, Max=Maximal Assistance, Total=Total Assistance, NT=Not Tested    PLAN:   FREQUENCY/DURATION: PT Plan of Care: BID for duration of hospital stay or until stated goals are met, whichever comes first.  TREATMENT:     TREATMENT:   (     )  Therapeutic Activity (23 Minutes): Therapeutic activity included Rolling, Supine to Sit, Sit to Supine, Scooting, Ambulation on level ground, Sitting balance , Standing balance and ascend/descended 10 steps to improve functional Mobility, Strength and Activity tolerance.     TREATMENT GRID:  N/A    AFTER TREATMENT POSITION/PRECAUTIONS:  Chair and RN notified    INTERDISCIPLINARY COLLABORATION:  RN/PCT    TOTAL TREATMENT DURATION:  PT Patient Time In/Time Out  Time In: 1032  Time Out: 1459 [facility] [employer] [facility]

## 2022-04-18 ENCOUNTER — APPOINTMENT (OUTPATIENT)
Dept: PHYSICAL THERAPY | Age: 62
End: 2022-04-18
Payer: MEDICARE

## 2022-04-21 ENCOUNTER — APPOINTMENT (OUTPATIENT)
Dept: PHYSICAL THERAPY | Age: 62
End: 2022-04-21
Payer: MEDICARE

## 2022-04-26 ENCOUNTER — APPOINTMENT (OUTPATIENT)
Dept: PHYSICAL THERAPY | Age: 62
End: 2022-04-26
Payer: MEDICARE

## 2022-04-28 ENCOUNTER — APPOINTMENT (OUTPATIENT)
Dept: PHYSICAL THERAPY | Age: 62
End: 2022-04-28
Payer: MEDICARE

## 2022-05-03 ENCOUNTER — APPOINTMENT (OUTPATIENT)
Dept: PHYSICAL THERAPY | Age: 62
End: 2022-05-03

## 2022-05-05 ENCOUNTER — APPOINTMENT (OUTPATIENT)
Dept: PHYSICAL THERAPY | Age: 62
End: 2022-05-05

## 2022-05-09 ENCOUNTER — HOSPITAL ENCOUNTER (OUTPATIENT)
Dept: GENERAL RADIOLOGY | Age: 62
Discharge: HOME | End: 2022-05-09
Payer: MEDICARE

## 2022-05-09 DIAGNOSIS — M48.02 CERVICAL SPINAL STENOSIS: ICD-10-CM

## 2022-05-09 DIAGNOSIS — M54.12 CERVICAL RADICULOPATHY: ICD-10-CM

## 2022-05-09 DIAGNOSIS — M48.02 NEUROFORAMINAL STENOSIS OF CERVICAL SPINE: ICD-10-CM

## 2022-05-09 DIAGNOSIS — M50.30 DEGENERATIVE DISC DISEASE, CERVICAL: ICD-10-CM

## 2022-05-09 PROCEDURE — 72040 X-RAY EXAM NECK SPINE 2-3 VW: CPT

## 2022-05-10 ENCOUNTER — APPOINTMENT (OUTPATIENT)
Dept: PHYSICAL THERAPY | Age: 62
End: 2022-05-10

## 2022-05-12 ENCOUNTER — APPOINTMENT (OUTPATIENT)
Dept: PHYSICAL THERAPY | Age: 62
End: 2022-05-12

## 2022-05-12 DIAGNOSIS — M50.30 DDD (DEGENERATIVE DISC DISEASE), CERVICAL: ICD-10-CM

## 2022-05-12 DIAGNOSIS — M54.12 CERVICAL RADICULOPATHY: ICD-10-CM

## 2022-05-12 DIAGNOSIS — M48.02 CERVICAL SPINAL STENOSIS: Primary | ICD-10-CM

## 2022-07-03 RX ORDER — LISINOPRIL 10 MG/1
TABLET ORAL
COMMUNITY

## 2022-07-03 RX ORDER — DESVENLAFAXINE 50 MG/1
TABLET, EXTENDED RELEASE ORAL
COMMUNITY

## 2022-07-03 RX ORDER — CHLORTHALIDONE 25 MG/1
TABLET ORAL
COMMUNITY

## 2022-07-03 RX ORDER — SIMVASTATIN 10 MG
TABLET ORAL
COMMUNITY
End: 2022-10-10

## 2022-07-03 RX ORDER — ROSUVASTATIN CALCIUM 40 MG/1
TABLET, COATED ORAL
COMMUNITY

## 2022-07-03 RX ORDER — ONDANSETRON 4 MG/1
TABLET, FILM COATED ORAL
COMMUNITY
End: 2022-10-14

## 2022-07-03 RX ORDER — ESTRADIOL 1 MG/1
TABLET ORAL
COMMUNITY
End: 2022-10-10

## 2022-07-03 RX ORDER — PROGESTERONE 100 MG/1
CAPSULE ORAL
COMMUNITY
End: 2022-10-10

## 2022-07-03 RX ORDER — TIZANIDINE 4 MG/1
TABLET ORAL
COMMUNITY

## 2022-07-08 RX ORDER — TRAZODONE HYDROCHLORIDE 100 MG/1
TABLET ORAL
COMMUNITY

## 2022-07-11 ENCOUNTER — HOSPITAL ENCOUNTER (OUTPATIENT)
Dept: GENERAL RADIOLOGY | Age: 62
Discharge: HOME | End: 2022-07-14
Payer: MEDICARE

## 2022-07-11 DIAGNOSIS — M48.02 CERVICAL SPINAL STENOSIS: ICD-10-CM

## 2022-07-11 DIAGNOSIS — M50.30 DDD (DEGENERATIVE DISC DISEASE), CERVICAL: ICD-10-CM

## 2022-07-11 DIAGNOSIS — M54.12 CERVICAL RADICULOPATHY: ICD-10-CM

## 2022-07-11 PROCEDURE — 72040 X-RAY EXAM NECK SPINE 2-3 VW: CPT

## 2022-07-15 ENCOUNTER — TELEPHONE (OUTPATIENT)
Dept: NEUROSURGERY | Age: 62
End: 2022-07-15

## 2022-07-15 DIAGNOSIS — M54.12 CERVICAL RADICULOPATHY: ICD-10-CM

## 2022-07-15 DIAGNOSIS — M50.30 DDD (DEGENERATIVE DISC DISEASE), CERVICAL: ICD-10-CM

## 2022-07-15 DIAGNOSIS — M48.02 CERVICAL SPINAL STENOSIS: Primary | ICD-10-CM

## 2022-07-15 NOTE — TELEPHONE ENCOUNTER
----- Message from [facility] sent at 7/15/2022 12:35 PM EDT -----  Will you please place an C-Spine Xray order in chart to be taken the day of her next appt September 14 th? Thank You and I will notify the patient.  ----- Message -----  From: Stephy Jenkins MD  Sent: 7/15/2022  11:46 AM EDT  To: José Luis Deleon let's get a follow-up x-ray prior to that next visit.   ----- Message -----  From: [facility]  Sent: 7/15/2022  11:41 AM EDT  To: Stephy Jenkins MD, Otto Fox MA    Patient called to reschedule her appt. From Monday 07/11/22 as we had to cancel due to the [facility] [place] outage. She did go for her Xray on Monday 07/12/22 as scheduled. However, she is now out of the country for the next couple of months and can not come back in until September 14th to follow up. Will you need an additional Xray ordered for that appointment?   Thanks,  LA

## 2022-10-10 ENCOUNTER — OFFICE VISIT (OUTPATIENT)
Dept: NEUROSURGERY | Age: 62
End: 2022-10-10
Payer: MEDICARE

## 2022-10-10 ENCOUNTER — HOSPITAL ENCOUNTER (OUTPATIENT)
Dept: GENERAL RADIOLOGY | Age: 62
Discharge: HOME | End: 2022-10-13

## 2022-10-10 VITALS
HEIGHT: 67 IN | OXYGEN SATURATION: 96 % | WEIGHT: 201 LBS | BODY MASS INDEX: 31.55 KG/M2 | DIASTOLIC BLOOD PRESSURE: 60 MMHG | TEMPERATURE: 98.4 F | SYSTOLIC BLOOD PRESSURE: 93 MMHG | HEART RATE: 81 BPM

## 2022-10-10 DIAGNOSIS — Z98.890 S/P CERVICAL DISCECTOMY: Primary | ICD-10-CM

## 2022-10-10 DIAGNOSIS — M48.02 CERVICAL SPINAL STENOSIS: ICD-10-CM

## 2022-10-10 DIAGNOSIS — M50.30 DDD (DEGENERATIVE DISC DISEASE), CERVICAL: ICD-10-CM

## 2022-10-10 DIAGNOSIS — Z98.1 S/P CERVICAL SPINAL FUSION: ICD-10-CM

## 2022-10-10 DIAGNOSIS — Z09 SURGICAL FOLLOW-UP CARE: ICD-10-CM

## 2022-10-10 DIAGNOSIS — M54.12 CERVICAL RADICULOPATHY: ICD-10-CM

## 2022-10-10 PROCEDURE — 99213 OFFICE O/P EST LOW 20 MIN: CPT | Performed by: NEUROLOGICAL SURGERY

## 2022-10-10 PROCEDURE — 72040 X-RAY EXAM NECK SPINE 2-3 VW: CPT

## 2022-10-10 RX ORDER — PREGABALIN 75 MG/1
75 CAPSULE ORAL 2 TIMES DAILY
COMMUNITY

## 2022-10-10 NOTE — PROGRESS NOTES
[facility] NOTE:   History of Present Illness:    CC: Postoperative follow-up    Mikel Nation is a 63 y.o. female who presents today for postoperative follow-up. She underwent a C5-C6 anterior cervical discectomy and fusion performed for treatment of a C6 cervical radiculopathy and release of cervical spinal stenosis with cord compression performed on 4/12/2022. The patient has x-ray AP and lateral imaging upright of the cervical spine that demonstrates a C5-C6 anterior cervical discectomy and fusion. In comparison to prior x-ray imaging performed on 5/9/2022 there is no evidence of significant interval change. This is consistent with a stable cervical fusion construct. She has been out of the country in [facility] helping care for her  and tending to their home in [place].  [facility] [place] [place] and is here to resume her postoperative follow-up care. She not done any formal physical therapy that she underwent surgery. She had some crawling sensations in her legs and was started on Lyrica 75 mg and her primary care provider recently told her to increase it to 75 mg twice daily.     Past Medical History:   Diagnosis Date    Anemia     has been anemic in past - <11; does not take meds or infusions     Arrhythmia     [facility] 2018 - aspirin and metoprolol     Arthritis     osteo     Asthma     under control with meds and inhalers and nebs no SOB - mainly exercise induced and allergy induced but uses daily inhaler     Chronic pain     from neck and shoulder     COVID     hx of covid - 9/2021 in [facility] - was not hospitalized at that time     Depression     trazodone     Diabetes ([address]     oral meds; A1C 1/28/2022 = 6.2; avg fasting blood sugar=    GERD (gastroesophageal reflux disease)     omeprazole taken as needed     Headache     migraines takes maxalt as needed     Hypercholesterolemia     crestor at night Hypertension     under control with meds     Lymphadenopathy     Bilat [id number]    Nausea & vomiting     one time after surgery at [facility] - was in great deal of pain - after bladder stimulator placement     Neurogenic bladder     Psychotic disorder ([address]     bipolar; meds taken under control     PUD (peptic ulcer disease)     hiatal hernia; takes omeprazole as needed OTC     Sleep apnea     wears cpap nightly       Past Surgical History:   Procedure Laterality Date    CERVICAL FUSION  04/12/2022    ACDF C5-6 DR. Haines    COLONOSCOPY      CYST REMOVAL      under left arm - no lymph nodes removed     GYN      SHAYLEE    HEENT      tonsils parotid    ORTHOPEDIC SURGERY Bilateral     bilat CTR     ROTATOR CUFF REPAIR Left     RCR     SHOULDER ARTHROSCOPY Right     shoulder scope to clean up the joint    UPPER GASTROINTESTINAL ENDOSCOPY      UROLOGICAL SURGERY      bladder stimulator-Hoffmeister x5     Allergies   Allergen Reactions    Latex      Other reaction(s): Unknown    Crab Extract Allergy Skin Test Diarrhea    Cephalexin      Other reaction(s): itching    Morphine Other (See Comments)     Hallucinations, itching    Morphine      Other reaction(s): Unknown    Zolpidem Other (See Comments)     Short term memory loss    Cephalexin Rash     itching      Family History   Problem Relation Age of Onset    Breast Cancer Neg Hx     Parkinson's Disease Father     Heart Disease Father     Diabetes Father     Hypertension Father       Social History     Socioeconomic History    Marital status:      Spouse name: Not on file    Number of children: Not on file    Years of education: Not on file    Highest education level: Not on file   Occupational History    Not on file   Tobacco Use    Smoking status: Former    Smokeless tobacco: Never    Tobacco comments:     Quit smoking: college days only smoked 9 months   Substance and Sexual Activity    Alcohol use:  Yes     Alcohol/week: 1.0 standard drink    Drug use: Never    Sexual activity: Not on file   Other Topics Concern    Not on file   Social History Narrative    Not on file     Social Determinants of Health     Financial Resource Strain: Not on file   Food Insecurity: Not on file   Transportation Needs: Not on file   Physical Activity: Not on file   Stress: Not on file   Social Connections: Not on file   Intimate Partner Violence: Not on file   Housing Stability: Not on file     Current Outpatient Medications   Medication Sig Dispense Refill    pregabalin (LYRICA) 75 MG capsule Take 75 mg by mouth 2 times daily.       traZODone (DESYREL) 100 MG tablet 1-2 tablet at bedtime Orally at bedtime      chlorthalidone (HYGROTON) 25 MG tablet 1 tablet in the morning with food Orally Once a day for 30 day(s)      desvenlafaxine succinate (PRISTIQ) 50 MG TB24 extended release tablet 2 tablet Orally Once a day      fluticasone-salmeterol (ADVAIR HFA) 45-21 MCG/ACT inhaler 2 puffs Inhalation Twice a day      lisinopril (PRINIVIL;ZESTRIL) 10 MG tablet 1 tablet Orally Once a day for 30 day(s)      ondansetron (ZOFRAN) 4 MG tablet 1 tablet Orally prn      rosuvastatin (CRESTOR) 40 MG tablet 1 tablet Orally Once a day for 30 day(s)      tiZANidine (ZANAFLEX) 4 MG tablet 1 tablet as needed Orally Three times a day      albuterol (PROVENTIL) (2.5 MG/3ML) 0.083% nebulizer solution Albuterol sulfate 2.5 mg/3 mL (0.083 %) solution for nebulization USE 1 VIAL VIA NEBULIZER TWICE DAILY IF NEEDED FOR ASTHMA EXACERBATION      albuterol sulfate  (90 Base) MCG/ACT inhaler Inhale 2 puffs into the lungs every 4 hours as needed      aspirin 81 MG EC tablet Take 81 mg by mouth      Cetirizine HCl (ZYRTEC ALLERGY) 10 MG CAPS Take by mouth daily      chlorthalidone (HYGROTON) 25 MG tablet Take 25 mg by mouth daily      cyanocobalamin 1000 MCG/ML injection Inject 1,000 mcg into the skin      desvenlafaxine succinate (PRISTIQ) 100 MG TB24 extended release tablet Take by mouth daily      docusate (COLACE, DULCOLAX) 100 MG CAPS Take 100 mg by mouth 3 times daily as needed      ergocalciferol (ERGOCALCIFEROL) 1.25 MG (71133 UT) capsule Take 50,000 Units by mouth      fluticasone-salmeterol (ADVAIR) 250-50 MCG/DOSE AEPB Advair Diskus 250 mcg-50 mcg/dose powder for inhalation Inhale 2 puffs twice a day by inhalation route for 90 days. lidocaine (LIDODERM) 5 % Lidoderm 5 % topical patch APPLY 1 PATCH BY TOPICAL ROUTE ONCE DAILY (MAY WEAR UP TO 12HOURS.)      lisinopril (PRINIVIL;ZESTRIL) 10 MG tablet Take 10 mg by mouth daily      metoprolol succinate (TOPROL XL) 100 MG extended release tablet Take by mouth daily      omeprazole (PRILOSEC OTC) 20 MG tablet Take 20 mg by mouth daily      polyethylene glycol (GLYCOLAX) 17 GM/SCOOP powder Take by mouth as needed      potassium chloride (MICRO-K) 10 MEQ extended release capsule Take 10 mEq by mouth daily      rizatriptan (MAXALT-MLT) 5 MG disintegrating tablet Take 5 mg by mouth      rosuvastatin (CRESTOR) 20 MG tablet Take 20 mg by mouth      sitaGLIPtan-metFORMIN (JANUMET)  MG per tablet Take 1 tablet by mouth daily (with breakfast)      tiZANidine (ZANAFLEX) 4 MG tablet Take 4 mg by mouth 3 times daily as needed      traZODone (DESYREL) 100 MG tablet Take 100 mg by mouth      triamcinolone (NASACORT) 55 MCG/ACT nasal inhaler 2 sprays by Nasal route as needed       No current facility-administered medications for this visit.      Patient Active Problem List   Diagnosis    Cervical spinal stenosis    DDD (degenerative disc disease), cervical    Cervical radiculopathy    Neuroforaminal stenosis of cervical spine        Review of Systems    Physical Exam:  BP 93/60   Pulse 81   Temp 98.4 °F (36.9 °C) (Temporal)   Ht 5' 6.5\" (1.689 m)   Wt 201 lb (91.2 kg)   SpO2 96%   BMI 31.96 kg/m²   Pain Score:   6  Head normocephalic and atraumatic  Mood and affect appropriate  General: No acute distress  CV: Regular rate  Resp: No increased work of breathing  Skin: warm and dry  Awake, alert, and oriented   Speech Fluent  Eyes open spontaneously   Face symmetric and tongue midline on protrusion  Sternocleidomastoid and trapezius strength 5/5  No mid-line cervical, thoracic, or lumbar tenderness to palpation   Patient with strength exam as follows:   Upper Extremities: Right Left      Deltoid  5 5    Biceps  5 5    Triceps  5 5      5 5     Lower Extremities:      Hip Flex 5 5    Quads  5 5    Hamstrings 5 5    Dorsiflex 5 5    Plantarflex 5 5    EHL  5 5  Sensation intact to light touch and pin-prick   DTR 2+  Gait: Ambulates with the assistance of a cane with slightly unsteady gait    Assessment & Plan:  Loc Chavez is a 63 y.o. female who presents today for postoperative follow-up. She underwent a C5-C6 anterior cervical discectomy and fusion performed for treatment of a C6 cervical radiculopathy and release of cervical spinal stenosis with cord compression performed on 4/12/2022. I have independently reviewed and interpreted the patient's x-ray AP and lateral imaging upright of the cervical spine that demonstrates a C5-C6 anterior cervical discectomy and fusion. In comparison to prior x-ray imaging performed on 5/9/2022 there is no evidence of significant interval change. This is consistent with a stable cervical fusion construct. At this point I will see her back in 3 months time with repeat AP and lateral x-ray imaging of her cervical spine. ICD-10-CM    1. S/P cervical discectomy  Z98.890       2. S/P cervical spinal fusion  Z98.1       3. Surgical follow-up care  Z09           Neil Richmond, [address] [place] [address]     Notes are transcribed with [address] [place] [address], a medical voice recording dictation service, and may contain minor errors.

## 2022-10-12 ENCOUNTER — HOSPITAL ENCOUNTER (OUTPATIENT)
Dept: PHYSICAL THERAPY | Age: 62
Setting detail: RECURRING SERIES
Discharge: HOME | End: 2022-10-15
Payer: MEDICARE

## 2022-10-12 PROCEDURE — 97110 THERAPEUTIC EXERCISES: CPT

## 2022-10-12 PROCEDURE — 97161 PT EVAL LOW COMPLEX 20 MIN: CPT

## 2022-10-12 ASSESSMENT — PAIN SCALES - GENERAL: PAINLEVEL_OUTOF10: 8

## 2022-10-12 NOTE — PROGRESS NOTES
Patrice Munson  : 1960  Primary: Medicare Part A And B  Secondary:  [address] [facility] [address]  Phone: [phone]  Fax: [phone] Plan Frequency: 2 visits per week for 8 weeks    Plan of Care/Certification Expiration Date: 22 (Start of [address] 10/12/2022)      PT Visit Info:  No data recorded   Visit Count:  1   OUTPATIENT PHYSICAL THERAPY:OP NOTE TYPE: Treatment Note 10/12/2022       Episode  }Appt Desk             Treatment Diagnosis:  S/p cervical discectomy (Z98.89)  S/p cervical spinal fusion (Z98.1)  Surgical follow-up care (Z09)  Neck pain (M54.2)  Medical/Referring Diagnosis:  S/P cervical discectomy [Z98.890]  S/P cervical spinal fusion [Z98.1]  Surgical follow-up care [Z09]  Referring Physician:  Sagar Phillips MD MD Orders:  PT Eval and Treat   Date of Onset:  Onset Date: 22 (s/p C5-6 cervical fusion)     Allergies:   Latex, Crab extract allergy skin test, Cephalexin, Morphine, Morphine, Zolpidem, and Cephalexin  Restrictions/Precautions:  Restrictions/Precautions: Fall Risk  No data recorded   Interventions Planned (Treatment may consist of any combination of the following):    Current Treatment Recommendations: Strengthening; ROM; Balance training; Functional mobility training; Transfer training; ADL/Self-care training; Endurance training; Gait training; Stair training; Neuromuscular re-education; Manual Therapy - Soft Tissue Mobilization; Manual Therapy - Joint Manipulation; Pain management; Return to work related activity; Home exercise program; Safety education & training; Patient/Caregiver education & training; Equipment evaluation, education, & procurement; Modalities; Integrated dry needling; Therapeutic activities     Subjective Comments:  Pt reports increased soreness today from helping her  work on car yesterday.   Initial:}    8/10Post Session:       6/10  Medications Last Reviewed: 10/12/2022  Updated Objective Findings:  See evaluation note from today  Treatment   THERAPEUTIC EXERCISE: (38 minutes):    Exercises per grid below to improve mobility, strength, balance, and coordination. Required moderate visual, verbal, manual, and tactile cues to promote proper body alignment, promote proper body poster and proper body mechanics. Progressed resistance, range, repetitions and complexity of movement as indicated. Date:  10/12/2022 Date:   Date:     Activity/Exercise Parameters Parameters Parameters   Cervical AROM Supine cervical rotation, x20 each     Chin tuck Supine, 10x5 sec     Scap retract Seated,x20     Supine pec stretch [id number]     Cervical isometrics Not today                         Time spent with patient reviewing proper muscle recruitment and technique with exercises. MANUAL THERAPY: (0 minutes):   Joint mobilization and Soft tissue mobilization was utilized and necessary because of the patient's restricted joint motion, painful spasm, loss of articular motion, and restricted motion of soft tissue. - None toay. MODALITIES: (0 minutes):        None today. HEP: As above; handouts given to patient for all exercises. Treatment/Session Summary:    Treatment Assessment:  Good tolerance to exercise program observed today. Communication/Consultation:   HEP handout provided. Equipment provided today:  None  Recommendations/Intent for next treatment session: Next visit will focus on pain control, improve cervical spine mobility and strength, improve posture, progress balance/gait/functional mobility. Total Treatment Billable Duration:  53 minutes, 15 min eval, 38 min therex.   Time In: 0902  Time Out: 1003    Norma John PT       Charge Capture  }Post Session Pain  PT Visit Info  [facility]  MD Guidelines  Scanned Media  Benefits  MyChart    Future Appointments   Date Time Provider Rosaura Montelongo   10/14/2022  7:00 AM Norma John PT [facility] 10/17/2022  [facility]   10/18/2022  9:00 AM Ayana Craft, PT [facility]   10/21/2022  9:00 AM Lynne Sherman, PTA [facility]   10/25/2022  9:00 AM Lynne Sherman, PTA [facility]   10/28/2022  9:00 AM Lynne Sherman, PTA [facility]   11/2/2022 10:00 AM Ayana Craft, PT [facility]   11/4/2022  9:00 AM Lynne Sherman, PTA [facility]   11/8/2022  9:00 AM Lynne Sherman, PTA [facility]   11/11/2022  9:00 AM Ayana Craft, PT [facility]   11/15/2022  9:00 AM Ayana Craft, PT [facility]   11/18/2022  9:00 AM Lynne Sherman, PTA [facility]   11/22/2022  9:00 AM Lynne Sherman, PTA [facility]   11/29/2022  9:00 AM Ayana Craft, PT [facility]   12/2/2022  9:00 AM Ayana Craft, PT [facility]   1/11/2023  1:15 PM Manjinder Norris MD [facility] AMB

## 2022-10-12 NOTE — THERAPY EVALUATION
Mariajosevasquez Munson  : 1960  Primary:   Secondary:  [address] @ [address] [facility] [address]  Phone: [phone]  Fax: [phone] Plan Frequency: 2 visits per week for 8 weeks  Plan of Care/Certification Expiration Date: 22 (Start of [address] 10/12/2022)    PT Visit Info:         Visit Count:  1    OUTPATIENT PHYSICAL THERAPY:OP NOTE TYPE: Initial Assessment 10/12/2022               Episode  Appt Desk         Treatment Diagnosis:  S/p cervical discectomy (Z98.89)  S/p cervical spinal fusion (Z98.1)  Surgical follow-up care (Z09)  Neck pain (M54.2)  Medical/Referring Diagnosis:  S/P cervical discectomy [Z98.890]  S/P cervical spinal fusion [Z98.1]  Surgical follow-up care [Z09]  Referring Physician:  Jami Encarnacion MD MD Orders:  PT Eval and Treat  Return MD Appt:  2023  Date of Onset:  Onset Date: 22 (s/p C5-6 cervical fusion)   Allergies:  Latex, Crab extract allergy skin test, Cephalexin, Morphine, Morphine, Zolpidem, and Cephalexin  Restrictions/Precautions:    Restrictions/Precautions: Fall Risk      Medications Last Reviewed:  10/12/2022     SUBJECTIVE   History of Injury/Illness (Reason for Referral):  Mrs. Matt Munson is a 64 y/o female with complaints of neck pain/stiffness and balance/gait difficulties following C5-6 cervical fusion surgery on 2022. Pt reports no complications since surgery however has noticed some continued muscle weakness in BUEs/BLEs as well as intermittent tingling in RUE and LLE. Pt reports some difficulty with balance and gait and reports fall 4 days ago without injury when getting out of her shower. Pt ambulating mostly with SPC at this time however she does have rolator walker if needed for longer distances. Pt denies dizziness/headache/vision changes. Pt reports bladder dysfunction unrelated to current episode of care that is being treated with spine stimulator.  Pt referred by MD for PT eval and treat. Pt will benefit from skilled PT treatment to address deficits in strength, AROM, balance, gait and functional mobility in order to return to prior level of function and improve quality of life. Patient Stated Goal(s):  \"Reduce pain, improve balance and walking tolerance. \"  Initial:     8/10 Post Session:     6/10  Past Medical History/Comorbidities:   Ms. Robbie Puga  has a past medical history of Anemia, Arrhythmia, Arthritis, Asthma, Chronic pain, COVID, Depression, Diabetes ([address] GERD (gastroesophageal reflux disease), Headache, Hypercholesterolemia, Hypertension, Lymphadenopathy, Nausea & vomiting, Neurogenic bladder, Psychotic disorder ([address] PUD (peptic ulcer disease), and Sleep apnea. Ms. Robbie Puga  has a past surgical history that includes Colonoscopy; Urological Surgery; gyn; heent; Rotator cuff repair (Left); cyst removal; Shoulder arthroscopy (Right); Upper gastrointestinal endoscopy; cervical fusion (04/12/2022); and orthopedic surgery (Bilateral). Social History/Living Environment:   Lives With: Spouse  Type of Home: House  Home Layout: Two level     Prior Level of Function/Work/Activity:   Prior level of function: Independent           Learning:   Does the patient/guardian have any barriers to learning?: No barriers  Will there be a co-learner?: No  What is the preferred language of the patient/guardian?: English  Is an  required?: No  How does the patient/guardian prefer to learn new concepts?: Listening; Reading; Demonstration; Pictures/Videos     Fall Risk Scale: Degroot Total Score: 50  Degroot Fall Risk: High (45 and higher)           OBJECTIVE   Patient denies any increase of symptoms with coughing, sneezing or valsalva maneuver. Patient denies  any headaches, changes in vision, dizziness, vertigo, nausea, drop attacks, black outs, tinnitus, dysphagia, dysarthria.     Observation/Orthostatic Postural Assessment: Posture: Fair; moderate rounded shoulder, elevated scapulae bilaterally into guarded posture, mild forward head.; Incision: healed, no signs of infection. Palpation: Moderate tenderness to palpation upper trap and scalenes mostly right side of neck. Vertebral-Basilar Screen: Unremarkable. ROM:   Cervical extension (degrees): 10°   Cervical flexion: 32°   Cervical left side bend: 18°   Cervical right side bend: 23°   Cervical left rotation: 54°   Cervical right rotation: 49°   Shoulder flexion Right 155   Shoulder flexion left 110     Strength: gross BUE 4 to 4+/5, Gross BLE 4-/5    Joint Mobility: NT today due to high symptom irritability. Special Tests: NT due to post op. Neurological Screen:  Myotomes: Key muscle strength testing for bilateral UE is WNL. Dermatomes: Sensation testing through bilateral upper quadrants for light touch is diminished C5-6 right UE. Reflexes: Biceps (C5), brachioradialis (C6), and triceps (C7) are Normal and Normal.   Neural tension tests: Upper limb tension test is NT. Slump test is NT. Functional Mobility:  Sit to stand: BUE assist, fair control of trunk with BLEs  Supine to sit: Supervision assist, fair understanding of log roll technique. Balance Testing:  Tinetti balance test: NT    ASSESSMENT   Initial Assessment:  Mrs. Aristides Juarez is a 64 y/o female with complaints of neck pain/stiffness and balance/gait difficulties following C5-6 cervical fusion surgery on 4/12/2022. Pt reports no complications since surgery however has noticed some continued muscle weakness in BUEs/BLEs as well as intermittent tingling in RUE and LLE. Pt reports some difficulty with balance and gait and reports fall 4 days ago without injury when getting out of her shower. Pt ambulating mostly with SPC at this time however she does have rolator walker if needed for longer distances. Pt denies dizziness/headache/vision changes. Pt reports bladder dysfunction unrelated to current episode of care that is being treated with spine stimulator.  Pt referred by MD for PT jordan and treat. Pt will benefit from skilled PT treatment to address deficits in strength, AROM, balance, gait and functional mobility in order to return to prior level of function and improve quality of life. Problem List: (Impacting functional limitations): Body Structures, Functions, Activity Limitations Requiring Skilled Therapeutic Intervention: Decreased functional mobility ; Decreased ADL status; Decreased ROM; Decreased body mechanics; Decreased tolerance to work activity; Decreased strength; Decreased endurance; Decreased sensation; Decreased balance; Decreased coordination; Increased pain; Decreased posture     Therapy Prognosis:   Therapy Prognosis: Good     Assessment Complexity:   Low Complexity  PLAN   Effective Dates: 10/12/2022 TO Plan of Care/Certification Expiration Date: 12/07/22 (Start of POC 10/12/2022)   Frequency/Duration: Plan Frequency: 2 visits per week for 8 weeks   Interventions Planned (Treatment may consist of any combination of the following):    Current Treatment Recommendations: Strengthening; ROM; Balance training; Functional mobility training; Transfer training; ADL/Self-care training; Endurance training; Gait training; Stair training; Neuromuscular re-education; Manual Therapy - Soft Tissue Mobilization; Manual Therapy - Joint Manipulation; Pain management; Return to work related activity; Home exercise program; Safety education & training; Patient/Caregiver education & training; Equipment evaluation, education, & procurement; Modalities; Integrated dry needling; Therapeutic activities     Goals: (Goals have been discussed and agreed upon with patient.)  Short-Term Functional Goals: Time Frame: 10/12/2022 to 11/9/2022  Patient demonstrates independence with home exercise program without verbal cueing provided by therapist.  Pt will report worst pain 3/10 or less at rest in order to return to unrestricted prolonged sitting 30 mins or greater.   Pt will report unrestricted sleeping through night 75% of week in order to progress towards sleeping habits consistent with prior level of function. Pt will demonstrate independent supine to sit transfer using log roll technique in order to improve independence and safety with functional transfers. Pt will demonstrate cervical extension AROM to 25 degrees or greater in order to improve independence with upward gaze activities. Discharge Goals: Time Frame: 10/12/2022 to 12/7/2022  Pt will report worst pain 3/10 or less with prolonged standing/walking 15 mins or greater in order to return to unrestricted community distance ambulation. Tinetti balance test score of 19/28 or greater in order to demonstrate improved static/dynamic balance and reduced falls risk. Pt will demonstrate gross BLE strength to 4+/5 or greater in order to return to unrestricted stair navigation. Pt will demonstrate pain free cervical rotation AROM to 55 degrees or greater bilaterally in order to return to unrestricted driving activities. Pt will return to unrestricted walking 1 mile or greater in order to return to walking for recreational exercise. Pt will demonstrate B shoulder flexion AROM to 130 degrees or greater in order to improve independence with functional reaching and lifting to overhead cabinets. NDI score of 15/50 or less in order to demonstrate overall functional improvement. Outcome Measure: Tool Used: Neck Disability Index (NDI)  Score:  Initial: 24/50  Most Recent: X/50 (Date: -- )   Interpretation of Score: The Neck Disability Index is a revised form of the Oswestry Low Back Pain Index and is designed to measure the activities of daily living in person's with neck pain. Each section is scored on a 0-5 scale, 5 representing the greatest disability. The scores of each section are added together for a total score of 50.      Medical Necessity:   > Patient is expected to demonstrate progress in strength, range of motion, balance, coordination, and functional technique to increase independence with community distance ambulation, ADL performance, light house work, driving and functional reaching and lifting activities. > Skilled intervention continues to be required due to decreased AROM, decreased strength, decreased balance, decreased gait, decreased functional mobility. Reason For Services/Other Comments:  > Patient continues to require skilled intervention due to increasing complexity of exercise. .  Total Duration:  Time In: 0902  Time Out: 1003    Regarding Alonso Germanjackie's therapy, I certify that the treatment plan above will be carried out by a therapist or under their direction. Thank you for this referral,  Norma John, PT     Referring Physician Signature:  Ira Easton MD _______________________________ Date _____________        Post Session Pain  Charge Capture  PT Visit Info MD Guidelines  Ave

## 2022-10-14 ENCOUNTER — HOSPITAL ENCOUNTER (OUTPATIENT)
Dept: PHYSICAL THERAPY | Age: 62
Setting detail: RECURRING SERIES
Discharge: HOME | End: 2022-10-17
Payer: MEDICARE

## 2022-10-14 PROBLEM — G47.33 OBSTRUCTIVE SLEEP APNEA SYNDROME: Status: ACTIVE | Noted: 2021-10-12

## 2022-10-14 PROBLEM — Z97.3 WEARS GLASSES: Status: ACTIVE | Noted: 2022-10-14

## 2022-10-14 PROBLEM — M19.011 PRIMARY OSTEOARTHRITIS OF RIGHT SHOULDER: Status: ACTIVE | Noted: 2022-10-14

## 2022-10-14 PROBLEM — M19.90 OSTEOARTHROSIS: Status: ACTIVE | Noted: 2022-10-14

## 2022-10-14 PROBLEM — I10 HYPERTENSION: Status: ACTIVE | Noted: 2019-11-18

## 2022-10-14 PROBLEM — M25.50 PAIN IN JOINT: Status: ACTIVE | Noted: 2021-10-12

## 2022-10-14 PROBLEM — M75.02 SECONDARY ADHESIVE CAPSULITIS OF LEFT SHOULDER: Status: ACTIVE | Noted: 2022-10-14

## 2022-10-14 PROBLEM — M25.512 SHOULDER PAIN, LEFT: Status: ACTIVE | Noted: 2022-10-14

## 2022-10-14 PROBLEM — M70.51 PES ANSERINUS BURSITIS OF BOTH KNEES: Status: ACTIVE | Noted: 2022-10-14

## 2022-10-14 PROBLEM — M75.01 ADHESIVE CAPSULITIS OF RIGHT SHOULDER: Status: ACTIVE | Noted: 2022-10-14

## 2022-10-14 PROBLEM — M17.0 ARTHRITIS OF BOTH KNEES: Status: ACTIVE | Noted: 2022-10-14

## 2022-10-14 PROBLEM — M75.41 IMPINGEMENT SYNDROME OF RIGHT SHOULDER: Status: ACTIVE | Noted: 2022-10-14

## 2022-10-14 PROBLEM — U07.1 COVID-19 VIRUS INFECTION: Status: ACTIVE | Noted: 2021-10-07

## 2022-10-14 PROBLEM — S52.502A CLOSED FRACTURE OF LEFT DISTAL RADIUS: Status: ACTIVE | Noted: 2022-10-14

## 2022-10-14 PROBLEM — J45.909 ASTHMA: Status: ACTIVE | Noted: 2022-10-14

## 2022-10-14 PROBLEM — J45.990 EXERCISE-INDUCED ASTHMA: Status: ACTIVE | Noted: 2021-10-12

## 2022-10-14 PROBLEM — M25.9 DISORDER OF SHOULDER: Status: ACTIVE | Noted: 2022-10-14

## 2022-10-14 PROBLEM — M19.012 PRIMARY OSTEOARTHRITIS OF LEFT SHOULDER: Status: ACTIVE | Noted: 2022-10-14

## 2022-10-14 PROBLEM — M70.52 PES ANSERINUS BURSITIS OF BOTH KNEES: Status: ACTIVE | Noted: 2022-10-14

## 2022-10-14 PROBLEM — E78.00 HYPERCHOLESTEROLEMIA: Status: ACTIVE | Noted: 2022-10-14

## 2022-10-14 PROBLEM — M67.912 DYSFUNCTION OF LEFT ROTATOR CUFF: Status: ACTIVE | Noted: 2022-10-14

## 2022-10-14 PROBLEM — M54.2 CERVICALGIA: Status: ACTIVE | Noted: 2022-01-04

## 2022-10-14 PROBLEM — S49.90XA INJURY OF SHOULDER AND UPPER ARM: Status: ACTIVE | Noted: 2022-10-14

## 2022-10-14 PROBLEM — I48.91 ATRIAL FIBRILLATION (HCC): Status: ACTIVE | Noted: 2021-10-12

## 2022-10-14 PROBLEM — R05.9 COUGH: Status: ACTIVE | Noted: 2021-10-12

## 2022-10-14 PROBLEM — E11.9 TYPE 2 DIABETES MELLITUS (HCC): Status: ACTIVE | Noted: 2021-10-12

## 2022-10-14 PROBLEM — M22.2X2 PATELLOFEMORAL PAIN SYNDROME OF LEFT KNEE: Status: ACTIVE | Noted: 2022-10-14

## 2022-10-14 PROCEDURE — 97140 MANUAL THERAPY 1/> REGIONS: CPT

## 2022-10-14 PROCEDURE — 97110 THERAPEUTIC EXERCISES: CPT

## 2022-10-14 ASSESSMENT — PAIN SCALES - GENERAL: PAINLEVEL_OUTOF10: 6

## 2022-10-14 NOTE — PROGRESS NOTES
Ace Sorto  : 1960  Primary: Medicare Part A And B  Secondary:  [address] @ [address] [place] [address]  Phone: [phone]  Fax: [phone] Plan Frequency: 2 visits per week for 8 weeks    Plan of Care/Certification Expiration Date: 22 (Start of [address] 10/12/2022)      PT Visit Info:  No data recorded   Visit Count:  2   OUTPATIENT PHYSICAL THERAPY:OP NOTE TYPE: Treatment Note 10/14/2022       Episode  }Appt Desk             Treatment Diagnosis:  S/p cervical discectomy (Z98.89)  S/p cervical spinal fusion (Z98.1)  Surgical follow-up care (Z09)  Neck pain (M54.2)  Medical/Referring Diagnosis:  S/P cervical discectomy [Z98.890]  S/P cervical spinal fusion [Z98.1]  Surgical follow-up care [Z09]  Referring Physician:  Marisela Allen MD MD Orders:  PT Eval and Treat   Date of Onset:  Onset Date: 22 (s/p C5-6 cervical fusion)     Allergies:   Latex, Crab extract allergy skin test, Cephalexin, Morphine, Morphine, Zolpidem, and Cephalexin  Restrictions/Precautions:  Restrictions/Precautions: Fall Risk  No data recorded   Interventions Planned (Treatment may consist of any combination of the following):    Current Treatment Recommendations: Strengthening; ROM; Balance training; Functional mobility training; Transfer training; ADL/Self-care training; Endurance training; Gait training; Stair training; Neuromuscular re-education; Manual Therapy - Soft Tissue Mobilization; Manual Therapy - Joint Manipulation; Pain management; Return to work related activity; Home exercise program; Safety education & training; Patient/Caregiver education & training; Equipment evaluation, education, & procurement; Modalities; Integrated dry needling; Therapeutic activities     Subjective Comments:  Pt reports neck pain minimal this morning, low back pain reported this morning.   Initial:}    6/10Post Session:       4/10  Medications Last Reviewed: 10/14/2022  Updated Objective Findings:   Palpation: Mild tenderness to palpation noted bilateral upper trap left worse than right. Treatment   THERAPEUTIC EXERCISE: (45 minutes):    Exercises per grid below to improve mobility, strength, balance, and coordination. Required moderate visual, verbal, manual, and tactile cues to promote proper body alignment, promote proper body poster and proper body mechanics. Progressed resistance, range, repetitions and complexity of movement as indicated. Date:  10/12/2022 Date:  10/14/22 Date:     Activity/Exercise Parameters Parameters Parameters   Cervical AROM Supine cervical rotation, x20 each Supine, cervical rotation, x20 each    Chin tuck Supine, 10x5 sec Supine, 15.5sec    Scap retract Seated,x20 Seated x30    Supine pec stretch [id number]    Cervical isometrics Not today Sidebend [id number]    Rotation [id number]    TB Row  Red TB, 2x10    TB Shoulder extension  Red TB, 2x10    TRX  Flexion x10      Time spent with patient reviewing proper muscle recruitment and technique with exercises. MANUAL THERAPY: (10 minutes):   Joint mobilization and Soft tissue mobilization was utilized and necessary because of the patient's restricted joint motion, painful spasm, loss of articular motion, and restricted motion of soft tissue. - Soft tissue mobilization: bilateral upper traps/levator scap. MODALITIES: (0 minutes):        None today. HEP: As above; handouts given to patient for all exercises. Treatment/Session Summary:    Treatment Assessment:  Excellent tolerance to progression of exercise program.  Communication/Consultation:   HEP handout provided. Equipment provided today:  None  Recommendations/Intent for next treatment session: Next visit will focus on pain control, improve cervical spine mobility and strength, improve posture, progress balance/gait/functional mobility. Total Treatment Billable Duration:  55 mins.   Time In: 0703  Time Out: [address] [facility], PT       Charge Capture  }Post Session Pain  PT Visit [address] Portal  MD Guidelines  Scanned Media  Benefits  MyChart    Future Appointments   Date Time Provider Rosaura Jayda   10/14/2022 11:45 AM SASHA Peraza [facility]. Fr   10/18/2022  9:00 AM Phelps Grant, PT [facility]   10/21/2022  9:00 AM Delray Slidell, PTA [facility]   10/25/2022  9:00 AM Delray Slidell, PTA [facility]   10/28/2022  9:00 AM Delray Slidell, PTA [facility]   11/2/2022 10:00 AM Naif Grant, PT [facility]   11/4/2022  9:00 AM Delray Tho, PTA [facility]   11/8/2022  9:00 AM Delray Tho, PTA [facility]   11/10/2022  9:00 AM Naif Grant, PT [facility]   11/15/2022  9:00 AM Phelps Grant, PT [facility]   11/18/2022  9:00 AM Delray Slidell, PTA [facility]   11/22/2022  9:00 AM Delray Slidell, PTA [facility]   11/29/2022  9:00 AM Phelps Grant, PT [facility]   12/2/2022  9:00 AM Naif Grant, PT [facility]   1/11/2023  1:15 PM Kelley Schirmer, MD [facility]

## 2022-10-18 ENCOUNTER — HOSPITAL ENCOUNTER (OUTPATIENT)
Dept: PHYSICAL THERAPY | Age: 62
Setting detail: RECURRING SERIES
Discharge: HOME | End: 2022-10-21
Payer: MEDICARE

## 2022-10-18 PROCEDURE — 97140 MANUAL THERAPY 1/> REGIONS: CPT

## 2022-10-18 PROCEDURE — 97110 THERAPEUTIC EXERCISES: CPT

## 2022-10-18 ASSESSMENT — PAIN SCALES - GENERAL: PAINLEVEL_OUTOF10: 4

## 2022-10-18 NOTE — PROGRESS NOTES
Brett Munson  : 1960  Primary: Medicare Part A And B  Secondary:  [address] @ [address] [facility] [address]  Phone: [phone]  Fax: [phone] Plan Frequency: 2 visits per week for 8 weeks    Plan of Care/Certification Expiration Date: 22 (Start of [address] 10/12/2022)      PT Visit Info:  No data recorded   Visit Count:  3   OUTPATIENT PHYSICAL THERAPY:OP NOTE TYPE: Treatment Note 10/18/2022       Episode  }Appt Desk             Treatment Diagnosis:  S/p cervical discectomy (Z98.89)  S/p cervical spinal fusion (Z98.1)  Surgical follow-up care (Z09)  Neck pain (M54.2)  Medical/Referring Diagnosis:  S/P cervical discectomy [Z98.890]  S/P cervical spinal fusion [Z98.1]  Surgical follow-up care [Z09]  Referring Physician:  Wally Larson MD MD Orders:  PT Eval and Treat   Date of Onset:  Onset Date: 22 (s/p C5-6 cervical fusion)     Allergies:   Latex, Crab extract allergy skin test, Cephalexin, Morphine, Morphine, Zolpidem, Adhesive tape, and Cephalexin  Restrictions/Precautions:  Restrictions/Precautions: Fall Risk  No data recorded   Interventions Planned (Treatment may consist of any combination of the following):    Current Treatment Recommendations: Strengthening; ROM; Balance training; Functional mobility training; Transfer training; ADL/Self-care training; Endurance training; Gait training; Stair training; Neuromuscular re-education; Manual Therapy - Soft Tissue Mobilization; Manual Therapy - Joint Manipulation; Pain management; Return to work related activity; Home exercise program; Safety education & training; Patient/Caregiver education & training; Equipment evaluation, education, & procurement; Modalities; Integrated dry needling; Therapeutic activities     Subjective Comments:  Pt reports recent travels over weekend caused more knee pain than neck pain.  Increased bilateral knee pain reported today, mild neck pain.  Initial:}    4/10Post Session:       3/10  Medications Last Reviewed:  10/18/2022  Updated Objective Findings:   Palpation: increased tightness/tenderness bilateral SCM. Treatment   THERAPEUTIC EXERCISE: (43 minutes):    Exercises per grid below to improve mobility, strength, balance, and coordination. Required moderate visual, verbal, manual, and tactile cues to promote proper body alignment, promote proper body poster and proper body mechanics. Progressed resistance, range, repetitions and complexity of movement as indicated. Date:  10/12/2022 Date:  10/14/22 Date:  10/18/22   Activity/Exercise Parameters Parameters Parameters   Cervical AROM Supine cervical rotation, x20 each Supine, cervical rotation, x20 each Supine, cervical rotation, x20   Chin tuck Supine, 10x5 sec Supine, 15.5sec Supine, [id number]   Scap retract Seated,x20 Seated x30 Seated x30   Supine pec stretch [id number] [id number] Verbal review   Cervical isometrics Not today Sidebend 82a8dvz    Rotation 56r9lpe Sidebend 54d0wit    Rotation 05s6lrb   TB Row  Red TB, 2x10 Red TB, 2x10   TB Shoulder extension  Red TB, 2x10 Red TB, 2x10   TRX  Flexion x10 Pulley for shoulder flexion and thoracic extension 3 mins   Open book   X10 each   Thoracic extension   Next visit     Time spent with patient reviewing proper muscle recruitment and technique with exercises. MANUAL THERAPY: (10 minutes):   Joint mobilization and Soft tissue mobilization was utilized and necessary because of the patient's restricted joint motion, painful spasm, loss of articular motion, and restricted motion of soft tissue. - Soft tissue mobilization: bilateral upper traps/levator scap, cervical paraspinals, B SCM. MODALITIES: (0 minutes):        None today. HEP: As above; handouts given to patient for all exercises. Treatment/Session Summary:    Treatment Assessment:  Good tolerance to progression of exercise program observed today.   Communication/Consultation: HEP handout provided. Equipment provided today:  None  Recommendations/Intent for next treatment session: Next visit will focus on pain control, improve cervical spine mobility and strength, improve posture, progress balance/gait/functional mobility. Total Treatment Billable Duration:  53 mins.   Time In: 5129  Time Out: [address], PT       Charge Capture  }Post Session Pain  PT Visit Info  [facility] Portal  MD Guidelines  Scanned Media  Benefits  MyChart    Future Appointments   Date Time Provider Rosaura Montelongo   10/21/2022  9:00 AM Yannick Grams, PTA [facility]   10/25/2022  9:00 AM Yannick Grams, PTA [facility]   10/28/2022  9:00 AM Yannick Grams, PTA [facility]   11/2/2022 10:00 AM Percilla Lodi, PT [facility]   11/4/2022  9:00 AM Yannick Grams, PTA [facility]   11/8/2022  9:00 AM Yannick Grams, PTA [facility]   11/10/2022  9:00 AM Percilla Lodi, PT [facility]   11/15/2022  9:00 AM Percilla Lodi, PT [facility]   11/18/2022  9:00 AM Yannick Grams, PTA [facility]   11/22/2022  9:00 AM Yannick Grams, PTA [facility]   11/29/2022  9:00 AM Percilla Lodi, PT [facility]   12/2/2022  9:00 AM Percilla Lodi, PT [facility]   1/11/2023  1:15 PM Thelma Holbrook MD [facility]

## 2022-10-21 ENCOUNTER — HOSPITAL ENCOUNTER (OUTPATIENT)
Dept: PHYSICAL THERAPY | Age: 62
Setting detail: RECURRING SERIES
Discharge: HOME | End: 2022-10-24
Payer: MEDICARE

## 2022-10-21 PROCEDURE — 97140 MANUAL THERAPY 1/> REGIONS: CPT

## 2022-10-21 PROCEDURE — 97110 THERAPEUTIC EXERCISES: CPT

## 2022-10-21 ASSESSMENT — PAIN SCALES - GENERAL: PAINLEVEL_OUTOF10: 3

## 2022-10-21 NOTE — PROGRESS NOTES
Jann Munson  : 1960  Primary: Medicare Part A And B  Secondary:  [address] [facility] @ [address] [place] [address]  Phone: [phone]  Fax: [phone] Plan Frequency: 2 visits per week for 8 weeks    Plan of Care/Certification Expiration Date: 22 (Start of [address] 10/12/2022)      PT Visit Info:  No data recorded   Visit Count:  4   OUTPATIENT PHYSICAL THERAPY:OP NOTE TYPE: Treatment Note 10/21/2022       Episode  }Appt Desk             Treatment Diagnosis:  S/p cervical discectomy (Z98.89)  S/p cervical spinal fusion (Z98.1)  Surgical follow-up care (Z09)  Neck pain (M54.2)  Medical/Referring Diagnosis:  S/P cervical discectomy [Z98.890]  S/P cervical spinal fusion [Z98.1]  Surgical follow-up care [Z09]  Referring Physician:  Saqib Cabral MD MD Orders:  PT Eval and Treat   Date of Onset:  Onset Date: 22 (s/p C5-6 cervical fusion)     Allergies:   Latex, Crab extract allergy skin test, Cephalexin, Morphine, Morphine, Zolpidem, Adhesive tape, and Cephalexin  Restrictions/Precautions:  Restrictions/Precautions: Fall Risk  No data recorded   Interventions Planned (Treatment may consist of any combination of the following):    Current Treatment Recommendations: Strengthening; ROM; Balance training; Functional mobility training; Transfer training; ADL/Self-care training; Endurance training; Gait training; Stair training; Neuromuscular re-education; Manual Therapy - Soft Tissue Mobilization; Manual Therapy - Joint Manipulation; Pain management; Return to work related activity; Home exercise program; Safety education & training; Patient/Caregiver education & training; Equipment evaluation, education, & procurement; Modalities; Integrated dry needling; Therapeutic activities     Subjective Comments:  Pt 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   Initial:}    3/10Post Session:       1/10  Medications Last Reviewed:  10/21/2022  Updated Objective Findings:   Palpation: increased tightness/tenderness bilateral SCM. Treatment   THERAPEUTIC EXERCISE: (38 minutes):    Exercises per grid below to improve mobility, strength, balance, and coordination. Required moderate visual, verbal, manual, and tactile cues to promote proper body alignment, promote proper body poster and proper body mechanics. Progressed resistance, range, repetitions and complexity of movement as indicated. Date:  10/21/2022 Date:  10/14/22 Date:  10/18/22   Activity/Exercise Parameters Parameters Parameters   Cervical AROM Supine cervical rotation, x20 each Supine, cervical rotation, x20 each Supine, cervical rotation, x20   Chin tuck Supine, 20x5 sec Supine, 15.5sec Supine, 16z9mcf   Scap retract Seated,x20 Seated x30 Seated x30   Supine pec stretch 5r49rhe    Doorway, arms low, 3u90drn 2t09dri Verbal review   Cervical isometrics Sidebend 21o3ens    Rotation 35a0xno Sidebend 61i1bms    Rotation 91u9vdc Sidebend 56n7llp    Rotation 88e7htk   TB Row Green x15 Red TB, 2x10 Red TB, 2x10   TB Shoulder extension Red x15 Red TB, 2x10 Red TB, 2x10   TRX -- Flexion x10 Pulley for shoulder flexion and thoracic extension 3 mins   Open book --  X10 each   Thoracic extension --  Next visit     Time spent with patient reviewing proper muscle recruitment and technique with exercises. MANUAL THERAPY: (15 minutes):   Joint mobilization and Soft tissue mobilization was utilized and necessary because of the patient's restricted joint motion, painful spasm, loss of articular motion, and restricted motion of soft tissue. - Soft tissue mobilization: bilateral upper traps/levator scap, cervical paraspinals, B SCM. MODALITIES: (0 minutes):        None today. HEP: As above; handouts given to patient for all exercises.       Treatment/Session Summary:    Treatment Assessment:  Progressed pec stretch exercise to promote improved posture and flexibiltiy; good tolerance to all exercises performed. Communication/Consultation:   HEP handout provided. Equipment provided today:  None  Recommendations/Intent for next treatment session: Next visit will focus on pain control, improve cervical spine mobility and strength, improve posture, progress balance/gait/functional mobility. Total Treatment Billable Duration:  53 mins.   Time In: 6057  Time Out: [address], PT       Charge Capture  }Post Session Pain  PT Visit [address] Portal  MD [address]    Future Appointments   Date Time Provider Rosaura Montelongo   10/25/2022  9:00 AM Christel Jewsaundra, PTA [facility] [place]   10/28/2022  9:00 AM Christel Jewels, PTA [facility] [place]   11/2/2022 10:00 AM Kristin Dikes, PT [facility]   11/4/2022  9:00 AM Christel Jewels, PTA [facility]   11/8/2022  9:00 AM Christel Jewels, PTA [facility]   11/10/2022  9:00 AM Kristin Dikes, PT [facility]   11/15/2022  9:00 AM Kristin Dikes, PT [facility]   11/18/2022  9:00 AM Christel Jewels, PTA [facility]   11/22/2022  9:00 AM Christel Jewels, PTA [facility]   11/29/2022  9:00 AM Kristin Dikes, PT [facility]   12/2/2022  9:00 AM Kristin Dikes, PT [facility]   1/11/2023  1:15 PM Tono Rm MD [facility]

## 2022-10-25 ENCOUNTER — HOSPITAL ENCOUNTER (OUTPATIENT)
Dept: PHYSICAL THERAPY | Age: 62
Setting detail: RECURRING SERIES
End: 2022-10-25
Payer: MEDICARE

## 2022-10-28 ENCOUNTER — HOSPITAL ENCOUNTER (OUTPATIENT)
Dept: PHYSICAL THERAPY | Age: 62
Setting detail: RECURRING SERIES
End: 2022-10-28
Payer: MEDICARE

## 2022-11-02 ENCOUNTER — HOSPITAL ENCOUNTER (OUTPATIENT)
Dept: PHYSICAL THERAPY | Age: 62
Setting detail: RECURRING SERIES
End: 2022-11-02
Payer: MEDICARE

## 2022-11-02 NOTE — PROGRESS NOTES
Physical Therapy  [address] [place] [address]  Date: 11/2/2022    Patient cancelled due to illness.       Frances Arechiga DPT

## 2022-11-04 ENCOUNTER — HOSPITAL ENCOUNTER (OUTPATIENT)
Dept: PHYSICAL THERAPY | Age: 62
Setting detail: RECURRING SERIES
Discharge: HOME | End: 2022-11-07
Payer: MEDICARE

## 2022-11-04 PROCEDURE — 97140 MANUAL THERAPY 1/> REGIONS: CPT

## 2022-11-04 PROCEDURE — 97110 THERAPEUTIC EXERCISES: CPT

## 2022-11-04 ASSESSMENT — PAIN SCALES - GENERAL: PAINLEVEL_OUTOF10: 3

## 2022-11-04 NOTE — PROGRESS NOTES
Mariajose Munson  : 1960  Primary: Medicare Part A And B  Secondary:  [address] @ [address] [place] [address]  Phone: [phone]  Fax: [phone] Plan Frequency: 2 visits per week for 8 weeks    Plan of Care/Certification Expiration Date: 22 (Start of [address] 10/12/2022)      PT Visit Info:  No data recorded   Visit Count:  5   OUTPATIENT PHYSICAL THERAPY:OP NOTE TYPE: Treatment Note 2022       Episode  }Appt Desk             Treatment Diagnosis:  S/p cervical discectomy (Z98.89)  S/p cervical spinal fusion (Z98.1)  Surgical follow-up care (Z09)  Neck pain (M54.2)  Medical/Referring Diagnosis:  S/P cervical discectomy [Z98.890]  S/P cervical spinal fusion [Z98.1]  Surgical follow-up care [Z09]  Referring Physician:  Jami Encarnacion MD MD Orders:  PT Eval and Treat   Date of Onset:  Onset Date: 22 (s/p C5-6 cervical fusion)     Allergies:   Latex, Crab extract allergy skin test, Cephalexin, Morphine, Morphine, Zolpidem, Adhesive tape, and Cephalexin  Restrictions/Precautions:  Restrictions/Precautions: Fall Risk  No data recorded   Interventions Planned (Treatment may consist of any combination of the following):    Current Treatment Recommendations: Strengthening; ROM; Balance training; Functional mobility training; Transfer training; ADL/Self-care training; Endurance training; Gait training; Stair training; Neuromuscular re-education; Manual Therapy - Soft Tissue Mobilization; Manual Therapy - Joint Manipulation; Pain management; Return to work related activity; Home exercise program; Safety education & training; Patient/Caregiver education & training; Equipment evaluation, education, & procurement; Modalities; Integrated dry needling;  Therapeutic activities     Subjective Comments:  Patient reports steady improvement  Initial:}    3/10Post Session:       1/10  Medications Last Reviewed:  2022  Updated Objective Findings: Palpation: increased tightness/tenderness bilateral SCM. Treatment   THERAPEUTIC EXERCISE: (38 minutes):    Exercises per grid below to improve mobility, strength, balance, and coordination. Required moderate visual, verbal, manual, and tactile cues to promote proper body alignment, promote proper body poster and proper body mechanics. Progressed resistance, range, repetitions and complexity of movement as indicated. Date:  10/21/2022 Date:  11/4/22 Date:  10/18/22   Activity/Exercise Parameters Parameters Parameters   Cervical AROM Supine cervical rotation, x20 each Seated , cervical rotation, x20 each Supine, cervical rotation, x20   Chin tuck Supine, 20x5 sec Seated , 20.5sec Supine, 10j0gin   Scap retract Seated,x20 Seated x30 Seated x30   Supine pec stretch 4i19osi    Doorway, arms low, 5v51zja 4p15fvv Verbal review   Cervical isometrics Sidebend 44f4cbi    Rotation 13n8hgq Sidebend 51t4vjv    Rotation 34v3njl Sidebend 08q3txd    Rotation 07o3vnl   TB Row Green x15 Blue   TB, 2x10 Red TB, 2x10   TB Shoulder extension Red x15 Blue   TB, 2x10 Red TB, 2x10   TRX -- Flexion x10 Pulley for shoulder flexion and thoracic extension 3 mins   Open book --  X10 each   Thoracic extension --  Next visit     Time spent with patient reviewing proper muscle recruitment and technique with exercises. MANUAL THERAPY: (15 minutes):   Joint mobilization and Soft tissue mobilization was utilized and necessary because of the patient's restricted joint motion, painful spasm, loss of articular motion, and restricted motion of soft tissue. - Soft tissue mobilization: bilateral upper traps/levator scap, cervical paraspinals, B SCM. MODALITIES: (0 minutes):        None today. HEP: As above; handouts given to patient for all exercises. Treatment/Session Summary:    Treatment Assessment:  Tolerated exercises well.  Progressing ROM  Communication/Consultation:  None today  Equipment provided today: None  Recommendations/Intent for next treatment session: Next visit will focus on pain control, improve cervical spine mobility and strength, improve posture, progress balance/gait/functional mobility. Total Treatment Billable Duration:  53 mins.   Time In: 5390  Time Out: 5694    BOO AKINS PTA       Charge Capture  }Post Session Pain  PT Visit Info  [employer] Portal  MD Guidelines  Scanned Media  Benefits  MyChart    Future Appointments   Date Time Provider Rosaura Montelongo   11/8/2022  9:00 AM Joenathan Slim, PTA [facility]   11/10/2022  9:00 AM Delman Dove, PT [facility]   11/14/2022  8:00 AM Joenathan Slim, PTA [facility]   11/18/2022  9:00 AM Joenathan Slim, PTA [facility]   11/22/2022  9:00 AM Joenathan Slim, PTA [facility]   11/29/2022  9:00 AM Delman Dove, PT [facility]   12/2/2022  9:00 AM Delman Dove, PT [facility]   1/11/2023  1:15 PM Lopez Mota MD [facility]

## 2022-11-08 ENCOUNTER — HOSPITAL ENCOUNTER (OUTPATIENT)
Dept: PHYSICAL THERAPY | Age: 62
Setting detail: RECURRING SERIES
Discharge: HOME | End: 2022-11-11
Payer: MEDICARE

## 2022-11-08 PROCEDURE — 97140 MANUAL THERAPY 1/> REGIONS: CPT

## 2022-11-08 PROCEDURE — 97110 THERAPEUTIC EXERCISES: CPT

## 2022-11-08 ASSESSMENT — PAIN SCALES - GENERAL: PAINLEVEL_OUTOF10: 5

## 2022-11-08 NOTE — PROGRESS NOTES
Cande Munson  : 1960  Primary: Medicare Part A And B  Secondary:  [address] @ [address] [facility] [address]  Phone: [phone]  Fax: [phone] Plan Frequency: 2 visits per week for 8 weeks    Plan of Care/Certification Expiration Date: 22 (Start of [address] 10/12/2022)      PT Visit Info:  No data recorded   Visit Count:  6   OUTPATIENT PHYSICAL THERAPY:OP NOTE TYPE: Treatment Note 2022       Episode  }Appt Desk             Treatment Diagnosis:  S/p cervical discectomy (Z98.89)  S/p cervical spinal fusion (Z98.1)  Surgical follow-up care (Z09)  Neck pain (M54.2)  Medical/Referring Diagnosis:  S/P cervical discectomy [Z98.890]  S/P cervical spinal fusion [Z98.1]  Surgical follow-up care [Z09]  Referring Physician:  Elaine Wood MD MD Orders:  PT Eval and Treat   Date of Onset:  Onset Date: 22 (s/p C5-6 cervical fusion)     Allergies:   Latex, Crab extract allergy skin test, Cephalexin, Morphine, Morphine, Zolpidem, Adhesive tape, and Cephalexin  Restrictions/Precautions:  Restrictions/Precautions: Fall Risk  No data recorded   Interventions Planned (Treatment may consist of any combination of the following):    Current Treatment Recommendations: Strengthening; ROM; Balance training; Functional mobility training; Transfer training; ADL/Self-care training; Endurance training; Gait training; Stair training; Neuromuscular re-education; Manual Therapy - Soft Tissue Mobilization; Manual Therapy - Joint Manipulation; Pain management; Return to work related activity; Home exercise program; Safety education & training; Patient/Caregiver education & training; Equipment evaluation, education, & procurement; Modalities; Integrated dry needling;  Therapeutic activities     Subjective Comments:  Patient reports increased pain today after helping her  do some minor car repairs yesterday  Initial:}    5/10Post Session: /10  Medications Last Reviewed:  11/8/2022  Updated Objective Findings:  None Today  Treatment   THERAPEUTIC EXERCISE: (30 minutes):    Exercises per grid below to improve mobility, strength, balance, and coordination. Required moderate visual, verbal, manual, and tactile cues to promote proper body alignment, promote proper body poster and proper body mechanics. Progressed resistance, range, repetitions and complexity of movement as indicated. Date:  10/21/2022 Date:  11/4/22 Date:  11/8/22   Activity/Exercise Parameters Parameters Parameters   Cervical AROM Supine cervical rotation, x20 each Seated , cervical rotation, x20 each Seated , cervical rotation, x20   Chin tuck Supine, 20x5 sec Seated , 20.5sec Seated , 98z5nfa   Scap retract Seated,x20 Seated x30 Seated x30   Supine pec stretch 1d06ige    Doorway, arms low, 7i41rkq 8m42jkd Verbal review   Cervical isometrics Sidebend 63j6vjs    Rotation 07w9fhc Sidebend 93d7jty    Rotation 41h8kog Sidebend 35x1mql    Rotation 85p7zij   TB Row Green x15 Blue   TB, 2x10 Blue  TB, 2x10   TB Shoulder extension Red x15 Blue   TB, 2x10 Green  TB, 2x10   TRX -- Flexion x10    Open book --     Thoracic extension --  Swiss ball x 30     Time spent with patient reviewing proper muscle recruitment and technique with exercises. MANUAL THERAPY: (25 minutes):   Joint mobilization and Soft tissue mobilization was utilized and necessary because of the patient's restricted joint motion, painful spasm, loss of articular motion, and restricted motion of soft tissue. - Soft tissue mobilization: bilateral upper traps/levator scap, cervical paraspinals, B SCM. emphasizing right    MODALITIES: (0 minutes):        None today. HEP: As above; handouts given to patient for all exercises.       Treatment/Session Summary:    Treatment Assessment:  no omplaints with exercises  Communication/Consultation:  None today  Equipment provided today:  None  Recommendations/Intent for next treatment session: Next visit will focus on pain control, improve cervical spine mobility and strength, improve posture, progress balance/gait/functional mobility. Total Treatment Billable Duration:  53 mins.   Time In: 8969  Time Out: 0958    BOO AKINS PTA       Charge Capture  }Post Session Pain  PT Visit Info  [employer] Portal  MD Guidelines  Scanned Media  Benefits  [employer]    Future Appointments   Date Time Provider Rosaura Montelongo   11/10/2022  9:00 AM [facility]   11/14/2022  8:00 AM Justo Maradiaga PTA [facility]   11/18/2022  9:00 AM Justo Maradiaga PTA [facility]   11/22/2022  9:00 AM Justo Maradiaga PTA [facility]   11/29/2022  9:00 AM Vu Dorantes, PT [facility]   12/2/2022  9:00 AM Vu Dorantes, PT [facility]   1/11/2023  1:15 PM Anaya Rodríguez MD [facility]

## 2022-11-10 ENCOUNTER — HOSPITAL ENCOUNTER (OUTPATIENT)
Dept: PHYSICAL THERAPY | Age: 62
Setting detail: RECURRING SERIES
Discharge: HOME | End: 2022-11-13
Payer: MEDICARE

## 2022-11-10 PROCEDURE — 97110 THERAPEUTIC EXERCISES: CPT

## 2022-11-10 PROCEDURE — 97140 MANUAL THERAPY 1/> REGIONS: CPT

## 2022-11-10 ASSESSMENT — PAIN SCALES - GENERAL: PAINLEVEL_OUTOF10: 4

## 2022-11-10 NOTE — PROGRESS NOTES
Duke Munson  : 1960  Primary: Medicare Part A And B  Secondary:  [address] @ [address] [facility] [address]  Phone: [phone]  Fax: [phone] Plan Frequency: 2 visits per week for 8 weeks    Plan of Care/Certification Expiration Date: 22 (Start of [address] 10/12/2022)      PT Visit Info:  No data recorded   Visit Count:  7   OUTPATIENT PHYSICAL THERAPY:OP NOTE TYPE: Treatment Note 11/10/2022       Episode  }Appt Desk             Treatment Diagnosis:  S/p cervical discectomy (Z98.89)  S/p cervical spinal fusion (Z98.1)  Surgical follow-up care (Z09)  Neck pain (M54.2)  Medical/Referring Diagnosis:  S/P cervical discectomy [Z98.890]  S/P cervical spinal fusion [Z98.1]  Surgical follow-up care [Z09]  Referring Physician:  Marisela Allen MD MD Orders:  PT Eval and Treat   Date of Onset:  Onset Date: 22 (s/p C5-6 cervical fusion)     Allergies:   Latex, Crab extract allergy skin test, Cephalexin, Morphine, Morphine, Zolpidem, Adhesive tape, and Cephalexin  Restrictions/Precautions:  Restrictions/Precautions: Fall Risk  No data recorded   Interventions Planned (Treatment may consist of any combination of the following):    Current Treatment Recommendations: Strengthening; ROM; Balance training; Functional mobility training; Transfer training; ADL/Self-care training; Endurance training; Gait training; Stair training; Neuromuscular re-education; Manual Therapy - Soft Tissue Mobilization; Manual Therapy - Joint Manipulation; Pain management; Return to work related activity; Home exercise program; Safety education & training; Patient/Caregiver education & training; Equipment evaluation, education, & procurement; Modalities; Integrated dry needling;  Therapeutic activities     Subjective Comments:  Pt reports shoulder and neck pain symptoms are \"doing great\"; per pt primary complaint is knee pain today due to helping her  with automobile work. Initial:}    4/10Post Session:       3/10  Medications Last Reviewed:  11/10/2022  Updated Objective Findings:   See progress note dated 11/10/2022. Treatment   THERAPEUTIC EXERCISE: (38 minutes):    Exercises per grid below to improve mobility, strength, balance, and coordination. Required moderate visual, verbal, manual, and tactile cues to promote proper body alignment, promote proper body poster and proper body mechanics. Progressed resistance, range, repetitions and complexity of movement as indicated. Date:  11/10/2022 Date:  11/4/22 Date:  11/8/22   Activity/Exercise Parameters Parameters Parameters   Cervical AROM Supine cervical rotation, x30 each Seated , cervical rotation, x20 each Seated , cervical rotation, x20   Chin tuck Supine, 20x5 sec Seated , 20.5sec Seated , 49l0aax   Scap retract Seated,x20 Seated x30 Seated x30   Supine pec stretch Doorway, arms low, 9v76scu 9h29hrs Verbal review   Cervical isometrics Sidebend 32t6jru    Rotation 69s0dya Sidebend 02p0ekr    Rotation 97z2ogv Sidebend 97u6usu    Rotation 05y2uep   TB Row Blue 2x10 Blue   TB, 2x10 Blue  TB, 2x10   TB Shoulder extension Blue 2x10 Blue   TB, 2x10 Green  TB, 2x10   TRX Flexion/extension x20 Flexion x10    Open book --     Thoracic extension --  Swiss ball x 30     Time spent with patient reviewing proper muscle recruitment and technique with exercises. MANUAL THERAPY: (15 minutes):   Joint mobilization and Soft tissue mobilization was utilized and necessary because of the patient's restricted joint motion, painful spasm, loss of articular motion, and restricted motion of soft tissue. - Soft tissue mobilization: bilateral upper traps/levator scap, cervical paraspinals, B SCM. emphasizing right. MODALITIES: (0 minutes):        None today. HEP: As above; handouts given to patient for all exercises.       Treatment/Session Summary:    Treatment Assessment:     Communication/Consultation:  None today  Equipment provided today:  None  Recommendations/Intent for next treatment session: Next visit will focus on pain control, improve cervical spine mobility and strength, improve posture, progress balance/gait/functional mobility. Total Treatment Billable Duration:  53 mins.   Time In: 0902  Time Out: [address], PT       Charge Capture  }Post Session Pain  PT Visit Info  [facility] Portal  MD Guidelines  Scanned Media  Benefits  MyChart    Future Appointments   Date Time Provider Rosaura Montelongo   11/14/2022  8:00 AM Marylen Littles, PTA [facility]   11/18/2022  9:00 AM [facility]   11/22/2022  9:00 AM Marylen Littles, PTA [facility]   11/30/2022  9:00 AM Amalia Bowser, CHERELLE BEST [facility]   12/2/2022  9:00 AM [facility]   1/11/2023  1:15 PM Jacqueline Soares MD [facility]

## 2022-11-10 NOTE — PROGRESS NOTES
Daya Burgos Gutierrez Arpit  : 1960  Primary:   Secondary:  [address] @ [address]  [address]  Phone: [phone]  Fax: [phone] Plan Frequency: 2 visits per week for 8 weeks  Plan of Care/Certification Expiration Date: 22 (Start of [address] 10/12/2022)      PT Visit Info:         Visit Count:  7    OUTPATIENT PHYSICAL THERAPY:OP NOTE TYPE: Progress Report 11/10/2022               Episode  Appt Desk         Treatment Diagnosis:  S/p cervical discectomy (Z98.89)  S/p cervical spinal fusion (Z98.1)  Surgical follow-up care (Z09)  Neck pain (M54.2)  Medical/Referring Diagnosis:  S/P cervical discectomy [Z98.890]  S/P cervical spinal fusion [Z98.1]  Surgical follow-up care [Z09]  Referring Physician:  Kevon Espinoza MD MD Orders:  PT Eval and Treat  Return MD Appt:  2023  Date of Onset:  Onset Date: 22 (s/p C5-6 cervical fusion)     Allergies:  Latex, Crab extract allergy skin test, Cephalexin, Morphine, Morphine, Zolpidem, Adhesive tape, and Cephalexin  Restrictions/Precautions:    Restrictions/Precautions: Fall Risk      Medications Last Reviewed:  11/10/2022      OBJECTIVE   Patient denies any increase of symptoms with coughing, sneezing or valsalva maneuver. Patient denies  any headaches, changes in vision, dizziness, vertigo, nausea, drop attacks, black outs, tinnitus, dysphagia, dysarthria. Observation/Orthostatic Postural Assessment: Posture: Good, mild rounded shoulders in seated, mild forward head.; Incision: healed, no signs of infection. Palpation: Mild tenderness to palpation upper trap and scalenes mostly right side of neck. Vertebral-Basilar Screen: Unremarkable.     ROM:   Cervical extension (degrees): 30°   Cervical flexion: 32°   Cervical left side bend: 18°   Cervical right side bend: 23°   Cervical left rotation: 60°   Cervical right rotation: 62°   Shoulder flexion Right 145   Shoulder flexion left 155 Strength: gross BUE 4 to 4+/5, Gross BLE 4-/5    Joint Mobility: NT today due to high symptom irritability. Special Tests: NT due to post op. Neurological Screen:  Myotomes: Key muscle strength testing for bilateral UE is WNL. Dermatomes: Sensation testing through bilateral upper quadrants for light touch is diminished C5-6 right UE. Reflexes: Biceps (C5), brachioradialis (C6), and triceps (C7) are Normal and Normal.   Neural tension tests: Upper limb tension test is NT. Slump test is NT. Functional Mobility:  Sit to stand: BUE assist, fair control of trunk with BLEs  Supine to sit: Supervision assist, fair understanding of log roll technique. Balance Testing:  Tinetti balance test: 16/28    ASSESSMENT   Initial Assessment:  Mrs. Lily Bills is a 62 y/o female with complaints of neck pain/stiffness and balance/gait difficulties following C5-6 cervical fusion surgery on 4/12/2022. Pt reports no complications since surgery however has noticed some continued muscle weakness in BUEs/BLEs as well as intermittent tingling in RUE and LLE. Pt reports some difficulty with balance and gait and reports fall 4 days ago without injury when getting out of her shower. Pt ambulating mostly with SPC at this time however she does have rolator walker if needed for longer distances. Pt denies dizziness/headache/vision changes. Pt reports bladder dysfunction unrelated to current episode of care that is being treated with spine stimulator. Pt referred by MD for PT eval and treat. Pt will benefit from skilled PT treatment to address deficits in strength, AROM, balance, gait and functional mobility in order to return to prior level of function and improve quality of life. PROGRESS NOTE 11/10/2022: Pt has completed 7 PT treatment sessions from 10/12/22 to 11/10/22. Pt demonstrating improved cervical and right shoulder AROM since starting PT treatment.  Pt reports decreased overall subjective pain levels since starting PT treatment. Limitations remain primarily with dressing and bathing ADLs including reaching behind back for dressing and overhead for hair care activities. Pt will benefit from skilled PT treatment to address deficits in strength, AROM, and functional mobility in order to return to prior level of function and improve quality of life. Problem List: (Impacting functional limitations): Body Structures, Functions, Activity Limitations Requiring Skilled Therapeutic Intervention: Decreased functional mobility ; Decreased ADL status; Decreased ROM; Decreased body mechanics; Decreased tolerance to work activity; Decreased strength; Decreased endurance; Decreased sensation; Decreased balance; Decreased coordination; Increased pain; Decreased posture     Therapy Prognosis:   Therapy Prognosis: Good     Assessment Complexity:      PLAN   Effective Dates: 10/12/2022 TO Plan of Care/Certification Expiration Date: 12/07/22 (Start of POC 10/12/2022)     Frequency/Duration: Plan Frequency: 2 visits per week for 8 weeks     Interventions Planned (Treatment may consist of any combination of the following):    Current Treatment Recommendations: Strengthening; ROM; Balance training; Functional mobility training; Transfer training; ADL/Self-care training; Endurance training; Gait training; Stair training; Neuromuscular re-education; Manual Therapy - Soft Tissue Mobilization; Manual Therapy - Joint Manipulation; Pain management; Return to work related activity; Home exercise program; Safety education & training; Patient/Caregiver education & training; Equipment evaluation, education, & procurement; Modalities; Integrated dry needling;  Therapeutic activities     Goals: (Goals have been discussed and agreed upon with patient.)  Short-Term Functional Goals: Time Frame: 10/12/2022 to 11/9/2022  Patient demonstrates independence with home exercise program without verbal cueing provided by therapist. - Ongoing  Pt will report worst pain 3/10 or less at rest in order to return to unrestricted prolonged sitting 30 mins or greater. - Goal met 11/10/2022  Pt will report unrestricted sleeping through night 75% of week in order to progress towards sleeping habits consistent with prior level of function. - Goal met 11/10/2022  Pt will demonstrate independent supine to sit transfer using log roll technique in order to improve independence and safety with functional transfers. - Goal et 11/10/2022  Pt will demonstrate cervical extension AROM to 25 degrees or greater in order to improve independence with upward gaze activities. - Goal met 11/10/2022  Discharge Goals: Time Frame: 10/12/2022 to 12/7/2022  Pt will report worst pain 3/10 or less with prolonged standing/walking 15 mins or greater in order to return to unrestricted community distance ambulation. - Goal met 11/10/2022  Tinetti balance test score of 19/28 or greater in order to demonstrate improved static/dynamic balance and reduced falls risk. - Ongoing  Pt will demonstrate gross BLE strength to 4+/5 or greater in order to return to unrestricted stair navigation. - Ongoing  Pt will demonstrate pain free cervical rotation AROM to 55 degrees or greater bilaterally in order to return to unrestricted driving activities. - Goal met 11/10/2022  Pt will return to unrestricted walking 1 mile or greater in order to return to walking for recreational exercise. - Ongoing  Pt will demonstrate B shoulder flexion AROM to 130 degrees or greater in order to improve independence with functional reaching and lifting to overhead cabinets. - Goal met 11/10/2022. NDI score of 15/50 or less in order to demonstrate overall functional improvement. - ongoing         Outcome Measure: Tool Used: Neck Disability Index (NDI)  Score:  Initial: 24/50  Most Recent: NT/50 (Date: 11/10/2022 )   Interpretation of Score:  The Neck Disability Index is a revised form of the Oswestry Low Back Pain Index and is designed to measure the activities of daily living in person's with neck pain. Each section is scored on a 0-5 scale, 5 representing the greatest disability. The scores of each section are added together for a total score of 50. Medical Necessity:   > Patient is expected to demonstrate progress in strength, range of motion, balance, coordination, and functional technique to increase independence with community distance ambulation, ADL performance, light house work, driving and functional reaching and lifting activities. > Skilled intervention continues to be required due to decreased AROM, decreased strength, decreased balance, decreased gait, decreased functional mobility. Reason For Services/Other Comments:  > Patient continues to require skilled intervention due to increasing complexity of exercise. .  Total Duration:  Time In: 0902  Time Out: 1000    Regarding Sia Danielleyovana Hart's therapy, I certify that the treatment plan above will be carried out by a therapist or under their direction. Thank you for this referral,  Chato Mora, PT     Referring Physician Signature: Stevie Flores MD No Signature is Required for this note.         Post Session Pain  Charge Capture  PT Visit Info MD Guidelines  MyChart

## 2022-11-11 ENCOUNTER — APPOINTMENT (OUTPATIENT)
Dept: PHYSICAL THERAPY | Age: 62
End: 2022-11-11
Payer: MEDICARE

## 2022-11-13 PROBLEM — R05.9 COUGH: Status: RESOLVED | Noted: 2021-10-12 | Resolved: 2022-11-13

## 2022-11-14 ENCOUNTER — HOSPITAL ENCOUNTER (OUTPATIENT)
Dept: PHYSICAL THERAPY | Age: 62
Setting detail: RECURRING SERIES
Discharge: HOME | End: 2022-11-17
Payer: MEDICARE

## 2022-11-14 PROCEDURE — 97140 MANUAL THERAPY 1/> REGIONS: CPT

## 2022-11-14 PROCEDURE — 97110 THERAPEUTIC EXERCISES: CPT

## 2022-11-14 ASSESSMENT — PAIN SCALES - GENERAL: PAINLEVEL_OUTOF10: 6

## 2022-11-14 NOTE — PROGRESS NOTES
Audi Munson  : 1960  Primary: Medicare Part A [place] B  Secondary:  [address] @ [address] [place] [address]  Phone: [phone]  Fax: [phone] Plan Frequency: 2 visits per week for 8 weeks    Plan of Care/Certification Expiration Date: 22 (Start of [address] 10/12/2022)      PT Visit Info:  No data recorded   Visit Count:  8   OUTPATIENT PHYSICAL THERAPY:OP NOTE TYPE: Treatment Note 2022       Episode  }Appt Desk             Treatment Diagnosis:  S/p cervical discectomy (Z98.89)  S/p cervical spinal fusion (Z98.1)  Surgical follow-up care (Z09)  Neck pain (M54.2)  Medical/Referring Diagnosis:  S/P cervical discectomy [Z98.890]  S/P cervical spinal fusion [Z98.1]  Surgical follow-up care [Z09]  Referring Physician:  Ryan Rust MD MD Orders:  PT Eval and Treat   Date of Onset:  Onset Date: 22 (s/p C5-6 cervical fusion)     Allergies:   Latex, Crab extract allergy skin test, Cephalexin, Morphine, Morphine, Zolpidem, Adhesive tape, and Cephalexin  Restrictions/Precautions:  Restrictions/Precautions: Fall Risk  No data recorded   Interventions Planned (Treatment may consist of any combination of the following):    Current Treatment Recommendations: Strengthening; ROM; Balance training; Functional mobility training; Transfer training; ADL/Self-care training; Endurance training; Gait training; Stair training; Neuromuscular re-education; Manual Therapy - Soft Tissue Mobilization; Manual Therapy - Joint Manipulation; Pain management; Return to work related activity; Home exercise program; Safety education & training; Patient/Caregiver education & training; Equipment evaluation, education, & procurement; Modalities; Integrated dry needling; Therapeutic activities     Subjective Comments:  Patient states she fell backwards yesterday and landed on her back and left hip.  Reports overall increased pain and soreness mostly back and knees.No obvious signes of trauma. Informed to call MD if pain increases  Initial:}    6/10Post Session:       4/10  Medications Last Reviewed:  11/14/2022  Updated Objective Findings:  None Today  Treatment   THERAPEUTIC EXERCISE: (30 minutes):    Exercises per grid below to improve mobility, strength, balance, and coordination. Required moderate visual, verbal, manual, and tactile cues to promote proper body alignment, promote proper body poster and proper body mechanics. Progressed resistance, range, repetitions and complexity of movement as indicated. Date:  11/10/2022 Date:  11/14/22 Date:  11/8/22   Activity/Exercise Parameters Parameters Parameters   Cervical AROM Supine cervical rotation, x30 each Seated , cervical rotation, x20 each Seated , cervical rotation, x20   Chin tuck Supine, 20x5 sec Seated , 20.5sec Seated , 75g6zht   Scap retract Seated,x20 Seated x30 Seated x30   Supine pec stretch Doorway, arms low, [id number] Doorway 5 x 30 sec Verbal review   Cervical isometrics Sidebend 51o7pda    Rotation [id number] Held due to shoulder pain    Rotation 45s0use Sidebend 87s6uuj    Rotation 89v2pmp   TB Row Blue 2x10 Green    TB, 3x10 Blue  TB, 2x10   TB Shoulder extension Blue 2x10 Green  TB, 3x10 Green  TB, 2x10   TRX Flexion/extension x20     Open book --     Thoracic extension --  Swiss ball x 30   UBE   3.5 min FWD  3.5 min REV      Time spent with patient reviewing proper muscle recruitment and technique with exercises. MANUAL THERAPY: (23 minutes):   Joint mobilization and Soft tissue mobilization was utilized and necessary because of the patient's restricted joint motion, painful spasm, loss of articular motion, and restricted motion of soft tissue. - Soft tissue mobilization: bilateral upper traps/levator scap, cervical paraspinals, B SCM. emphasizing right. MODALITIES: (0 minutes):        None today. HEP: As above; handouts given to patient for all exercises.       Treatment/Session Summary:    Treatment Assessment:  Tolerated exercises well. Decreased tightness noted upper trapezius  Communication/Consultation:  None today  Equipment provided today:  None  Recommendations/Intent for next treatment session: Next visit will focus on pain control, improve cervical spine mobility and strength, improve posture, progress balance/gait/functional mobility. Total Treatment Billable Duration:  53 mins.   Time In: 0800  Time Out: 9242    OBO AKINS PTA       Charge Capture  }Post Session Pain  PT Visit Info  [employer] Portal  MD Guidelines  Scanned Media  Benefits  MyChart    Future Appointments   Date Time Provider Rosaura Montelongo   11/18/2022  9:00 AM [facility]   11/22/2022  9:00 AM [facility]   11/30/2022  9:00 AM [facility]   12/2/2022  9:00 AM [facility]   1/11/2023  1:15 PM Anne Cordero MD [facility]

## 2022-11-15 ENCOUNTER — APPOINTMENT (OUTPATIENT)
Dept: PHYSICAL THERAPY | Age: 62
End: 2022-11-15
Payer: MEDICARE

## 2022-11-18 ENCOUNTER — HOSPITAL ENCOUNTER (OUTPATIENT)
Dept: PHYSICAL THERAPY | Age: 62
Setting detail: RECURRING SERIES
Discharge: HOME | End: 2022-11-21
Payer: MEDICARE

## 2022-11-18 PROCEDURE — 97110 THERAPEUTIC EXERCISES: CPT

## 2022-11-18 PROCEDURE — 97140 MANUAL THERAPY 1/> REGIONS: CPT

## 2022-11-18 ASSESSMENT — PAIN SCALES - GENERAL: PAINLEVEL_OUTOF10: 4

## 2022-11-18 NOTE — PROGRESS NOTES
Josue Munson  : 1960  Primary: Medicare Part A [place] B  Secondary:  [address] @ [address] [address]  Phone: [phone]  Fax: [phone] Plan Frequency: 2 visits per week for 8 weeks    Plan of Care/Certification Expiration Date: 22 (Start of [address] 10/12/2022)      PT Visit Info:  No data recorded   Visit Count:  9   OUTPATIENT PHYSICAL THERAPY:OP NOTE TYPE: Treatment Note 2022       Episode  }Appt Desk             Treatment Diagnosis:  S/p cervical discectomy (Z98.89)  S/p cervical spinal fusion (Z98.1)  Surgical follow-up care (Z09)  Neck pain (M54.2)  Medical/Referring Diagnosis:  S/P cervical discectomy [Z98.890]  S/P cervical spinal fusion [Z98.1]  Surgical follow-up care [Z09]  Referring Physician:  Grey Rojo MD MD Orders:  PT Eval and Treat   Date of Onset:  Onset Date: 22 (s/p C5-6 cervical fusion)     Allergies:   Latex, Crab extract allergy skin test, Cephalexin, Morphine, Morphine, Zolpidem, Adhesive tape, and Cephalexin  Restrictions/Precautions:  Restrictions/Precautions: Fall Risk  No data recorded   Interventions Planned (Treatment may consist of any combination of the following):    Current Treatment Recommendations: Strengthening; ROM; Balance training; Functional mobility training; Transfer training; ADL/Self-care training; Endurance training; Gait training; Stair training; Neuromuscular re-education; Manual Therapy - Soft Tissue Mobilization; Manual Therapy - Joint Manipulation; Pain management; Return to work related activity; Home exercise program; Safety education & training; Patient/Caregiver education & training; Equipment evaluation, education, & procurement; Modalities; Integrated dry needling;  Therapeutic activities     Subjective Comments:  Patient reports increased cervcial pain past few days  Initial:}    4/10Post Session:       2/10  Medications Last Reviewed:  2022  Updated Objective Findings:  None Today  Treatment   THERAPEUTIC EXERCISE: (30 minutes):    Exercises per grid below to improve mobility, strength, balance, and coordination. Required moderate visual, verbal, manual, and tactile cues to promote proper body alignment, promote proper body poster and proper body mechanics. Progressed resistance, range, repetitions and complexity of movement as indicated. Date:  11/10/2022 Date:  11/14/22 Date:  11/18/22   Activity/Exercise Parameters Parameters Parameters   Cervical AROM Supine cervical rotation, x30 each Seated , cervical rotation, x20 each Seated , cervical rotation, x20   Chin tuck Supine, 20x5 sec Seated , 20.5sec Seated , 04x0stf   Scap retract Seated,x20 Seated x30 Seated x30   Supine pec stretch Doorway, arms low, 2f40znu Doorway 5 x 30 sec Verbal review   Cervical isometrics Sidebend 71h1the    Rotation 39o7vsm Held due to shoulder pain    Rotation 87p7okl    TB Row Blue 2x10 Green    TB, 3x10 Green   TB, 2x15   TB Shoulder extension Blue 2x10 Green  TB, 3x10 Green  TB, 2x10   TRX Flexion/extension x20     Open book --     Thoracic extension --  Swiss ball x 30   UBE   3.5 min FWD  3.5 min REV 4 min FWD  4 min REV     Time spent with patient reviewing proper muscle recruitment and technique with exercises. MANUAL THERAPY: (25 minutes):   Joint mobilization and Soft tissue mobilization was utilized and necessary because of the patient's restricted joint motion, painful spasm, loss of articular motion, and restricted motion of soft tissue. - Soft tissue mobilization: bilateral upper traps/levator scap, cervical paraspinals, B SCM. emphasizing right. MODALITIES: (0 minutes):        None today. HEP: As above; handouts given to patient for all exercises.       Treatment/Session Summary:    Treatment Assessment:  Decreased pain and tightness after manual therapy  Communication/Consultation:  None today  Equipment provided today:  None  Recommendations/Intent for next treatment session: Next visit will focus on pain control, improve cervical spine mobility and strength, improve posture, progress balance/gait/functional mobility. Total Treatment Billable Duration:  55 mins.   Time In: 0901  Time Out: 0959    BOO AKINS PTA       Charge Capture  }Post Session Pain  PT Visit Info  [employer] Portal  MD Guidelines  Scanned Media  Benefits  MyChart    Future Appointments   Date Time Provider Rosaura Montelongo   11/22/2022  9:00 AM Jimmy Cox PTA [facility]   11/30/2022  9:00 AM [facility]   12/2/2022  9:00 AM [facility]   1/11/2023  1:15 PM Kushal Currie MD [facility]

## 2022-11-22 ENCOUNTER — HOSPITAL ENCOUNTER (OUTPATIENT)
Dept: PHYSICAL THERAPY | Age: 62
Setting detail: RECURRING SERIES
Discharge: HOME | End: 2022-11-25
Payer: MEDICARE

## 2022-11-22 PROCEDURE — 97140 MANUAL THERAPY 1/> REGIONS: CPT

## 2022-11-22 PROCEDURE — 97110 THERAPEUTIC EXERCISES: CPT

## 2022-11-22 ASSESSMENT — PAIN SCALES - GENERAL: PAINLEVEL_OUTOF10: 3

## 2022-11-22 NOTE — PROGRESS NOTES
Shania Munson  : 1960  Primary: Medicare Part A [place] B  Secondary:  [address] @ [address] [place] [address]  Phone: [phone]  Fax: [phone] Plan Frequency: 2 visits per week for 8 weeks    Plan of Care/Certification Expiration Date: 22 (Start of [address] 10/12/2022)      PT Visit Info:  No data recorded   Visit Count:  10   OUTPATIENT PHYSICAL THERAPY:OP NOTE TYPE: Treatment Note 2022       Episode  }Appt Desk             Treatment Diagnosis:  S/p cervical discectomy (Z98.89)  S/p cervical spinal fusion (Z98.1)  Surgical follow-up care (Z09)  Neck pain (M54.2)  Medical/Referring Diagnosis:  S/P cervical discectomy [Z98.890]  S/P cervical spinal fusion [Z98.1]  Surgical follow-up care [Z09]  Referring Physician:  Katherine Schulz MD MD Orders:  PT Eval and Treat   Date of Onset:  Onset Date: 22 (s/p C5-6 cervical fusion)     Allergies:   Latex, Crab extract allergy skin test, Cephalexin, Morphine, Morphine, Zolpidem, Adhesive tape, and Cephalexin  Restrictions/Precautions:  Restrictions/Precautions: Fall Risk  No data recorded   Interventions Planned (Treatment may consist of any combination of the following):    Current Treatment Recommendations: Strengthening; ROM; Balance training; Functional mobility training; Transfer training; ADL/Self-care training; Endurance training; Gait training; Stair training; Neuromuscular re-education; Manual Therapy - Soft Tissue Mobilization; Manual Therapy - Joint Manipulation; Pain management; Return to work related activity; Home exercise program; Safety education & training; Patient/Caregiver education & training; Equipment evaluation, education, & procurement; Modalities; Integrated dry needling; Therapeutic activities     Subjective Comments:  Patient reports minimal neck pain today. Main complaint is bilateral knee since recent fall.   Initial:}    3/10Post Session: 2/10  Medications Last Reviewed:  11/22/2022  Updated Objective Findings:  None Today  Treatment   THERAPEUTIC EXERCISE: (15 minutes):  decreased exercises today due to knee pain and feeling unsteady standing  Exercises per grid below to improve mobility, strength, balance, and coordination. Required moderate visual, verbal, manual, and tactile cues to promote proper body alignment, promote proper body poster and proper body mechanics. Progressed resistance, range, repetitions and complexity of movement as indicated. Date:  1122/2022 Date:  11/14/22 Date:  11/18/22   Activity/Exercise Parameters Parameters Parameters   Cervical AROM Seated  cervical rotation, x30 each Seated , cervical rotation, x20 each Seated , cervical rotation, x20   Chin tuck  Seated , 20.5sec Seated , 17h3oyl   Scap retract Seated,x20 Seated x30 Seated x30   Supine pec stretch  Doorway 5 x 30 sec Verbal review   Cervical isometrics  Held due to shoulder pain    Rotation 63h0jhr    TB Row  Green    TB, 3x10 Green   TB, 2x15   TB Shoulder extension  Green  TB, 3x10 Green  TB, 2x10   TRX      Open book --     Thoracic extension --  Swiss ball x 30   UBE  4 min FWD  4 min REV 3.5 min FWD  3.5 min REV 4 min FWD  4 min REV     Time spent with patient reviewing proper muscle recruitment and technique with exercises. MANUAL THERAPY: (30 minutes):   Joint mobilization and Soft tissue mobilization was utilized and necessary because of the patient's restricted joint motion, painful spasm, loss of articular motion, and restricted motion of soft tissue. - Soft tissue mobilization: bilateral upper traps/levator scap, cervical paraspinals, B SCM. emphasizing right. MODALITIES: (0 minutes):        None today. HEP: As above; handouts given to patient for all exercises.       Treatment/Session Summary:    Treatment Assessment:  Improved cervical ROM and decreased tightness  Communication/Consultation:  None today  Equipment provided today: None  Recommendations/Intent for next treatment session: Next visit will focus on pain control, improve cervical spine mobility and strength, improve posture, progress balance/gait/functional mobility. Total Treatment Billable Duration:  45 mins.   Time In: 0900  Time Out: 0954    BOO AKINS PTA       Charge Capture  }Post Session Pain  PT Visit Info  [employer] Portal  MD Guidelines  Scanned Media  Benefits  MyChart    Future Appointments   Date Time Provider Rosaura Jayda   11/30/2022  9:00 AM David Nath, PT [facility]   12/2/2022  9:00 AM David Nath, PT [facility]   1/11/2023  1:15 PM Bette Covarrubias MD [facility]

## 2022-11-29 ENCOUNTER — APPOINTMENT (OUTPATIENT)
Dept: PHYSICAL THERAPY | Age: 62
End: 2022-11-29
Payer: MEDICARE

## 2022-11-30 ENCOUNTER — HOSPITAL ENCOUNTER (OUTPATIENT)
Dept: PHYSICAL THERAPY | Age: 62
Setting detail: RECURRING SERIES
Discharge: HOME | End: 2022-12-03
Payer: MEDICARE

## 2022-11-30 PROCEDURE — 97140 MANUAL THERAPY 1/> REGIONS: CPT

## 2022-11-30 PROCEDURE — 97110 THERAPEUTIC EXERCISES: CPT

## 2022-11-30 ASSESSMENT — PAIN SCALES - GENERAL: PAINLEVEL_OUTOF10: 5

## 2022-11-30 NOTE — PROGRESS NOTES
Jude Munson  : 1960  Primary: Medicare Part A And B  Secondary:  [address] @ [address] [place] [address]  Phone: [phone]  Fax: [phone] Plan Frequency: 2 visits per week for 8 weeks    Plan of Care/Certification Expiration Date: 22 (Start of [address] 10/12/2022)      PT Visit Info:  No data recorded   Visit Count:  11   OUTPATIENT PHYSICAL UZSCMLX:EG NOTE TYPE: Treatment Note 2022       Episode  }Appt Desk             Treatment Diagnosis:  S/p cervical discectomy (Z98.89)  S/p cervical spinal fusion (Z98.1)  Surgical follow-up care (Z09)  Neck pain (M54.2)  Medical/Referring Diagnosis:  S/P cervical discectomy [Z98.890]  S/P cervical spinal fusion [Z98.1]  Surgical follow-up care [Z09]  Referring Physician:  Eva Escobar MD MD Orders:  PT Eval and Treat   Date of Onset:  Onset Date: 22 (s/p C5-6 cervical fusion)     Allergies:   Latex, Crab extract allergy skin test, Cephalexin, Morphine, Morphine, Zolpidem, Adhesive tape, and Cephalexin  Restrictions/Precautions:  Restrictions/Precautions: Fall Risk  No data recorded   Interventions Planned (Treatment may consist of any combination of the following):    Current Treatment Recommendations: Strengthening; ROM; Balance training; Functional mobility training; Transfer training; ADL/Self-care training; Endurance training; Gait training; Stair training; Neuromuscular re-education; Manual Therapy - Soft Tissue Mobilization; Manual Therapy - Joint Manipulation; Pain management; Return to work related activity; Home exercise program; Safety education & training; Patient/Caregiver education & training; Equipment evaluation, education, & procurement; Modalities; Integrated dry needling;  Therapeutic activities     Subjective Comments:  Patient reports increased neck pain after going out of town for weekend and riding in sports car  Initial:}    5/10Post Session: 2/10  Medications Last Reviewed:  11/30/2022  Updated Objective Findings:  None Today  Treatment   THERAPEUTIC EXERCISE: (30 minutes):  decreased exercises today due to knee pain and feeling unsteady standing  Exercises per grid below to improve mobility, strength, balance, and coordination. Required moderate visual, verbal, manual, and tactile cues to promote proper body alignment, promote proper body poster and proper body mechanics. Progressed resistance, range, repetitions and complexity of movement as indicated. Date:  1122/2022 Date:  11/30/22 Date:  11/18/22   Activity/Exercise Parameters Parameters Parameters   Cervical AROM Seated  cervical rotation, x30 each Seated , cervical rotation, x20 each Seated , cervical rotation, x20   Chin tuck  Seated , 20.5sec Seated , [id number]   Scap retract Seated,x20 Seated x30 Seated x30   Supine pec stretch  Doorway 5 x 30 sec Verbal review   Cervical isometrics  Flexion/extension/ B rotation x 5     TB Row  Green    TB, 3x12 Green   TB, 2x15   TB Shoulder extension  Green  TB, 3x12 Green  TB, 2x10   TRX      Open book --     Thoracic extension --  Swiss ball x 30   UBE  4 min FWD  4 min REV 4 min FWD  4 min REV 4 min FWD  4 min REV     Time spent with patient reviewing proper muscle recruitment and technique with exercises. MANUAL THERAPY: (23 minutes):   Joint mobilization and Soft tissue mobilization was utilized and necessary because of the patient's restricted joint motion, painful spasm, loss of articular motion, and restricted motion of soft tissue. - Soft tissue mobilization: bilateral upper traps/levator scap, cervical paraspinals, B SCM. emphasizing right. MODALITIES: (0 minutes):        None today. HEP: As above; handouts given to patient for all exercises.       Treatment/Session Summary:    Treatment Assessment:  Decreased pain and tightness after session  Communication/Consultation:  None today  Equipment provided today: None  Recommendations/Intent for next treatment session: Next visit will focus on pain control, improve cervical spine mobility and strength, improve posture, progress balance/gait/functional mobility. Total Treatment Billable Duration:  53 mins.   Time In: 6724  Time Out: 0950    BOO AKINS PTA       Charge Capture  }Post Session Pain  PT Visit Info  [employer] Portal  MD Guidelines  Scanned Media  Benefits  MyChart    Future Appointments   Date Time Provider Rosaura Jayda   12/2/2022  9:00 AM Anaya Green PT [facility]   1/11/2023  1:15 PM Mandie Gipson MD [facility]

## 2022-12-02 ENCOUNTER — HOSPITAL ENCOUNTER (OUTPATIENT)
Dept: PHYSICAL THERAPY | Age: 62
Setting detail: RECURRING SERIES
Discharge: HOME | End: 2022-12-05
Payer: MEDICARE

## 2022-12-02 PROCEDURE — 97140 MANUAL THERAPY 1/> REGIONS: CPT

## 2022-12-02 PROCEDURE — 97110 THERAPEUTIC EXERCISES: CPT

## 2022-12-02 ASSESSMENT — PAIN SCALES - GENERAL: PAINLEVEL_OUTOF10: 3

## 2022-12-02 NOTE — PROGRESS NOTES
Rob Olga  : 1960  Primary: Medicare Part A And B  Secondary:  [address] @ [address] [place] [id number]  Phone: [phone]  Fax: [phone] Plan Frequency: 2 visits per week for 8 weeks    Plan of Care/Certification Expiration Date: 22 (Start of [address] 10/12/2022)      PT Visit Info:  No data recorded   Visit Count:  12   OUTPATIENT PHYSICAL THERAPY:OP NOTE TYPE: Treatment Note 2022       Episode  }Appt Desk             Treatment Diagnosis:  S/p cervical discectomy (Z98.89)  S/p cervical spinal fusion (Z98.1)  Surgical follow-up care (Z09)  Neck pain (M54.2)  Medical/Referring Diagnosis:  S/P cervical discectomy [Z98.890]  S/P cervical spinal fusion [Z98.1]  Surgical follow-up care [Z09]  Referring Physician:  Lizzette Phan MD MD Orders:  PT Eval and Treat   Date of Onset:  Onset Date: 22 (s/p C5-6 cervical fusion)     Allergies:   Latex, Crab extract allergy skin test, Cephalexin, Morphine, Morphine, Zolpidem, Adhesive tape, and Cephalexin  Restrictions/Precautions:  Restrictions/Precautions: Fall Risk  No data recorded   Interventions Planned (Treatment may consist of any combination of the following):    Current Treatment Recommendations: Strengthening; ROM; Balance training; Functional mobility training; Transfer training; ADL/Self-care training; Endurance training; Gait training; Stair training; Neuromuscular re-education; Manual Therapy - Soft Tissue Mobilization; Manual Therapy - Joint Manipulation; Pain management; Return to work related activity; Home exercise program; Safety education & training; Patient/Caregiver education & training; Equipment evaluation, education, & procurement; Modalities; Integrated dry needling; Therapeutic activities     Subjective Comments:  Pt reports overall pain levels are well controlled at this time; intermittent soreness related to activity levels.   Initial:}    3/10Post Session: 1/10  Medications Last Reviewed:  12/2/2022  Updated Objective Findings:   See discharge note dated 12/2/2022. Treatment   THERAPEUTIC EXERCISE: (30 minutes):  decreased exercises today due to knee pain and feeling unsteady standing  Exercises per grid below to improve mobility, strength, balance, and coordination. Required moderate visual, verbal, manual, and tactile cues to promote proper body alignment, promote proper body poster and proper body mechanics. Progressed resistance, range, repetitions and complexity of movement as indicated. Date:  1122/2022 Date:  11/30/22 Date:  12/2/22   Activity/Exercise Parameters Parameters Parameters   Cervical AROM Seated  cervical rotation, x30 each Seated , cervical rotation, x20 each Seated , cervical rotation, x20   Chin tuck  Seated , 20.5sec Seated , [id number]   Scap retract Seated,x20 Seated x30 Seated x30   Supine pec stretch  Doorway 5 x 30 sec Verbal review   Cervical isometrics  Flexion/extension/ B rotation x 5  Flexion/extension/ B rotation x 5    TB Row  Green    TB, 3x12 Blue   TB, 2x15   TB Shoulder extension  Green  TB, 3x12 Green  TB, 2x10   TRX   --   Open book --  --   Thoracic extension --  Verbal review   UBE  4 min FWD  4 min REV 4 min FWD  4 min REV 3 min FWD  3 min REV     Time spent with patient reviewing proper muscle recruitment and technique with exercises. MANUAL THERAPY: (25 minutes):   Joint mobilization and Soft tissue mobilization was utilized and necessary because of the patient's restricted joint motion, painful spasm, loss of articular motion, and restricted motion of soft tissue. - Soft tissue mobilization: bilateral upper traps/levator scap, cervical paraspinals, B SCM. emphasizing right. MODALITIES: (0 minutes):        None today. HEP: As above; handouts given to patient for all exercises.       Treatment/Session Summary:    Treatment Assessment:  Pt demonstrates excellent understanding of HEP exercises at this time.  Communication/Consultation:  None today  Equipment provided today:  None  Recommendations/Intent for next treatment session: Discharge to [facility] , see discharge note dated 12/2/2022. Total Treatment Billable Duration:  55 mins.   Time In: 6036  Time Out: [facility], PT       Charge Capture  }Post Session Pain  PT Visit 0 [facility] Portal  MD Guidelines  Scanned Media  Benefits  MyChart    Future Appointments   Date Time Provider Rosaura Jayda   1/11/2023  1:15 PM Manjinder Norris MD [facility]

## 2022-12-02 NOTE — THERAPY DISCHARGE
Shania Munson  : 1960  Primary:   Secondary:  [address] @ [address] [place] [id number]  Phone: [phone]  Fax: [phone] Plan Frequency: 2 visits per week for 8 weeks  Plan of Care/Certification Expiration Date: 22 (Start of [address] 10/12/2022)      PT Visit Info:         Visit Count:  12    OUTPATIENT PHYSICAL THERAPY:OP NOTE TYPE: Discharge Summary 2022               Episode  Appt Desk         Treatment Diagnosis:  S/p cervical discectomy (Z98.89)  S/p cervical spinal fusion (Z98.1)  Surgical follow-up care (Z09)  Neck pain (M54.2)  Medical/Referring Diagnosis:  S/P cervical discectomy [Z98.890]  S/P cervical spinal fusion [Z98.1]  Surgical follow-up care [Z09]  Referring Physician:  Katherine Schulz MD MD Orders:  PT Eval and Treat  Return MD Appt:  2023  Date of Onset:  Onset Date: 22 (s/p C5-6 cervical fusion)     Allergies:  Latex, Crab extract allergy skin test, Cephalexin, Morphine, Morphine, Zolpidem, Adhesive tape, and Cephalexin  Restrictions/Precautions:    Restrictions/Precautions: Fall Risk      Medications Last Reviewed:  2022      OBJECTIVE   Patient denies any increase of symptoms with coughing, sneezing or valsalva maneuver. Patient denies  any headaches, changes in vision, dizziness, vertigo, nausea, drop attacks, black outs, tinnitus, dysphagia, dysarthria. Observation/Orthostatic Postural Assessment: Posture: Good, normal seated posture observed.; Incision: healed, no signs of infection. Palpation: Mild tenderness to palpation upper trap and scalenes mostly right side of neck. Vertebral-Basilar Screen: Unremarkable.     ROM:   Cervical extension (degrees): 35°   Cervical flexion: 43°   Cervical left side bend: 20°   Cervical right side bend: 23°   Cervical left rotation: 60°   Cervical right rotation: 62°   Shoulder flexion Right 150   Shoulder flexion left 155     Strength: gross BUE 4 to 4+/5, Gross BLE 4 to 4+/5    Joint Mobility: NT today due to high symptom irritability. Special Tests: NT due to post op. Neurological Screen:  Myotomes: Key muscle strength testing for bilateral UE is WNL. Dermatomes: Sensation testing through bilateral upper quadrants for light touch is diminished C5-6 right UE. Reflexes: Biceps (C5), brachioradialis (C6), and triceps (C7) are Normal and Normal.   Neural tension tests: Upper limb tension test is NT. Slump test is NT. Functional Mobility:  Sit to stand: BUE assist, good control of trunk with BLEs  Supine to sit: Independent with use of log roll technique    Balance Testing:  Tinetti balance test: 19/28    ASSESSMENT   Initial Assessment:  Mrs. Theo Porter is a 62 y/o female with complaints of neck pain/stiffness and balance/gait difficulties following C5-6 cervical fusion surgery on 4/12/2022. Pt reports no complications since surgery however has noticed some continued muscle weakness in BUEs/BLEs as well as intermittent tingling in RUE and LLE. Pt reports some difficulty with balance and gait and reports fall 4 days ago without injury when getting out of her shower. Pt ambulating mostly with SPC at this time however she does have rolator walker if needed for longer distances. Pt denies dizziness/headache/vision changes. Pt reports bladder dysfunction unrelated to current episode of care that is being treated with spine stimulator. Pt referred by MD for PT eval and treat. Pt will benefit from skilled PT treatment to address deficits in strength, AROM, balance, gait and functional mobility in order to return to prior level of function and improve quality of life. DISCHARGE NOTE 12/2/2022: Pt has completed 12 PT treatment sessions from 10/12/22 to 12/2/22. Pt has made excellent overall progress with 11 of 12 PT goals met at this time.  Pt demonstrates independence with HEP exercise at this time and verbalizes confidence she can continue independently with HEP. Pt to be discharged from PT treatment, pt verbalized agreement with plan. Problem List: (Impacting functional limitations): Body Structures, Functions, Activity Limitations Requiring Skilled Therapeutic Intervention: Decreased functional mobility ; Decreased ADL status; Decreased ROM; Decreased body mechanics; Decreased tolerance to work activity; Decreased strength; Decreased endurance; Decreased sensation; Decreased balance; Decreased coordination; Increased pain; Decreased posture     Therapy Prognosis:   Therapy Prognosis: Good     Assessment Complexity:      PLAN   Effective Dates: 10/12/2022 TO Plan of Care/Certification Expiration Date: 12/07/22 (Start of POC 10/12/2022)     Frequency/Duration: Plan Frequency: 2 visits per week for 8 weeks     Interventions Planned (Treatment may consist of any combination of the following):    Current Treatment Recommendations: Strengthening; ROM; Balance training; Functional mobility training; Transfer training; ADL/Self-care training; Endurance training; Gait training; Stair training; Neuromuscular re-education; Manual Therapy - Soft Tissue Mobilization; Manual Therapy - Joint Manipulation; Pain management; Return to work related activity; Home exercise program; Safety education & training; Patient/Caregiver education & training; Equipment evaluation, education, & procurement; Modalities; Integrated dry needling;  Therapeutic activities     Goals: (Goals have been discussed and agreed upon with patient.)  Short-Term Functional Goals: Time Frame: 10/12/2022 to 11/9/2022  Patient demonstrates independence with home exercise program without verbal cueing provided by therapist. - Goal met 12/2/2022  Pt will report worst pain 3/10 or less at rest in order to return to unrestricted prolonged sitting 30 mins or greater. - Goal met 11/10/2022  Pt will report unrestricted sleeping through night 75% of week in order to progress towards sleeping habits consistent with prior level of function. - Goal met 11/10/2022  Pt will demonstrate independent supine to sit transfer using log roll technique in order to improve independence and safety with functional transfers. - Goal met 11/10/2022  Pt will demonstrate cervical extension AROM to 25 degrees or greater in order to improve independence with upward gaze activities. - Goal met 11/10/2022  Discharge Goals: Time Frame: 10/12/2022 to 12/7/2022  Pt will report worst pain 3/10 or less with prolonged standing/walking 15 mins or greater in order to return to unrestricted community distance ambulation. - Goal met 11/10/2022  Tinetti balance test score of 19/28 or greater in order to demonstrate improved static/dynamic balance and reduced falls risk. - Goal met 12/2/2022  Pt will demonstrate gross BLE strength to 4+/5 or greater in order to return to unrestricted stair navigation. - Ongoing  Pt will demonstrate pain free cervical rotation AROM to 55 degrees or greater bilaterally in order to return to unrestricted driving activities. - Goal met 11/10/2022  Pt will return to unrestricted walking 1 mile or greater in order to return to walking for recreational exercise. - Goal met 12/2/2022  Pt will demonstrate B shoulder flexion AROM to 130 degrees or greater in order to improve independence with functional reaching and lifting to overhead cabinets. - Goal met 11/10/2022. NDI score of 15/50 or less in order to demonstrate overall functional improvement. - Goal met 12/2/2022         Outcome Measure: Tool Used: Neck Disability Index (NDI)  Score:  Initial: 24/50  Most Recent: 11/50 (Date: 12/2/2022 )   Interpretation of Score: The Neck Disability Index is a revised form of the Oswestry Low Back Pain Index and is designed to measure the activities of daily living in person's with neck pain. Each section is scored on a 0-5 scale, 5 representing the greatest disability. The scores of each section are added together for a total score of 50. Total Duration:  Time In: 0909  Time Out: 200    Regarding Cande Hart's therapy, I certify that the treatment plan above will be carried out by a therapist or under their direction. Thank you for this referral,  Kristin Mueller PT     Referring Physician Signature: Elaine Wood MD No Signature is Required for this note.         Post Session Pain  Charge Capture  PT Visit Info MD Guidelines  MyChart

## 2022-12-06 ENCOUNTER — APPOINTMENT (OUTPATIENT)
Dept: PHYSICAL THERAPY | Age: 62
End: 2022-12-06
Payer: MEDICARE

## 2023-01-09 ENCOUNTER — APPOINTMENT (OUTPATIENT)
Dept: PHYSICAL THERAPY | Age: 63
End: 2023-01-09
Payer: MEDICARE

## 2023-01-10 PROBLEM — K44.9 HIATAL HERNIA: Status: ACTIVE | Noted: 2022-10-30

## 2023-01-10 PROBLEM — N32.89 BLADDER SPASM: Status: ACTIVE | Noted: 2022-10-30

## 2023-01-10 PROBLEM — R33.9 INCOMPLETE EMPTYING OF BLADDER: Status: ACTIVE | Noted: 2022-10-30

## 2023-01-10 PROBLEM — N39.41 URGE INCONTINENCE OF URINE: Status: ACTIVE | Noted: 2022-10-30

## 2023-01-10 PROBLEM — R15.9 INCONTINENCE OF FECES: Status: ACTIVE | Noted: 2022-10-30

## 2023-01-10 PROBLEM — N39.0 URINARY TRACT INFECTION: Status: ACTIVE | Noted: 2022-10-30

## 2023-01-12 ENCOUNTER — HOSPITAL ENCOUNTER (OUTPATIENT)
Dept: PHYSICAL THERAPY | Age: 63
Setting detail: RECURRING SERIES
Discharge: HOME | End: 2023-01-15
Payer: MEDICARE

## 2023-01-12 PROCEDURE — 97161 PT EVAL LOW COMPLEX 20 MIN: CPT

## 2023-01-12 PROCEDURE — 97110 THERAPEUTIC EXERCISES: CPT

## 2023-01-12 ASSESSMENT — PAIN SCALES - GENERAL: PAINLEVEL_OUTOF10: 7

## 2023-01-12 NOTE — PROGRESS NOTES
Nilsa Burnett  : 1960  Primary: Medicare Part A And B (Medicare)  Secondary: [place] @ [address] [facility] [address]  Phone: [phone]  Fax: [phone] Plan Frequency: 1-2 visits per week for 6 weeks    Plan of Care/Certification Expiration Date: 23 (Start of [address] 2023)      PT Visit Info:  Plan Frequency: 1-2 visits per week for 6 weeks  Plan of Care/Certification Expiration Date: 23 (Start of [address] 2023)      Visit Count:  1    OUTPATIENT PHYSICAL THERAPY:OP NOTE TYPE: Treatment Note 2023       Episode  }Appt Desk             Treatment Diagnosis:  Right knee pain (M25.561)  Right knee stiffness (M25.661)  Muscle weakness, generalized (M62.81)  Difficulty walking, not elsewhere classified (R26.2)  Medical/Referring Diagnosis:  Unilateral primary osteoarthritis, right knee [M17.11]  Referring Physician:  SASHA Elizondo MD Orders:  PT Eval and Treat   Date of Onset:  Onset Date: 22 (Chronic right knee pain with exacerbation approximately 6 months ago.)     Allergies:   Crab extract allergy skin test, Cephalexin, Morphine, Morphine, Oxycodone-acetaminophen, Zolpidem, Adhesive tape, and Cephalexin  Restrictions/Precautions:  Restrictions/Precautions: Fall Risk  No data recorded     Interventions Planned (Treatment may consist of any combination of the following):    Current Treatment Recommendations: Strengthening; ROM; Balance training; Functional mobility training; Transfer training; ADL/Self-care training; Endurance training; Gait training; Stair training; Neuromuscular re-education; Manual Therapy - Soft Tissue Mobilization; Manual Therapy - Joint Manipulation; Pain management; Return to work related activity; Home exercise program; Safety education & training; Patient/Caregiver education & training; Equipment evaluation, education, & procurement; Modalities;  Integrated dry needling; Therapeutic activities     Subjective Comments:  Pt reports knee pain has increased since recent travels overseas which involved increased ambulation and stair climbing activities during site seeing. Initial:}    7/10Post Session:       5/10  Medications Last Reviewed:  1/12/2023  Updated Objective Findings:  See evaluation note from today  Treatment   THERAPEUTIC EXERCISE: (30 minutes):    Exercises per grid below to improve mobility, strength, balance, and coordination. Required moderate visual, verbal, manual, and tactile cues to promote proper body alignment, promote proper body poster and proper body mechanics. Progressed resistance, range, repetitions and complexity of movement as indicated. Date:  1/12/2023 Date:   Date:     Activity/Exercise Parameters Parameters Parameters   Quad set [id number]     Heel slide Supine with strap, x10     SLR x10     Hamstring stretch [id number]     IT band stretch [id number]     Clamshell Left sidelying x15                 PROM Manual right knee flexion/extension 3 mins       Time spent with patient reviewing proper muscle recruitment and technique with exercises. MANUAL THERAPY: (0 minutes):   Joint mobilization and Soft tissue mobilization was utilized and necessary because of the patient's restricted joint motion, painful spasm, loss of articular motion, and restricted motion of soft tissue. - none today    MODALITIES: (0 minutes):        None today. HEP: As above; handouts given to patient for all exercises. Treatment/Session Summary:    Treatment Assessment:  Fair tolerance to exercises performed; pt demonstrate good understanding of exercise insturction by end of visit. Communication/Consultation:   HEP handout provided.   Equipment provided today:  None  Recommendations/Intent for next treatment session: Next visit will focus on pain/swelling control, improve right knee ROM, improve RLE strength, progress balance/gait/functional mobility; focus on HEP progression and instruction. Total Treatment Billable Duration:  45 minutes, 15 min eval, 30 min therex.   Time In: 1232  Time Out: [address], PT       Charge Capture  }Post Session Pain  PT Visit Info  [employer] Portal  MD Guidelines  Scanned Media  Benefits  MyChart    Future Appointments   Date Time Provider Rosaura Jayda   1/16/2023 11:00 AM SASHA Garcia [facility]. [facility]   1/18/2023 10:00 AM Thomas Denver, PT [facility]   1/24/2023  9:00 AM Thomas Denver, PT [facility]   1/31/2023 10:00 AM Thomas Denver, PT [facility]   2/1/2023 11:45 AM Elvira Johns MD [facility]

## 2023-01-12 NOTE — THERAPY EVALUATION
Aaliyah Phyllis  : 1960  Primary: Medicare Part A And B (Medicare)  Secondary: Moise Johnson @ [address] [facility] [address]  Phone: [phone]  Fax: [phone] Plan Frequency: 1-2 visits per week for 6 weeks    Plan of Care/Certification Expiration Date: 23 (Start of [address] 2023)      PT Visit Info:    Plan Frequency: 1-2 visits per week for 6 weeks  Plan of Care/Certification Expiration Date: 23 (Start of POC 2023)      Visit Count:  1                OUTPATIENT PHYSICAL THERAPY:             OP NOTE TYPE: Initial Assessment 2023               Episode  Appt Desk         Treatment Diagnosis:  Right knee pain (M25.561)  Right knee stiffness (M25.661)  Muscle weakness, generalized (M62.81)  Difficulty walking, not elsewhere classified (R26.2)  Medical/Referring Diagnosis:  Unilateral primary osteoarthritis, right knee [M17.11]  Referring Physician:  SASHA Kramer MD Orders:  PT Eval and Treat  Return MD Appt:  TBD by patient  Date of Onset:  Onset Date: 22 (Chronic right knee pain with exacerbation approximately 6 months ago.)      Allergies:  Crab extract allergy skin test, Cephalexin, Morphine, Morphine, Oxycodone-acetaminophen, Zolpidem, Adhesive tape, and Cephalexin  Restrictions/Precautions:    Restrictions/Precautions: Fall Risk        Medications Last Reviewed:  2023     SUBJECTIVE   History of Injury/Illness (Reason for Referral):  Mrs. Benja Harrington is a 62 y/o female with complaints of right knee pain and difficulty walking secondary to MD diagnosed primary osteoarthritis of right knee joint. Pt reports has discussed treatment options and likely patient will be undergoing total knee arthroplasty later year per MD recommendation. Pt states primary goal of PT treatment is for pre op strengthening and HEP instruction prior to undergoing surgical intervention.  Pt referred by MD for PT eval and treat. Pt will benefit from skilled PT treatment to address deficits in strength, AROM, balance, gait and functional mobility in order to return to prior level of function and improve quality of life. Patient Stated Goal(s):  \"Get stronger before knee replacement surgery\"  Initial:     7/10 Post Session:     5/10  Past Medical History/Comorbidities:   Ms. Boo Head  has a past medical history of Anemia, Arrhythmia, Arthritis, Asthma, Chronic pain, COVID, Depression, Diabetes ([address] GERD (gastroesophageal reflux disease), Headache, Hypercholesterolemia, Hypertension, Lymphadenopathy, Nausea & vomiting, Neurogenic bladder, Psychotic disorder ([address] PUD (peptic ulcer disease), and Sleep apnea. Ms. Boo Head  has a past surgical history that includes Colonoscopy; Urological Surgery; gyn; heent; Rotator cuff repair (Left); cyst removal; Shoulder arthroscopy (Right); Upper gastrointestinal endoscopy; cervical fusion (04/12/2022); and orthopedic surgery (Bilateral). Social History/Living Environment:   Lives With: Spouse  Type of Home: House  Home Layout: Two level     Prior Level of Function/Work/Activity:   Prior level of function: Independent         Learning:   Does the patient/guardian have any barriers to learning?: No barriers  Will there be a co-learner?: No  What is the preferred language of the patient/guardian?: English  Is an  required?: No  How does the patient/guardian prefer to learn new concepts?: Listening; Reading; Demonstration; Pictures/Videos     Fall Risk Scale: Degroot Total Score: 50  Degroot Fall Risk: High (45 and higher)         OBJECTIVE   Observation/Postural and Gait Assessment: Gait: Antalgic gait pattern RLE with use of single point cane; Posture: Knee valgus observed bilaterally in static stance. Palpation: Tenderness to lateral patellar retinaculum, IT band and lateral knee joint line.     ROM:     AROM(PROM) Left Right   Knee flexion 115° 131°   Knee extension 0° 0° Passive Accessory Mobility Testing: Patellar hypomobility primarily in superior direction. Strength:       Manual Muscle Test (out of 5) Left Right   Knee extension 4+ 4   Knee flexion 4- 4-   Hip abduction 4- 3+     Special Tests:  + Jhony's, + Pj, - ligamentous testing, - kary's    Neurological Screen: Unremarkable. Functional Mobility:  Sit to stand: Increased trunk flexion during sit to stand transition. Supine to sit: Independent with use of log roll technique, slow movement speed. ASSESSMENT   Initial Assessment:  Mrs. Chloe Portillo is a 62 y/o female with complaints of right knee pain and difficulty walking secondary to MD diagnosed primary osteoarthritis of right knee joint. Pt reports has discussed treatment options and likely patient will be undergoing total knee arthroplasty later year per MD recommendation. Pt states primary goal of PT treatment is for pre op strengthening and HEP instruction prior to undergoing surgical intervention. Pt referred by MD for PT eval and treat. Pt will benefit from skilled PT treatment to address deficits in strength, AROM, balance, gait and functional mobility in order to return to prior level of function and improve quality of life. Problem List: (Impacting functional limitations): Body Structures, Functions, Activity Limitations Requiring Skilled Therapeutic Intervention: Decreased functional mobility ; Decreased ADL status; Decreased ROM; Decreased body mechanics; Decreased tolerance to work activity; Decreased strength; Decreased endurance; Decreased sensation; Decreased balance; Decreased coordination;  Increased pain; Decreased posture     Therapy Prognosis:   Therapy Prognosis: Good     Initial Assessment Complexity:   Decision Making: Low Complexity    PLAN   Effective Dates: 1/12/2023 TO Plan of Care/Certification Expiration Date: 02/23/23 (Start of POC 1/12/2023)     Frequency/Duration: Plan Frequency: 1-2 visits per week for 6 weeks Interventions Planned (Treatment may consist of any combination of the following):    Current Treatment Recommendations: Strengthening; ROM; Balance training; Functional mobility training; Transfer training; ADL/Self-care training; Endurance training; Gait training; Stair training; Neuromuscular re-education; Manual Therapy - Soft Tissue Mobilization; Manual Therapy - Joint Manipulation; Pain management; Return to work related activity; Home exercise program; Safety education & training; Patient/Caregiver education & training; Equipment evaluation, education, & procurement; Modalities; Integrated dry needling; Therapeutic activities     Goals: (Goals have been discussed and agreed upon with patient.)  Short-Term Functional Goals: Time Frame: 1/12/2023 to 2/2/2023  Patient demonstrates independence with home exercise program without verbal cueing provided by therapist.  Pt will report worst pain 3/10 or less at rest in order to return to unrestricted prolonged sitting 30 mins or greater. Pt will report unrestricted sleeping through night 75% of week in order to progress towards sleeping habits consistent with prior level of function. Discharge Goals: Time Frame: 1/12/2023 to 2/23/2023  Pt will report worst pain 3/10 or less with prolonged standing/walking 15 mins or greater in order to return to unrestricted community distance ambulation. Pt will demonstrate gross RLE strength 4/5 or greater all planes in order to improve independence and safety with functional transfers and stair navigation. LEFS score of 30/80 or less/greater in order to demonstrate overall functional improvement. Outcome Measure: Tool Used: Lower Extremity Functional Scale (LEFS)  Score:  Initial: 19/80 Most Recent: X/80 (Date: -- )   Interpretation of Score: 20 questions each scored on a 5 point scale with 0 representing \"extreme difficulty or unable to perform\" and 4 representing \"no difficulty\".   The lower the score, the greater the functional disability. 80/80 represents no disability. Minimal detectable change is 9 points. Medical Necessity:   > Patient is expected to demonstrate progress in strength, range of motion, balance, coordination, and functional technique to increase independence with community distance ambulation, functional transfers, stair navigation and light house work activities. > Skilled intervention continues to be required due to decreased AROM, decreased strength, decreased balance, decreased gait, decreased functional mobility. Reason For Services/Other Comments:  > Patient continues to require skilled intervention due to increasing complexity of exercise. Total Duration:  Time In: 1232  Time Out: 5    Regarding Marshall Hart's therapy, I certify that the treatment plan above will be carried out by a therapist or under their direction.   Thank you for this referral,  Blossom Torres, PT     Referring Physician Signature: SASHA Freitas _______________________________ Date _____________        Post Session Pain  Charge Capture  PT Visit Info MD Guidelines  Ave

## 2023-01-18 ENCOUNTER — HOSPITAL ENCOUNTER (OUTPATIENT)
Dept: PHYSICAL THERAPY | Age: 63
Setting detail: RECURRING SERIES
Discharge: HOME | End: 2023-01-21
Payer: MEDICARE

## 2023-01-18 PROCEDURE — 97110 THERAPEUTIC EXERCISES: CPT

## 2023-01-18 PROCEDURE — 97140 MANUAL THERAPY 1/> REGIONS: CPT

## 2023-01-18 ASSESSMENT — PAIN SCALES - GENERAL: PAINLEVEL_OUTOF10: 5

## 2023-01-18 NOTE — PROGRESS NOTES
Isabel Blanton  : 1960  Primary: Medicare Part A And B (Medicare)  Secondary: [place] @ [address] [facility] [address]  Phone: [phone]  Fax: [phone] Plan Frequency: 1-2 visits per week for 6 weeks    Plan of Care/Certification Expiration Date: 23 (Start of [address] 2023)      PT Visit Info:  Plan Frequency: 1-2 visits per week for 6 weeks  Plan of Care/Certification Expiration Date: 23 (Start of [address] 2023)      Visit Count:  2    OUTPATIENT PHYSICAL THERAPY:OP NOTE TYPE: Treatment Note 2023       Episode  }Appt Desk             Treatment Diagnosis:  Right knee pain (M25.561)  Right knee stiffness (M25.661)  Muscle weakness, generalized (M62.81)  Difficulty walking, not elsewhere classified (R26.2)  Medical/Referring Diagnosis:  Unilateral primary osteoarthritis, right knee [M17.11]  Referring Physician:  SASHA Sales MD Orders:  PT Eval and Treat   Date of Onset:  Onset Date: 22 (Chronic right knee pain with exacerbation approximately 6 months ago.)     Allergies:   Crab extract allergy skin test, Cephalexin, Morphine, Morphine, Oxycodone-acetaminophen, Zolpidem, Adhesive tape, and Cephalexin  Restrictions/Precautions:  Restrictions/Precautions: Fall Risk  No data recorded     Interventions Planned (Treatment may consist of any combination of the following):    Current Treatment Recommendations: Strengthening; ROM; Balance training; Functional mobility training; Transfer training; ADL/Self-care training; Endurance training; Gait training; Stair training; Neuromuscular re-education; Manual Therapy - Soft Tissue Mobilization; Manual Therapy - Joint Manipulation; Pain management; Return to work related activity; Home exercise program; Safety education & training; Patient/Caregiver education & training; Equipment evaluation, education, & procurement; Modalities;  Integrated dry needling; Therapeutic activities     Subjective Comments:  Pt reports mild increased soreness with SLR exercise at home however per pt HEP compliance excellent. Initial:}    5/10Post Session:       6/10  Medications Last Reviewed:  1/18/2023  Updated Objective Findings:  None Today  Treatment   THERAPEUTIC EXERCISE: (45 minutes):    Exercises per grid below to improve mobility, strength, balance, and coordination. Required moderate visual, verbal, manual, and tactile cues to promote proper body alignment, promote proper body poster and proper body mechanics. Progressed resistance, range, repetitions and complexity of movement as indicated. Date:  1/12/2023 Date:  1/18/2023 Date:     Activity/Exercise Parameters Parameters Parameters   Quad set [id number] 10x5 sec hold    Heel slide Supine with strap, x20 Supine with strap x20    SLR 5x5sec 5x5sec    Hamstring stretch [id number] [id number]    IT band stretch [id number] 3u56cpv    Clamshell Left sidelying 2x10 Left sidelying, 2x10    Bridge 57m8nfm hold 10x5 sec hold    Sit to stand  x5    Standing hip abduction  X10 each    Standing hip extension  X10 each    Gastroc stretch  Towel stretch in long sitting, [id number]    PROM Manual right knee flexion/extension 4 mins Manual right knee flexion/extension 4 mins      Time spent with patient reviewing proper muscle recruitment and technique with exercises. MANUAL THERAPY: (10 minutes):   Joint mobilization and Soft tissue mobilization was utilized and necessary because of the patient's restricted joint motion, painful spasm, loss of articular motion, and restricted motion of soft tissue. Soft tissue mobilization: hamstring, distal quad, ITB. MODALITIES: (0 minutes):        None today. HEP: As above; handouts given to patient for all exercises.       Treatment/Session Summary:    Treatment Assessment:  Fair tolerance to exercise progression, verbal cues to ensure correct exercise technique and instruction on normal muscle soreness post exercise vs. knee joint symptom exacerbation. Communication/Consultation:   HEP update provided. Equipment provided today:  None  Recommendations/Intent for next treatment session: Next visit will focus on pain/swelling control, improve right knee ROM, improve RLE strength, progress balance/gait/functional mobility; focus on HEP progression and instruction. Total Treatment Billable Duration:  55 minute.   Time In: 1004  Time Out: [address] PT       Charge Capture  }Post Session Pain  PT Visit [address] Portal  MD Guidelines  Scanned Media  Benefits  MyChart    Future Appointments   Date Time Provider Rosaura Montelongo   1/24/2023  9:00 AM Dat Garg, PT [facility]   1/31/2023 10:00 AM Dat Garg PT [facility]   2/1/2023 11:45 AM Deena Bender MD [facility]

## 2023-01-24 ENCOUNTER — TELEPHONE (OUTPATIENT)
Dept: ORTHOPEDIC SURGERY | Age: 63
End: 2023-01-24

## 2023-01-24 NOTE — TELEPHONE ENCOUNTER
Pt called and wants to see [facility] for her right knee to discuss replacement sx. She doesn't want to wait until march.

## 2023-01-25 ENCOUNTER — TRANSCRIBE ORDERS (OUTPATIENT)
Dept: SCHEDULING | Age: 63
End: 2023-01-25

## 2023-01-25 DIAGNOSIS — Z12.31 VISIT FOR SCREENING MAMMOGRAM: Primary | ICD-10-CM

## 2023-01-27 ENCOUNTER — HOSPITAL ENCOUNTER (OUTPATIENT)
Dept: MAMMOGRAPHY | Age: 63
End: 2023-01-27
Payer: MEDICARE

## 2023-01-27 ENCOUNTER — HOSPITAL ENCOUNTER (OUTPATIENT)
Dept: PHYSICAL THERAPY | Age: 63
Setting detail: RECURRING SERIES
Discharge: HOME | End: 2023-01-30
Payer: MEDICARE

## 2023-01-27 DIAGNOSIS — Z12.31 VISIT FOR SCREENING MAMMOGRAM: ICD-10-CM

## 2023-01-27 PROCEDURE — 97140 MANUAL THERAPY 1/> REGIONS: CPT

## 2023-01-27 PROCEDURE — 97110 THERAPEUTIC EXERCISES: CPT

## 2023-01-27 PROCEDURE — 77067 SCR MAMMO BI INCL CAD: CPT

## 2023-01-27 ASSESSMENT — PAIN SCALES - GENERAL
PAINLEVEL_OUTOF10: 5
PAINLEVEL_OUTOF10: 6

## 2023-01-27 NOTE — PROGRESS NOTES
Jonathan Stewartmartha  : 1960  Primary: Medicare Part A And B (Medicare)  Secondary: [place] @ [address] [facility] [address]  Phone: [phone]  Fax: [phone] Plan Frequency: 1-2 visits per week for 6 weeks    Plan of Care/Certification Expiration Date: 23 (Start of [address] 2023)      PT Visit Info:  Plan Frequency: 1-2 visits per week for 6 weeks  Plan of Care/Certification Expiration Date: 23 (Start of [address] 2023)      Visit Count:  3    OUTPATIENT PHYSICAL THERAPY:OP NOTE TYPE: Treatment Note 2023       Episode  }Appt Desk             Treatment Diagnosis:  Right knee pain (M25.561)  Right knee stiffness (M25.661)  Muscle weakness, generalized (M62.81)  Difficulty walking, not elsewhere classified (R26.2)  Medical/Referring Diagnosis:  Unilateral primary osteoarthritis, right knee [M17.11]  Referring Physician:  SASHA Plaza MD Orders:  PT Eval and Treat   Date of Onset:  Onset Date: 22 (Chronic right knee pain with exacerbation approximately 6 months ago.)     Allergies:   Crab extract allergy skin test, Cephalexin, Morphine, Morphine, Oxycodone-acetaminophen, Zolpidem, Adhesive tape, and Cephalexin  Restrictions/Precautions:  Restrictions/Precautions: Fall Risk  No data recorded     Interventions Planned (Treatment may consist of any combination of the following):    Current Treatment Recommendations: Strengthening; ROM; Balance training; Functional mobility training; Transfer training; ADL/Self-care training; Endurance training; Gait training; Stair training; Neuromuscular re-education; Manual Therapy - Soft Tissue Mobilization; Manual Therapy - Joint Manipulation; Pain management; Return to work related activity; Home exercise program; Safety education & training; Patient/Caregiver education & training; Equipment evaluation, education, & procurement; Modalities;  Integrated dry needling; Therapeutic activities     Subjective Comments:  Pt reports some difficulty with SLR exercise at home however no excessive soreness reported with HEP performance; HEP compliance good. Initial:}    5/10Post Session:       5/10  Medications Last Reviewed:  1/27/2023  Updated Objective Findings:  None Today  Treatment   THERAPEUTIC EXERCISE: (48 minutes):    Exercises per grid below to improve mobility, strength, balance, and coordination. Required moderate visual, verbal, manual, and tactile cues to promote proper body alignment, promote proper body poster and proper body mechanics. Progressed resistance, range, repetitions and complexity of movement as indicated. Date:  1/12/2023 Date:  1/18/2023 Date:  1/27/23   Activity/Exercise Parameters Parameters Parameters   Quad set 80o6mkm 10x5 sec hold 67o1ktd   Heel slide Supine with strap, x20 Supine with strap x20 Supine AROM, x30   SLR 5x5sec 5x5sec 2x10   Hamstring stretch 9e86fin 0d14aso 8y59lmg   IT band stretch 0j70sct 9o14ieb 2j19tri   Clamshell Left sidelying 2x10 Left sidelying, 2x10 Left sidelying, 2x10   Bridge 12u2uvv hold 10x5 sec hold Verbal review   Sit to stand  x5 x5   Standing hip abduction  X10 each x15   Standing hip extension  X10 each x15   Gastroc stretch  Towel stretch in long sitting, [id number] Verbal review   Standing hamstring curl   x15   Step ups   4 in fwd, 2x10   PROM Manual right knee flexion/extension 4 mins Manual right knee flexion/extension 4 mins Manual right knee flexion/extension 4 mins     Time spent with patient reviewing proper muscle recruitment and technique with exercises. MANUAL THERAPY: (8 minutes):   Joint mobilization and Soft tissue mobilization was utilized and necessary because of the patient's restricted joint motion, painful spasm, loss of articular motion, and restricted motion of soft tissue. Soft tissue mobilization: hamstring, distal quad, ITB. MODALITIES: (0 minutes):        None today. HEP: As above; handouts given to patient for all exercises. Treatment/Session Summary:    Treatment Assessment:  Good tolerance and understanding of exercise program observed today. Communication/Consultation:   HEP update provided. Equipment provided today:  None  Recommendations/Intent for next treatment session: Next visit will focus on pain/swelling control, improve right knee ROM, improve RLE strength, progress balance/gait/functional mobility; focus on HEP progression and instruction. Total Treatment Billable Duration:  56 minute.   Time In: 0906  Time Out: 1003    Santa Andersen, PT       Charge Capture  }Post Session Pain  PT Visit [facility] Portal  MD Guidelines  Scanned Media  Benefits  MyChart    Future Appointments   Date Time Provider [place]   1/27/2023  1:45 PM [facility]   1/31/2023 10:00 AM Santa Andersen PT [facility]   2/1/2023 11:45 AM Josefina Young MD [facility]   2/13/2023  3:15 PM Rossana Hoover  [facility]   3/1/2023  9:30 AM Tano Weinstein MD [facility]

## 2023-01-31 ENCOUNTER — HOSPITAL ENCOUNTER (OUTPATIENT)
Dept: PHYSICAL THERAPY | Age: 63
Setting detail: RECURRING SERIES
Discharge: HOME | End: 2023-02-03
Payer: MEDICARE

## 2023-01-31 PROCEDURE — 97110 THERAPEUTIC EXERCISES: CPT

## 2023-01-31 ASSESSMENT — PAIN SCALES - GENERAL: PAINLEVEL_OUTOF10: 5

## 2023-01-31 NOTE — THERAPY DISCHARGE
Clair Duckworth  : 1960  Primary: Medicare Part A And B (Medicare)  Secondary: [place] @ [address] [facility] [place] [address]  Phone: [phone]  Fax: [phone] Plan Frequency: 1-2 visits per week for 6 weeks    Plan of Care/Certification Expiration Date: 23 (Start of [address] 2023)      PT Visit Info:    Plan Frequency: 1-2 visits per week for 6 weeks  Plan of Care/Certification Expiration Date: 23 (Start of [address] 2023)      Visit Count:  4                OUTPATIENT PHYSICAL THERAPY:             OP NOTE TYPE: Discharge Summary 2023               Episode  Appt Desk         Treatment Diagnosis:  Right knee pain (M25.561)  Right knee stiffness (M25.661)  Muscle weakness, generalized (M62.81)  Difficulty walking, not elsewhere classified (R26.2)  Medical/Referring Diagnosis:  Unilateral primary osteoarthritis, right knee [M17.11]  Referring Physician:  SASHA Schaffer MD Orders:  PT Eval and Treat  Return MD Appt:  TBD by patient  Date of Onset:  Onset Date: 22 (Chronic right knee pain with exacerbation approximately 6 months ago.)      Allergies:  Crab extract allergy skin test, Cephalexin, Morphine, Morphine, Oxycodone-acetaminophen, Zolpidem, Adhesive tape, and Cephalexin  Restrictions/Precautions:    Restrictions/Precautions: Fall Risk        Medications Last Reviewed:  2023      OBJECTIVE   Observation/Postural and Gait Assessment: Gait: Mild antalgic gait pattern RLE with use of single point cane; Posture: Knee valgus observed bilaterally in static stance. Palpation: Tenderness to lateral patellar retinaculum, IT band and lateral knee joint line. ROM:     AROM(PROM) Left Right   Knee flexion 115° 131°   Knee extension 0° 0°     Passive Accessory Mobility Testing: Patellar hypomobility primarily in superior direction.     Strength:       Manual Muscle Test (out of 5) Left Right   Knee extension 4+ 4+   Knee flexion 4 4   Hip abduction 4- 3+     Special Tests:  - Jhony's, + Pj, - ligamentous testing, - kary's    Neurological Screen: Unremarkable. Functional Mobility:  Sit to stand: Improve trunk with BLE coordination observed during sit to stand transition. Supine to sit: Independent with use of log roll technique, slow movement speed. ASSESSMENT   Initial Assessment:  Mrs. Annye Dance is a 64 y/o female with complaints of right knee pain and difficulty walking secondary to MD diagnosed primary osteoarthritis of right knee joint. Pt reports has discussed treatment options and likely patient will be undergoing total knee arthroplasty later year per MD recommendation. Pt states primary goal of PT treatment is for pre op strengthening and HEP instruction prior to undergoing surgical intervention. Pt referred by MD for PT eval and treat. Pt will benefit from skilled PT treatment to address deficits in strength, AROM, balance, gait and functional mobility in order to return to prior level of function and improve quality of life. Naa Guo NOTE 1/31/2023: Pt has completed 4 PT treatment sessions from 1/12/2023 to 1/31/2023 with focus on pre op HEP for total knee arthroplasty planned for later this year. Pt demonstrates good HEP compliance and excellent understanding of HEP exercises at this time. Pt verbalizes confidence she can continue independently with HEP at this time and agrees with plan to discharge from PT treatment. Problem List: (Impacting functional limitations): Body Structures, Functions, Activity Limitations Requiring Skilled Therapeutic Intervention: Decreased functional mobility ; Decreased ADL status; Decreased ROM; Decreased body mechanics; Decreased tolerance to work activity; Decreased strength; Decreased endurance; Decreased sensation; Decreased balance; Decreased coordination;  Increased pain; Decreased posture     Therapy Prognosis:   Therapy Prognosis: Good     Initial Assessment Complexity:   Decision Making: Low Complexity    PLAN   Effective Dates: 1/12/2023 TO Plan of Care/Certification Expiration Date: 02/23/23 (Start of POC 1/12/2023)     Frequency/Duration: Plan Frequency: 1-2 visits per week for 6 weeks     Interventions Planned (Treatment may consist of any combination of the following):    Current Treatment Recommendations: Strengthening; ROM; Balance training; Functional mobility training; Transfer training; ADL/Self-care training; Endurance training; Gait training; Stair training; Neuromuscular re-education; Manual Therapy - Soft Tissue Mobilization; Manual Therapy - Joint Manipulation; Pain management; Return to work related activity; Home exercise program; Safety education & training; Patient/Caregiver education & training; Equipment evaluation, education, & procurement; Modalities; Integrated dry needling; Therapeutic activities     Goals: (Goals have been discussed and agreed upon with patient.)  Short-Term Functional Goals: Time Frame: 1/12/2023 to 2/2/2023  Patient demonstrates independence with home exercise program without verbal cueing provided by therapist.- goal met  Pt will report worst pain 3/10 or less at rest in order to return to unrestricted prolonged sitting 30 mins or greater. - goal met  Pt will report unrestricted sleeping through night 75% of week in order to progress towards sleeping habits consistent with prior level of function. - partially met  Discharge Goals: Time Frame: 1/12/2023 to 2/23/2023  Pt will report worst pain 3/10 or less with prolonged standing/walking 15 mins or greater in order to return to unrestricted community distance ambulation. - partially met  Pt will demonstrate gross RLE strength 4/5 or greater all planes in order to improve independence and safety with functional transfers and stair navigation.  - partially met  LEFS score of 30/80 or less/greater in order to demonstrate overall functional improvement. - NT 1/31/2023         Outcome Measure: Tool Used: Lower Extremity Functional Scale (LEFS)  Score:  Initial: 19/80 Most Recent: NT/80 (Date: 1/31/2023)   Interpretation of Score: 20 questions each scored on a 5 point scale with 0 representing \"extreme difficulty or unable to perform\" and 4 representing \"no difficulty\". The lower the score, the greater the functional disability. 80/80 represents no disability. Minimal detectable change is 9 points. Medical Necessity:   > Patient is expected to demonstrate progress in strength, range of motion, balance, coordination, and functional technique to increase independence with community distance ambulation, functional transfers, stair navigation and light house work activities. > Skilled intervention continues to be required due to decreased AROM, decreased strength, decreased balance, decreased gait, decreased functional mobility. Reason For Services/Other Comments:  > Patient continues to require skilled intervention due to increasing complexity of exercise. Total Duration:  Time In: 1002  Time Out: 3253    Regarding Gunn Ley Hailey's therapy, I certify that the treatment plan above will be carried out by a therapist or under their direction. Thank you for this referral,  Jared Johnson, PT     Referring Physician Signature: SASHA Cox No Signature is Required for this note.         Post Session Pain  Charge Capture  PT Visit Info MD Guidelines  MyChart

## 2023-01-31 NOTE — PROGRESS NOTES
Liz Vanegas  : 1960  Primary: Medicare Part A And B (Medicare)  Secondary: [place] @ [address] [facility] [address]  Phone: [phone]  Fax: [phone] Plan Frequency: 1-2 visits per week for 6 weeks    Plan of Care/Certification Expiration Date: 23 (Start of [address] 2023)      PT Visit Info:  Plan Frequency: 1-2 visits per week for 6 weeks  Plan of Care/Certification Expiration Date: 23 (Start of [address] 2023)      Visit Count:  4    OUTPATIENT PHYSICAL THERAPY:OP NOTE TYPE: Treatment Note 2023       Episode  }Appt Desk             Treatment Diagnosis:  Right knee pain (M25.561)  Right knee stiffness (M25.661)  Muscle weakness, generalized (M62.81)  Difficulty walking, not elsewhere classified (R26.2)  Medical/Referring Diagnosis:  Unilateral primary osteoarthritis, right knee [M17.11]  Referring Physician:  SASHA Price MD Orders:  PT Eval and Treat   Date of Onset:  Onset Date: 22 (Chronic right knee pain with exacerbation approximately 6 months ago.)     Allergies:   Crab extract allergy skin test, Cephalexin, Morphine, Morphine, Oxycodone-acetaminophen, Zolpidem, Adhesive tape, and Cephalexin  Restrictions/Precautions:  Restrictions/Precautions: Fall Risk  No data recorded     Interventions Planned (Treatment may consist of any combination of the following):    Current Treatment Recommendations: Strengthening; ROM; Balance training; Functional mobility training; Transfer training; ADL/Self-care training; Endurance training; Gait training; Stair training; Neuromuscular re-education; Manual Therapy - Soft Tissue Mobilization; Manual Therapy - Joint Manipulation; Pain management; Return to work related activity; Home exercise program; Safety education & training; Patient/Caregiver education & training; Equipment evaluation, education, & procurement; Modalities;  Integrated dry needling; Therapeutic activities     Subjective Comments:  Pt reports feeling confident she can continue with HEP independently at this time. Initial:}    5/10Post Session:       4/10  Medications Last Reviewed:  1/31/2023  Updated Objective Findings:   See discharge note dated 1/31/2023. Treatment   THERAPEUTIC EXERCISE: (54 minutes):    Exercises per grid below to improve mobility, strength, balance, and coordination. Required moderate visual, verbal, manual, and tactile cues to promote proper body alignment, promote proper body poster and proper body mechanics. Progressed resistance, range, repetitions and complexity of movement as indicated. Date:  1/31/2023 Date:  1/18/2023 Date:  1/27/23   Activity/Exercise Parameters Parameters Parameters   Quad set [id number] 10x5 sec hold [id number]   Heel slide Supine with strap, x30 Supine with strap x20 Supine AROM, x30   SLR 2x10 5x5sec 2x10   Hamstring stretch [id number]   IT band stretch [id number]   Clamshell Left sidelying 2x10 Left sidelying, 2x10 Left sidelying, 2x10   Bridge Verbal review 10x5 sec hold Verbal review   Sit to stand x8 x5 x5   Standing hip abduction X15 each X10 each x15   Standing hip extension X15 each X10 each x15   Gastroc stretch Verbal review Towel stretch in long sitting, [id number] Verbal review   Standing hamstring curl X15 each  x15   Step ups 6 in x10 each  4 in fwd, 2x10   Lateral stepping Length of bar x4     NUSTEP 7 mins, level 1           PROM -- Manual right knee flexion/extension 4 mins Manual right knee flexion/extension 4 mins     Time spent with patient reviewing proper muscle recruitment and technique with exercises. MANUAL THERAPY: (0 minutes):   Joint mobilization and Soft tissue mobilization was utilized and necessary because of the patient's restricted joint motion, painful spasm, loss of articular motion, and restricted motion of soft tissue.   Soft tissue mobilization: hamstring, distal quad, ITB.  - none today    MODALITIES: (0 minutes):        None today. HEP: As above; handouts given to patient for all exercises. Treatment/Session Summary:    Treatment Assessment:  Pt demonstrates indepencen with pre op total knee HEP during today's visit. Communication/Consultation:   Final HEP handout provided. Equipment provided today:  None  Recommendations/Intent for next treatment session: Discharge to HEP at this time, see discharge note dated 1/31/2023    Total Treatment Billable Duration:  54 minute.   Time In: 1002  Time Out: [facility], PT       Charge Capture  }Post Session Pain  PT Visit [address] [facility] Portal  MD Guidelines  Scanned Media  Benefits  MyChart    Future Appointments   Date Time Provider [place]   2/1/2023 11:45 AM Antionette Ocasio MD [facility]   2/13/2023  3:15 PM Brittany Norman  [facility]. Fr   3/1/2023  9:30 AM Jorge Rodriguez MD [facility]

## 2023-02-01 ENCOUNTER — HOSPITAL ENCOUNTER (OUTPATIENT)
Dept: GENERAL RADIOLOGY | Age: 63
Discharge: HOME | End: 2023-02-04
Payer: MEDICARE

## 2023-02-01 ENCOUNTER — OFFICE VISIT (OUTPATIENT)
Dept: NEUROSURGERY | Age: 63
End: 2023-02-01
Payer: MEDICARE

## 2023-02-01 VITALS
BODY MASS INDEX: 31.37 KG/M2 | TEMPERATURE: 97.4 F | HEART RATE: 79 BPM | WEIGHT: 207 LBS | DIASTOLIC BLOOD PRESSURE: 42 MMHG | HEIGHT: 68 IN | SYSTOLIC BLOOD PRESSURE: 103 MMHG | OXYGEN SATURATION: 94 %

## 2023-02-01 DIAGNOSIS — Z98.890 S/P CERVICAL DISCECTOMY: Primary | ICD-10-CM

## 2023-02-01 DIAGNOSIS — Z98.890 S/P CERVICAL DISCECTOMY: ICD-10-CM

## 2023-02-01 DIAGNOSIS — Z09 SURGICAL FOLLOW-UP CARE: ICD-10-CM

## 2023-02-01 DIAGNOSIS — Z98.1 S/P CERVICAL SPINAL FUSION: ICD-10-CM

## 2023-02-01 PROCEDURE — 3074F SYST BP LT 130 MM HG: CPT | Performed by: NEUROLOGICAL SURGERY

## 2023-02-01 PROCEDURE — G8484 FLU IMMUNIZE NO ADMIN: HCPCS | Performed by: NEUROLOGICAL SURGERY

## 2023-02-01 PROCEDURE — 72040 X-RAY EXAM NECK SPINE 2-3 VW: CPT

## 2023-02-01 PROCEDURE — 3017F COLORECTAL CA SCREEN DOC REV: CPT | Performed by: NEUROLOGICAL SURGERY

## 2023-02-01 PROCEDURE — 99213 OFFICE O/P EST LOW 20 MIN: CPT | Performed by: NEUROLOGICAL SURGERY

## 2023-02-01 PROCEDURE — 1036F TOBACCO NON-USER: CPT | Performed by: NEUROLOGICAL SURGERY

## 2023-02-01 PROCEDURE — G8427 DOCREV CUR MEDS BY ELIG CLIN: HCPCS | Performed by: NEUROLOGICAL SURGERY

## 2023-02-01 PROCEDURE — G8417 CALC BMI ABV UP PARAM F/U: HCPCS | Performed by: NEUROLOGICAL SURGERY

## 2023-02-01 PROCEDURE — 3078F DIAST BP <80 MM HG: CPT | Performed by: NEUROLOGICAL SURGERY

## 2023-02-01 NOTE — PROGRESS NOTES
[facility] NOTE:   History of Present Illness:    CC: Postoperative follow-up    Keith Bill is a 63 y.o. female who presents today for postoperative follow-up visit as the patient underwent a C5-C6 anterior cervical discectomy and fusion for treatment of C6 cervical radiculopathy and release of cervical spinal stenosis or cord compression performed on 4/12/2022. The patient is doing well in the postoperative setting. She has repeat AP and lateral x-ray imaging of the cervical spine that demonstrates no significant interval change in comparison to previous x-ray imaging. This consistent with a stable fusion construct. The patient did notes that she is having significant right knee pain which is bone-on-bone and is planning to undergo a right knee replacement surgery in the coming months to be performed by Dr. Claudia Schilling. She does ambulate with assistance of a cane because of this reason.    Past Medical History:   Diagnosis Date    Anemia     has been anemic in past - <11; does not take meds or infusions     Arrhythmia     [facility] 2018 - aspirin and metoprolol     Arthritis     osteo     Asthma     under control with meds and inhalers and nebs no SOB - mainly exercise induced and allergy induced but uses daily inhaler     Chronic pain     from neck and shoulder     COVID     hx of covid - 9/2021 in [facility] - was not hospitalized at that time     Depression     trazodone     Diabetes ([facility] [address]     oral meds; A1C 1/28/2022 = 6.2; avg fasting blood sugar=    GERD (gastroesophageal reflux disease)     omeprazole taken as needed     Headache     migraines takes maxalt as needed     Hypercholesterolemia     crestor at night     Hypertension     under control with meds     Lymphadenopathy     Bilat [id number]    Nausea & vomiting     one time after surgery at [facility] - was in great deal of pain - after bladder stimulator placement     Neurogenic bladder     Psychotic disorder ([facility] [address]     bipolar; meds taken under control     PUD (peptic ulcer disease)     hiatal hernia; takes omeprazole as needed OTC     Sleep apnea     wears cpap nightly       Past Surgical History:   Procedure Laterality Date    CERVICAL FUSION  04/12/2022    ACDF C5-6 DR. Haines    COLONOSCOPY      CYST REMOVAL      under left arm - no lymph nodes removed     GYN      SHAYLEE    HEENT      tonsils parotid    ORTHOPEDIC SURGERY Bilateral     bilat [facility]     ROTATOR CUFF REPAIR Left     RCR     SHOULDER ARTHROSCOPY Right     shoulder scope to clean up the joint    UPPER GASTROINTESTINAL ENDOSCOPY      UROLOGICAL SURGERY      bladder stimulator-[place] x5     Allergies   Allergen Reactions    Crab Extract Allergy Skin Test Diarrhea    Cephalexin      Other reaction(s): itching    Morphine Other (See Comments)     Hallucinations, itching    Morphine      Other reaction(s): Unknown    Oxycodone-Acetaminophen      Other reaction(s): Hitch    Zolpidem Other (See Comments)     Short term memory loss    Adhesive Tape Rash     Event:      Cephalexin Rash     itching      Family History   Problem Relation Age of Onset    Breast Cancer Neg Hx     Parkinson's Disease Father     Heart Disease Father     Diabetes Father     Hypertension Father       Social History     Socioeconomic History    Marital status:      Spouse name: Not on file    Number of children: Not on file    Years of education: Not on file    Highest education level: Not on file   Occupational History    Not on file   Tobacco Use    Smoking status: Former    Smokeless tobacco: Never    Tobacco comments:     Quit smoking: college days only smoked 9 months   Substance and Sexual Activity    Alcohol use:  Yes     Alcohol/week: 1.0 standard drink    Drug use: Never    Sexual activity: Not on file   Other Topics Concern    Not on file   Social History Narrative    Not on file     Social Determinants of Health     Financial Resource Strain: Not on file   Food Insecurity: Not on file   Transportation Needs: Not on file   Physical Activity: Not on file   Stress: Not on file   Social Connections: Not on file   Intimate Partner Violence: Not on file   Housing Stability: Not on file     Current Outpatient Medications   Medication Sig Dispense Refill    benzonatate (TESSALON) 100 MG capsule       ondansetron (ZOFRAN) 4 MG tablet 1-2 tabs, Oral, q6hr, PRN, 10 tabs, 0, 0, 04/15/21 15:34:00 EDT, Route to Pharmacy Electronically, [id number], 1-2 tabs Oral q6hr, x7 days, PRN:as needed for nausea/vomiting, as needed for nausea/vomiting, 172.72, cm, 03/12/. ..      losartan (COZAAR) 100 MG tablet Take 100 mg by mouth daily      amoxicillin-clavulanate (AUGMENTIN) 875-125 MG per tablet TAKE 1 TABLET BY MOUTH TWICE DAILY FOR 10 DAYS      nitrofurantoin, macrocrystal-monohydrate, (MACROBID) 100 MG capsule       estradiol (ESTRACE) 0.1 MG/GM vaginal cream       lidocaine (XYLOCAINE) 2 % jelly       hydrocortisone (ANUSOL-HC) 25 MG suppository insert 1 suppository by rectal route  every day for 7 days then as needed      diclofenac sodium (VOLTAREN) 1 % GEL       pregabalin (LYRICA) 75 MG capsule Take 1 capsule twice a day for nerve pain      cyclobenzaprine (FLEXERIL) 10 MG tablet Take 10 mg by mouth 3 times daily as needed      omeprazole (PRILOSEC) 20 MG delayed release capsule       JANUMET -1000 MG TB24       traMADol HCl 100 MG TABS Take 100 mg by mouth 2 times daily      pregabalin (LYRICA) 75 MG capsule Take 75 mg by mouth 2 times daily.       traZODone (DESYREL) 100 MG tablet 1-2 tablet at bedtime Orally at bedtime      chlorthalidone (HYGROTON) 25 MG tablet 1 tablet in the morning with food Orally Once a day for 30 day(s)      desvenlafaxine succinate (PRISTIQ) 50 MG TB24 extended release tablet 2 tablet Orally Once a day      fluticasone-salmeterol (ADVAIR HFA) 45-21 MCG/ACT inhaler 2 puffs Inhalation Twice a day      lisinopril (PRINIVIL;ZESTRIL) 10 MG tablet 1 tablet Orally Once a day for 30 day(s)      rosuvastatin (CRESTOR) 40 MG tablet 1 tablet Orally Once a day for 30 day(s)      tiZANidine (ZANAFLEX) 4 MG tablet 1 tablet as needed Orally Three times a day      albuterol (PROVENTIL) (2.5 MG/3ML) 0.083% nebulizer solution Albuterol sulfate 2.5 mg/3 mL (0.083 %) solution for nebulization USE 1 VIAL VIA NEBULIZER TWICE DAILY IF NEEDED FOR ASTHMA EXACERBATION      albuterol sulfate  (90 Base) MCG/ACT inhaler Inhale 2 puffs into the lungs every 4 hours as needed      aspirin 81 MG EC tablet Take 81 mg by mouth      Cetirizine HCl (ZYRTEC ALLERGY) 10 MG CAPS Take by mouth daily      chlorthalidone (HYGROTON) 25 MG tablet Take 25 mg by mouth daily      cyanocobalamin 1000 MCG/ML injection Inject 1,000 mcg into the skin      desvenlafaxine succinate (PRISTIQ) 100 MG TB24 extended release tablet Take by mouth daily      docusate (COLACE, DULCOLAX) 100 MG CAPS Take 100 mg by mouth 3 times daily as needed      ergocalciferol (ERGOCALCIFEROL) 1.25 MG (56572 UT) capsule Take 50,000 Units by mouth      fluticasone-salmeterol (ADVAIR) 250-50 MCG/DOSE AEPB Advair Diskus 250 mcg-50 mcg/dose powder for inhalation Inhale 2 puffs twice a day by inhalation route for 90 days.       lidocaine (LIDODERM) 5 % Lidoderm 5 % topical patch APPLY 1 PATCH BY TOPICAL ROUTE ONCE DAILY (MAY WEAR UP TO 12HOURS.)      lisinopril (PRINIVIL;ZESTRIL) 10 MG tablet Take 10 mg by mouth daily      metoprolol succinate (TOPROL XL) 100 MG extended release tablet Take by mouth daily      omeprazole (PRILOSEC OTC) 20 MG tablet Take 20 mg by mouth daily      polyethylene glycol (GLYCOLAX) 17 GM/SCOOP powder Take by mouth as needed      potassium chloride (MICRO-K) 10 MEQ extended release capsule Take 10 mEq by mouth daily      rizatriptan (MAXALT-MLT) 5 MG disintegrating tablet Take 5 mg by mouth      rosuvastatin (CRESTOR) 20 MG tablet Take 20 mg by mouth      sitaGLIPtan-metFORMIN (JANUMET)  MG per tablet Take 1 tablet by mouth daily (with breakfast)      tiZANidine (ZANAFLEX) 4 MG tablet Take 4 mg by mouth 3 times daily as needed      traZODone (DESYREL) 100 MG tablet Take 100 mg by mouth      triamcinolone (NASACORT) 55 MCG/ACT nasal inhaler 2 sprays by Nasal route as needed       No current facility-administered medications for this visit.      Patient Active Problem List   Diagnosis    Cervical spinal stenosis    DDD (degenerative disc disease), cervical    Cervical radiculopathy    Neuroforaminal stenosis of cervical spine    Allergic rhinitis    Atrial fibrillation (HCC)    Bipolar I disorder, single manic episode (HCC)    Cervicalgia    Chronic low back pain    Closed fracture of left distal radius    Constipation    COVID-19 virus infection    Decreased libido    Disorder of shoulder    Dysfunction of left rotator cuff    Shoulder pain, left    Asthma    Exercise-induced asthma    Obstructive sleep apnea syndrome    Fibrocystic breast disease    Full incontinence of feces    Hypercholesterolemia    Hyperlipidemia    Hypertension    Impingement syndrome of right shoulder    Injury of shoulder and upper arm    Neurogenic bladder    Nonintractable migraine    Osteoarthrosis    Pain in joint    Patellofemoral pain syndrome of left knee    Adhesive capsulitis of right shoulder    Primary osteoarthritis of left shoulder    Primary osteoarthritis of right shoulder    Secondary adhesive capsulitis of left shoulder    Tachycardia    Type 2 diabetes mellitus (HCC)    Wears glasses    Pes anserinus bursitis of both knees    Arthritis of both knees    Incontinence of feces    Urinary tract infection    Urge incontinence of urine    Incomplete emptying of bladder    Hiatal hernia    Bladder spasm        Review of Systems    Physical Exam:  BP (!) 103/42   Pulse 79   Temp 97.4 °F (36.3 °C) (Temporal)   Ht 5' 8\" (1.727 m)   Wt 207 lb (93.9 kg)   SpO2 94%   BMI 31.47 kg/m²   Pain Score:   0 - No pain  Head normocephalic and atraumatic  Mood and affect appropriate  General: No acute distress  CV: Regular rate  Resp: No increased work of breathing  Skin: warm and dry  Awake, alert, and oriented   Speech Fluent  Eyes open spontaneously   Face symmetric and tongue midline on protrusion  Sternocleidomastoid and trapezius strength 5/5  No mid-line cervical, thoracic, or lumbar tenderness to palpation   Patient with strength exam as follows:   Upper Extremities: Right Left      Deltoid  5 5    Biceps  5 5    Triceps 5 5      5 5     Lower Extremities:      Hip Flex 5 5    Quads  5 5    Hamstrings 5 5    Dorsiflex 5 5    Plantarflex 5 5    EHL  5 5  Sensation intact to light touch and pin-prick   DTR 2+ well-healed right anterior transverse incision  No clonus or babinski present   No Veliz's sign present bilaterally   Gait: Ambulates with an antalgic gait with the assistance of a cane favoring her right leg    Assessment & Plan:  Jonathan Garcias is a 63 y.o. female who presents today for postoperative follow-up visit as the patient underwent a C5-C6 anterior cervical discectomy and fusion for treatment of C6 cervical radiculopathy and release of cervical spinal stenosis or cord compression performed on 4/12/2022. I have independently reviewed and interpreted the patient's AP and lateral x-ray imaging of the cervical spine that demonstrates no significant interval change in comparison to previous x-ray imaging. This consistent with a stable fusion construct. At this time no further scheduled neurosurgical follow-up is necessary. The patient will call back to schedule a follow-up after she has undergone replacement of her right knee and would like to discuss a work-up and evaluation of her low back pain at that time. We will see her back on an as-needed basis. ICD-10-CM    1. S/P cervical discectomy  Z98.890       2. S/P cervical spinal fusion  Z98.1           Neil Munoz, [facility]  [address] [facility]     Notes are transcribed with [address] [place] [address], a medical voice recording dictation service, and may contain minor errors.

## 2023-02-09 PROBLEM — N39.0 URINARY TRACT INFECTION: Status: RESOLVED | Noted: 2022-10-30 | Resolved: 2023-02-09

## 2023-02-14 ENCOUNTER — APPOINTMENT (OUTPATIENT)
Dept: CT IMAGING | Age: 63
End: 2023-02-14
Payer: MEDICARE

## 2023-02-14 ENCOUNTER — HOSPITAL ENCOUNTER (EMERGENCY)
Age: 63
Discharge: HOME | End: 2023-02-14
Attending: EMERGENCY MEDICINE
Payer: MEDICARE

## 2023-02-14 VITALS
WEIGHT: 206 LBS | OXYGEN SATURATION: 97 % | DIASTOLIC BLOOD PRESSURE: 78 MMHG | BODY MASS INDEX: 31.22 KG/M2 | TEMPERATURE: 97.7 F | HEIGHT: 68 IN | HEART RATE: 92 BPM | SYSTOLIC BLOOD PRESSURE: 124 MMHG | RESPIRATION RATE: 20 BRPM

## 2023-02-14 DIAGNOSIS — R10.9 LEFT FLANK PAIN: Primary | ICD-10-CM

## 2023-02-14 DIAGNOSIS — N30.00 ACUTE CYSTITIS WITHOUT HEMATURIA: ICD-10-CM

## 2023-02-14 LAB
ALBUMIN SERPL-MCNC: 4.3 G/DL (ref 3.2–4.6)
ALBUMIN/GLOB SERPL: 1.6 (ref 0.4–1.6)
ALP SERPL-CCNC: 104 U/L (ref 45–117)
ALT SERPL-CCNC: 13 U/L (ref 13–61)
ANION GAP SERPL CALC-SCNC: 10 MMOL/L (ref 2–11)
APPEARANCE UR: CLEAR
AST SERPL-CCNC: 15 U/L (ref 15–37)
BACTERIA URNS QL MICRO: NORMAL /HPF
BASOPHILS # BLD: 0 K/UL (ref 0–0.2)
BASOPHILS NFR BLD: 0 % (ref 0–2)
BILIRUB SERPL-MCNC: 0.2 MG/DL (ref 0.2–1.1)
BILIRUB UR QL: NEGATIVE
BUN SERPL-MCNC: 17 MG/DL (ref 7–18)
CALCIUM SERPL-MCNC: 9.3 MG/DL (ref 8.3–10.4)
CASTS URNS QL MICRO: NORMAL /LPF
CHLORIDE SERPL-SCNC: 103 MMOL/L (ref 98–107)
CO2 SERPL-SCNC: 28 MMOL/L (ref 21–32)
COLOR UR: YELLOW
CREAT SERPL-MCNC: 0.7 MG/DL (ref 0.6–1)
CRYSTALS URNS QL MICRO: 0 /LPF
DIFFERENTIAL METHOD BLD: ABNORMAL
EOSINOPHIL # BLD: 0.1 K/UL (ref 0–0.8)
EOSINOPHIL NFR BLD: 3 % (ref 0.5–7.8)
EPI CELLS #/AREA URNS HPF: NORMAL /HPF
ERYTHROCYTE [DISTWIDTH] IN BLOOD BY AUTOMATED COUNT: 13.6 % (ref 11.9–14.6)
GLOBULIN SER CALC-MCNC: 2.7 G/DL (ref 2.8–4.5)
GLUCOSE SERPL-MCNC: 98 MG/DL (ref 65–100)
GLUCOSE UR STRIP.AUTO-MCNC: NEGATIVE MG/DL
HCT VFR BLD AUTO: 35.9 % (ref 35.8–46.3)
HGB BLD-MCNC: 11.7 G/DL (ref 11.7–15.4)
HGB UR QL STRIP: NEGATIVE
IMM GRANULOCYTES # BLD AUTO: 0 K/UL (ref 0–0.5)
IMM GRANULOCYTES NFR BLD AUTO: 0 % (ref 0–5)
KETONES UR QL STRIP.AUTO: NEGATIVE MG/DL
LEUKOCYTE ESTERASE UR QL STRIP.AUTO: ABNORMAL
LIPASE SERPL-CCNC: 29 U/L (ref 13–60)
LYMPHOCYTES # BLD: 1.4 K/UL (ref 0.5–4.6)
LYMPHOCYTES NFR BLD: 29 % (ref 13–44)
MCH RBC QN AUTO: 27.5 PG (ref 26.1–32.9)
MCHC RBC AUTO-ENTMCNC: 32.6 G/DL (ref 31.4–35)
MCV RBC AUTO: 84.5 FL (ref 82–102)
MONOCYTES # BLD: 0.5 K/UL (ref 0.1–1.3)
MONOCYTES NFR BLD: 9 % (ref 4–12)
MUCOUS THREADS URNS QL MICRO: 0 /LPF
NEUTS SEG # BLD: 2.9 K/UL (ref 1.7–8.2)
NEUTS SEG NFR BLD: 59 % (ref 43–78)
NITRITE UR QL STRIP.AUTO: NEGATIVE
NRBC # BLD: 0 K/UL (ref 0–0.2)
OTHER OBSERVATIONS: NORMAL
PH UR STRIP: 5.5 (ref 5–9)
PLATELET # BLD AUTO: 235 K/UL (ref 150–450)
PMV BLD AUTO: 9.3 FL (ref 9.4–12.3)
POTASSIUM SERPL-SCNC: 3.7 MMOL/L (ref 3.5–5.1)
PROT SERPL-MCNC: 7 G/DL (ref 6.4–8.2)
PROT UR STRIP-MCNC: 30 MG/DL
RBC # BLD AUTO: 4.25 M/UL (ref 4.05–5.2)
RBC #/AREA URNS HPF: NORMAL /HPF
SODIUM SERPL-SCNC: 141 MMOL/L (ref 133–143)
SP GR UR REFRACTOMETRY: >=1.03 (ref 1–1.02)
UROBILINOGEN UR QL STRIP.AUTO: 0.2 EU/DL (ref 0.2–1)
WBC # BLD AUTO: 4.8 K/UL (ref 4.3–11.1)
WBC URNS QL MICRO: NORMAL /HPF

## 2023-02-14 PROCEDURE — 6370000000 HC RX 637 (ALT 250 FOR IP): Performed by: PHYSICIAN ASSISTANT

## 2023-02-14 PROCEDURE — 85025 COMPLETE CBC W/AUTO DIFF WBC: CPT

## 2023-02-14 PROCEDURE — 87088 URINE BACTERIA CULTURE: CPT

## 2023-02-14 PROCEDURE — 96375 TX/PRO/DX INJ NEW DRUG ADDON: CPT

## 2023-02-14 PROCEDURE — 99285 EMERGENCY DEPT VISIT HI MDM: CPT

## 2023-02-14 PROCEDURE — 87086 URINE CULTURE/COLONY COUNT: CPT

## 2023-02-14 PROCEDURE — 80053 COMPREHEN METABOLIC PANEL: CPT

## 2023-02-14 PROCEDURE — 81001 URINALYSIS AUTO W/SCOPE: CPT

## 2023-02-14 PROCEDURE — 96374 THER/PROPH/DIAG INJ IV PUSH: CPT

## 2023-02-14 PROCEDURE — 6360000002 HC RX W HCPCS: Performed by: PHYSICIAN ASSISTANT

## 2023-02-14 PROCEDURE — 87186 SC STD MICRODIL/AGAR DIL: CPT

## 2023-02-14 PROCEDURE — 83690 ASSAY OF LIPASE: CPT

## 2023-02-14 PROCEDURE — 2580000003 HC RX 258: Performed by: PHYSICIAN ASSISTANT

## 2023-02-14 PROCEDURE — 6360000004 HC RX CONTRAST MEDICATION: Performed by: PHYSICIAN ASSISTANT

## 2023-02-14 PROCEDURE — 74177 CT ABD & PELVIS W/CONTRAST: CPT

## 2023-02-14 RX ORDER — HYDROCODONE BITARTRATE AND ACETAMINOPHEN 5; 325 MG/1; MG/1
1 TABLET ORAL EVERY 6 HOURS PRN
Qty: 6 TABLET | Refills: 0 | Status: SHIPPED | OUTPATIENT
Start: 2023-02-14 | End: 2023-02-17

## 2023-02-14 RX ORDER — NITROFURANTOIN 25; 75 MG/1; MG/1
100 CAPSULE ORAL 2 TIMES DAILY
Qty: 14 CAPSULE | Refills: 0 | Status: SHIPPED | OUTPATIENT
Start: 2023-02-14 | End: 2023-02-21

## 2023-02-14 RX ORDER — ONDANSETRON 2 MG/ML
4 INJECTION INTRAMUSCULAR; INTRAVENOUS ONCE
Status: COMPLETED | OUTPATIENT
Start: 2023-02-14 | End: 2023-02-14

## 2023-02-14 RX ORDER — HYDROCODONE BITARTRATE AND ACETAMINOPHEN 5; 325 MG/1; MG/1
1 TABLET ORAL
Status: COMPLETED | OUTPATIENT
Start: 2023-02-14 | End: 2023-02-14

## 2023-02-14 RX ORDER — SODIUM CHLORIDE 0.9 % (FLUSH) 0.9 %
5-40 SYRINGE (ML) INJECTION 2 TIMES DAILY
Status: DISCONTINUED | OUTPATIENT
Start: 2023-02-14 | End: 2023-02-14 | Stop reason: HOSPADM

## 2023-02-14 RX ORDER — KETOROLAC TROMETHAMINE 15 MG/ML
15 INJECTION, SOLUTION INTRAMUSCULAR; INTRAVENOUS ONCE
Status: COMPLETED | OUTPATIENT
Start: 2023-02-14 | End: 2023-02-14

## 2023-02-14 RX ORDER — 0.9 % SODIUM CHLORIDE 0.9 %
100 INTRAVENOUS SOLUTION INTRAVENOUS ONCE
Status: COMPLETED | OUTPATIENT
Start: 2023-02-14 | End: 2023-02-14

## 2023-02-14 RX ADMIN — SODIUM CHLORIDE 100 ML: 9 INJECTION, SOLUTION INTRAVENOUS at 16:45

## 2023-02-14 RX ADMIN — KETOROLAC TROMETHAMINE 15 MG: 15 INJECTION, SOLUTION INTRAMUSCULAR; INTRAVENOUS at 15:54

## 2023-02-14 RX ADMIN — ONDANSETRON 4 MG: 2 INJECTION INTRAMUSCULAR; INTRAVENOUS at 15:55

## 2023-02-14 RX ADMIN — HYDROCODONE BITARTRATE AND ACETAMINOPHEN 1 TABLET: 5; 325 TABLET ORAL at 18:06

## 2023-02-14 RX ADMIN — IOHEXOL 100 ML: 350 INJECTION, SOLUTION INTRAVENOUS at 16:45

## 2023-02-14 ASSESSMENT — LIFESTYLE VARIABLES
HOW OFTEN DO YOU HAVE A DRINK CONTAINING ALCOHOL: 2-4 TIMES A MONTH
HOW MANY STANDARD DRINKS CONTAINING ALCOHOL DO YOU HAVE ON A TYPICAL DAY: 1 OR 2

## 2023-02-14 ASSESSMENT — PAIN SCALES - GENERAL
PAINLEVEL_OUTOF10: 7
PAINLEVEL_OUTOF10: 7

## 2023-02-14 ASSESSMENT — ENCOUNTER SYMPTOMS
RESPIRATORY NEGATIVE: 1
ABDOMINAL PAIN: 1
NAUSEA: 1

## 2023-02-14 ASSESSMENT — PAIN DESCRIPTION - ORIENTATION: ORIENTATION: LEFT

## 2023-02-14 ASSESSMENT — PAIN - FUNCTIONAL ASSESSMENT: PAIN_FUNCTIONAL_ASSESSMENT: 0-10

## 2023-02-14 ASSESSMENT — PAIN DESCRIPTION - LOCATION: LOCATION: ABDOMEN

## 2023-02-14 NOTE — ED TRIAGE NOTES
Pt presents to the ER with  with c/o left sided flank pain x 1 day. Pt with hx of diverticulitis and kidney issues. Denies n/v/d. Reports hematuria. Denies bowel issues.

## 2023-02-14 NOTE — ED PROVIDER NOTES
Emergency Department Provider Note                   PCP:                Tom Chawla MD               Age: 63 y.o. Sex: female       ICD-10-CM    1. Left flank pain  R10.9 HYDROcodone-acetaminophen (NORCO) 5-325 MG per tablet      2. Acute cystitis without hematuria  N30.00 HYDROcodone-acetaminophen (NORCO) 5-325 MG per tablet          DISPOSITION Decision To Discharge 02/14/2023 06:43:27 PM        Medical Decision Making  Patient 65-year-old female who presents with left flank pain for 1 day. She does have history of bladder spasms and has bladder stimulator. She also self caths. Her work-up shows trace leuks in urine, blood work without significant abnormalities, and CT without acute abnormality. Discussed findings. Patient says she is very prone to urinary tract infections. We will culture her urine. She would like to go ahead and start her on an antibiotic. Will place on Macrobid. Will give short course of pain meds. She does have an appointment to see her doctor in the morning. Strict return precautions given for worsening or changing symptoms. Amount and/or Complexity of Data Reviewed  Labs: ordered. Radiology: ordered. Risk  Prescription drug management.                                 Orders Placed This Encounter   Procedures    Culture, Urine    CT ABDOMEN PELVIS W IV CONTRAST Additional Contrast? None    CBC with Diff    CMP    Lipase    Urinalysis w rflx microscopic    Urinalysis, Micro    Diet NPO    Saline lock IV        Medications   sodium chloride flush 0.9 % injection 5-40 mL (has no administration in time range)   ketorolac (TORADOL) injection 15 mg (15 mg IntraVENous Given 2/14/23 1554)   ondansetron (ZOFRAN) injection 4 mg (4 mg IntraVENous Given 2/14/23 1555)   0.9 % sodium chloride bolus (100 mLs IntraVENous New Bag 2/14/23 1645)   iohexol (OMNIPAQUE) 350 mg/mL (100 mLs IntraVENous Given 2/14/23 1645)   HYDROcodone-acetaminophen (NORCO) 5-325 MG per tablet 1 tablet (1 tablet Oral Given 2/14/23 1806)       New Prescriptions    HYDROCODONE-ACETAMINOPHEN (NORCO) 5-325 MG PER TABLET    Take 1 tablet by mouth every 6 hours as needed for Pain for up to 3 days. Intended supply: 3 days. Take lowest dose possible to manage pain Max Daily Amount: 4 tablets    NITROFURANTOIN, MACROCRYSTAL-MONOHYDRATE, (MACROBID) 100 MG CAPSULE    Take 1 capsule by mouth 2 times daily for 7 days        Mor Wood is a 63 y.o. female who presents to the Emergency Department with chief complaint of    Chief Complaint   Patient presents with    Flank Pain      Patient is a 62-year-old female with multiple chronic medical problems who presents with left lower abdominal pain for 1 day. She says it feels like prior kidney stones and a little like diverticulitis but has not had any diarrhea. Did note some blood in her urine. Some nausea. No fevers or dysuria. Has had increased urinary frequency. No vomiting. No other acute complaints. Review of Systems   Constitutional: Negative. HENT: Negative. Respiratory: Negative. Cardiovascular: Negative. Gastrointestinal:  Positive for abdominal pain and nausea. Genitourinary:  Positive for frequency and hematuria. All other systems reviewed and are negative.     Past Medical History:   Diagnosis Date    Anemia     has been anemic in past - <11; does not take meds or infusions     Arrhythmia     [facility] 2018 - aspirin and metoprolol     Arthritis     osteo     Asthma     under control with meds and inhalers and nebs no SOB - mainly exercise induced and allergy induced but uses daily inhaler     Chronic pain     from neck and shoulder     COVID     hx of covid - 9/2021 in [facility] - was not hospitalized at that time     Depression     trazodone     Diabetes ([facility] [address]     oral meds; A1C 1/28/2022 = 6.2; avg fasting blood sugar=    GERD (gastroesophageal reflux disease)     omeprazole taken as needed     Headache migraines takes maxalt as needed     Hypercholesterolemia     crestor at night     Hypertension     under control with meds     Lymphadenopathy     Bilat [id number]    Nausea & vomiting     one time after surgery at [facility] - was in great deal of pain - after bladder stimulator placement     Neurogenic bladder     Psychotic disorder ([address]     bipolar; meds taken under control     PUD (peptic ulcer disease)     hiatal hernia; takes omeprazole as needed OTC     Sleep apnea     wears cpap nightly         Past Surgical History:   Procedure Laterality Date    CERVICAL FUSION  04/12/2022    ACDF C5-6 DR. Haines    COLONOSCOPY      CYST REMOVAL      under left arm - no lymph nodes removed     GYN      SHAYLEE    HEENT      tonsils parotid    ORTHOPEDIC SURGERY Bilateral     bilat CTR     ROTATOR CUFF REPAIR Left     RCR     SHOULDER ARTHROSCOPY Right     shoulder scope to clean up the joint    UPPER GASTROINTESTINAL ENDOSCOPY      UROLOGICAL SURGERY      bladder stimulator-[place] x5        Family History   Problem Relation Age of Onset    Breast Cancer Neg Hx     Parkinson's Disease Father     Heart Disease Father     Diabetes Father     Hypertension Father         Social History     Socioeconomic History    Marital status:      Spouse name: None    Number of children: None    Years of education: None    Highest education level: None   Tobacco Use    Smoking status: Former    Smokeless tobacco: Never    Tobacco comments:     Quit smoking: college days only smoked 9 months   Substance and Sexual Activity    Alcohol use:  Yes     Alcohol/week: 1.0 standard drink    Drug use: Never         Crab extract allergy skin test, Cephalexin, Morphine, Morphine, Oxycodone-acetaminophen, Zolpidem, Adhesive tape, and Cephalexin     Previous Medications    ALBUTEROL (PROVENTIL) (2.5 MG/3ML) 0.083% NEBULIZER SOLUTION    Albuterol sulfate 2.5 mg/3 mL (0.083 %) solution for nebulization USE 1 VIAL VIA NEBULIZER TWICE DAILY IF NEEDED FOR ASTHMA EXACERBATION    ALBUTEROL SULFATE  (90 BASE) MCG/ACT INHALER    Inhale 2 puffs into the lungs every 4 hours as needed    AMOXICILLIN-CLAVULANATE (AUGMENTIN) 875-125 MG PER TABLET    TAKE 1 TABLET BY MOUTH TWICE DAILY FOR 10 DAYS    ASPIRIN 81 MG EC TABLET    Take 81 mg by mouth    BENZONATATE (TESSALON) 100 MG CAPSULE        CETIRIZINE HCL (ZYRTEC ALLERGY) 10 MG CAPS    Take by mouth daily    CHLORTHALIDONE (HYGROTON) 25 MG TABLET    Take 25 mg by mouth daily    CHLORTHALIDONE (HYGROTON) 25 MG TABLET    1 tablet in the morning with food Orally Once a day for 30 day(s)    CYANOCOBALAMIN 1000 MCG/ML INJECTION    Inject 1,000 mcg into the skin    CYCLOBENZAPRINE (FLEXERIL) 10 MG TABLET    Take 10 mg by mouth 3 times daily as needed    DESVENLAFAXINE SUCCINATE (PRISTIQ) 100 MG TB24 EXTENDED RELEASE TABLET    Take by mouth daily    DESVENLAFAXINE SUCCINATE (PRISTIQ) 50 MG TB24 EXTENDED RELEASE TABLET    2 tablet Orally Once a day    DICLOFENAC SODIUM (VOLTAREN) 1 % GEL        DOCUSATE (COLACE, DULCOLAX) 100 MG CAPS    Take 100 mg by mouth 3 times daily as needed    ERGOCALCIFEROL (ERGOCALCIFEROL) 1.25 MG (12744 UT) CAPSULE    Take 50,000 Units by mouth    ESTRADIOL (ESTRACE) 0.1 MG/GM VAGINAL CREAM        FLUTICASONE-SALMETEROL (ADVAIR HFA) 45-21 MCG/ACT INHALER    2 puffs Inhalation Twice a day    FLUTICASONE-SALMETEROL (ADVAIR) 250-50 MCG/DOSE AEPB    Advair Diskus 250 mcg-50 mcg/dose powder for inhalation Inhale 2 puffs twice a day by inhalation route for 90 days.     HYDROCORTISONE (ANUSOL-HC) 25 MG SUPPOSITORY    insert 1 suppository by rectal route  every day for 7 days then as needed    JANUMET -1000 MG TB24        LIDOCAINE (LIDODERM) 5 %    Lidoderm 5 % topical patch APPLY 1 PATCH BY TOPICAL ROUTE ONCE DAILY (MAY WEAR UP TO 12HOURS.)    LIDOCAINE (XYLOCAINE) 2 % JELLY        LISINOPRIL (PRINIVIL;ZESTRIL) 10 MG TABLET    Take 10 mg by mouth daily    LISINOPRIL (PRINIVIL;ZESTRIL) 10 MG TABLET    1 tablet Orally Once a day for 30 day(s)    LOSARTAN (COZAAR) 100 MG TABLET    Take 100 mg by mouth daily    METOPROLOL SUCCINATE (TOPROL XL) 100 MG EXTENDED RELEASE TABLET    Take by mouth daily    MUPIROCIN (BACTROBAN) 2 % OINTMENT    APPLY TOPICALLY TO BIOPSY SITE TWICE DAILY AFTER CLEANING FOR 7 DAYS    OMEPRAZOLE (PRILOSEC OTC) 20 MG TABLET    Take 20 mg by mouth daily    OMEPRAZOLE (PRILOSEC) 20 MG DELAYED RELEASE CAPSULE        ONDANSETRON (ZOFRAN) 4 MG TABLET    1-2 tabs, Oral, q6hr, PRN, 10 tabs, 0, 0, 04/15/21 15:34:00 EDT, Route to Pharmacy Electronically, [id number], 1-2 tabs Oral q6hr, x7 days, PRN:as needed for nausea/vomiting, as needed for nausea/vomiting, 172.72, cm, 03/12/. .. POLYETHYLENE GLYCOL (GLYCOLAX) 17 GM/SCOOP POWDER    Take by mouth as needed    POTASSIUM CHLORIDE (MICRO-K) 10 MEQ EXTENDED RELEASE CAPSULE    Take 10 mEq by mouth daily    PREGABALIN (LYRICA) 75 MG CAPSULE    Take 75 mg by mouth 2 times daily. PREGABALIN (LYRICA) 75 MG CAPSULE    Take 1 capsule twice a day for nerve pain    RIZATRIPTAN (MAXALT-MLT) 5 MG DISINTEGRATING TABLET    Take 5 mg by mouth    ROSUVASTATIN (CRESTOR) 20 MG TABLET    Take 20 mg by mouth    ROSUVASTATIN (CRESTOR) 40 MG TABLET    1 tablet Orally Once a day for 30 day(s)    SITAGLIPTAN-METFORMIN (JANUMET)  MG PER TABLET    Take 1 tablet by mouth daily (with breakfast)    TIZANIDINE (ZANAFLEX) 4 MG TABLET    Take 4 mg by mouth 3 times daily as needed    TIZANIDINE (ZANAFLEX) 4 MG TABLET    1 tablet as needed Orally Three times a day    TRAMADOL  MG TABS    Take 100 mg by mouth 2 times daily    TRAZODONE (DESYREL) 100 MG TABLET    Take 100 mg by mouth    TRAZODONE (DESYREL) 100 MG TABLET    1-2 tablet at bedtime Orally at bedtime    TRIAMCINOLONE (NASACORT) 55 MCG/ACT NASAL INHALER    2 sprays by Nasal route as needed        Vitals signs and nursing note reviewed.    Patient Vitals for the past 4 hrs:   Temp Pulse Resp BP SpO2 02/14/23 1528 97.7 °F (36.5 °C) 92 20 124/78 97 %          Physical Exam  Vitals and nursing note reviewed. Constitutional:       General: She is not in acute distress. Appearance: She is well-developed. She is obese. She is not ill-appearing or toxic-appearing. HENT:      Head: Normocephalic. Eyes:      Extraocular Movements: Extraocular movements intact. Cardiovascular:      Rate and Rhythm: Normal rate and regular rhythm. Pulses: Normal pulses. Heart sounds: Normal heart sounds. Pulmonary:      Effort: Pulmonary effort is normal.      Breath sounds: Normal breath sounds. Abdominal:      General: Bowel sounds are normal.      Palpations: Abdomen is soft. Tenderness: There is no abdominal tenderness. There is no guarding or rebound. Musculoskeletal:         General: Normal range of motion. Cervical back: Normal range of motion and neck supple. Skin:     General: Skin is warm and dry. Findings: No rash. Neurological:      General: No focal deficit present. Mental Status: She is alert. Mental status is at baseline. Psychiatric:         Mood and Affect: Mood normal.         Behavior: Behavior normal.         Thought Content: Thought content normal.        Procedures    Results for orders placed or performed during the hospital encounter of 02/14/23   CT ABDOMEN PELVIS W IV CONTRAST Additional Contrast? None    Narrative    EXAMINATION: CT SCAN OF THE ABDOMEN AND PELVIS WITH INTRAVENOUS CONTRAST    DATE OF EXAM: 2/14/2023 4:37 PM    HISTORY: LLQ pain    COMPARISON: None. TECHNIQUE: CT examination of the abdomen and pelvis was performed following the   intravenous administration of iodinated contrast. CT dose lowering techniques   were used, to include: automated exposure control, adjustment for patient size,   and/or use of iterative reconstruction. FINDINGS:    ABDOMEN/PELVIS:    Lower Chest: Normal.     Liver: Multiple small cysts within the liver. Gallbladder/Biliary: Normal.     Pancreas: Normal.     Spleen: Normal.     Adrenal Glands: Normal.     Kidneys: Normal.     GI Tract: Multiple diverticula within the sigmoid colon. Mesentery/Peritoneum: Normal.    Vasculature: Normal.     Lymph Nodes: Normal.     Abdominal Wall: Normal.     Bladder: Normal.     Reproductive: Hysterectomy. Musculoskeletal: Normal.        Impression    1. Colonic diverticulosis with no evidence of diverticulitis. Thank you for the referral of this patient. This exam was interpreted by an   American Board of Radiology certified radiologist with subspecialty training in   Body. If there are any questions regarding this exam please feel free to contact   a radiologist directly at [phone].       Freddy Blas M.D.   2/14/2023 6:30:00 PM   CBC with Diff   Result Value Ref Range    WBC 4.8 4.3 - 11.1 K/uL    RBC 4.25 4.05 - 5.20 M/uL    Hemoglobin 11.7 11.7 - 15.4 g/dL    Hematocrit 35.9 35.8 - 46.3 %    MCV 84.5 82.0 - 102.0 FL    MCH 27.5 26.1 - 32.9 PG    MCHC 32.6 31.4 - 35.0 g/dL    RDW 13.6 11.9 - 14.6 %    Platelets 881 437 - 388 K/uL    MPV 9.3 (L) 9.4 - 12.3 FL    nRBC 0.00 0.0 - 0.2 K/uL    Differential Type AUTOMATED      Seg Neutrophils 59 43 - 78 %    Lymphocytes 29 13 - 44 %    Monocytes 9 4.0 - 12.0 %    Eosinophils % 3 0.5 - 7.8 %    Basophils 0 0.0 - 2.0 %    Immature Granulocytes 0 0.0 - 5.0 %    Segs Absolute 2.9 1.7 - 8.2 K/UL    Absolute Lymph # 1.4 0.5 - 4.6 K/UL    Absolute Mono # 0.5 0.1 - 1.3 K/UL    Absolute Eos # 0.1 0.0 - 0.8 K/UL    Basophils Absolute 0.0 0.0 - 0.2 K/UL    Absolute Immature Granulocyte 0.0 0.0 - 0.5 K/UL   CMP   Result Value Ref Range    Sodium 141 133 - 143 mmol/L    Potassium 3.7 3.5 - 5.1 mmol/L    Chloride 103 98 - 107 mmol/L    CO2 28 21 - 32 mmol/L    Anion Gap 10 2 - 11 mmol/L    Glucose 98 65 - 100 mg/dL    BUN 17 7.0 - 18.0 MG/DL    Creatinine 0.70 0.6 - 1.0 MG/DL    Est, Glom Filt Rate >60 >60 ml/min/1.73m2    Calcium 9.3 8.3 - 10.4 MG/DL    Total Bilirubin 0.2 0.2 - 1.1 MG/DL    ALT 13 13.0 - 61.0 U/L    AST 15 15 - 37 U/L    Alk Phosphatase 104 45.0 - 117.0 U/L    Total Protein 7.0 6.4 - 8.2 g/dL    Albumin 4.3 3.2 - 4.6 g/dL    Globulin 2.7 (L) 2.8 - 4.5 g/dL    Albumin/Globulin Ratio 1.6 0.4 - 1.6     Lipase   Result Value Ref Range    Lipase 29 13 - 60 U/L   Urinalysis w rflx microscopic   Result Value Ref Range    Color, UA YELLOW      Appearance CLEAR      Specific Gravity, UA >=1.030 1.001 - 1.023    pH, Urine 5.5 5.0 - 9.0      Protein, UA 30 (A) NEG mg/dL    Glucose, UA Negative mg/dL    Ketones, Urine Negative NEG mg/dL    Bilirubin Urine Negative NEG      Blood, Urine Negative NEG      Urobilinogen, Urine 0.2 0.2 - 1.0 EU/dL    Nitrite, Urine Negative NEG      Leukocyte Esterase, Urine TRACE (A) NEG     Urinalysis, Micro   Result Value Ref Range    WBC, UA 3-5 0 /hpf    RBC, UA 3-5 0 /hpf    Epithelial Cells UA 0-3 0 /hpf    BACTERIA, URINE TRACE 0 /hpf    Casts 0-3 0 /lpf    Crystals 0 0 /LPF    Mucus, UA 0 0 /lpf    Other observations RESULTS VERIFIED MANUALLY          CT ABDOMEN PELVIS W IV CONTRAST Additional Contrast? None   Final Result      1. Colonic diverticulosis with no evidence of diverticulitis. Thank you for the referral of this patient. This exam was interpreted by an    American Board of Radiology certified radiologist with subspecialty training in    Body. If there are any questions regarding this exam please feel free to contact    a radiologist directly at [phone]. Mary Wong M.D.    2/14/2023 6:30:00 PM                          Voice dictation software was used during the making of this note. This software is not perfect and grammatical and other typographical errors may be present. This note has not been completely proofread for errors.        Karlee [place]  02/14/23 7316

## 2023-02-15 NOTE — ED NOTES
I have reviewed discharge instructions with the patient. The patient verbalized understanding. Patient left ED via Discharge Method: ambulatory to Home with self    Opportunity for questions and clarification provided. Patient given 1 scripts. To continue your aftercare when you leave the hospital, you may receive an automated call from our care team to check in on how you are doing. This is a free service and part of our promise to provide the best care and service to meet your aftercare needs.  If you have questions, or wish to unsubscribe from this service please call [phone]. Thank you for Choosing our [facility] Emergency Department.        Shree Abdullahi RN  02/14/23 1529

## 2023-02-17 LAB
BACTERIA SPEC CULT: ABNORMAL
SERVICE CMNT-IMP: ABNORMAL

## 2023-02-17 NOTE — PROGRESS NOTES
ED pharmacist reviewed recent results of urine culture. The patient received a prescription for nitrofurantion upon discharge. Based on culture results, the patient is being adequately treated for the identified infection with existing antimicrobial therapy. No further intervention needed. Allergies as of 02/14/2023 - Fully Reviewed 02/14/2023   Allergen Reaction Noted    Crab extract allergy skin test Diarrhea 10/08/2018    Cephalexin  07/03/2022    Morphine Other (See Comments) 02/16/2022    Morphine  07/03/2022    Oxycodone-acetaminophen  10/30/2022    Zolpidem Other (See Comments) 02/16/2022    Adhesive tape Rash 08/27/2020    Cephalexin Rash 02/16/2022     Alyssia Parada, PharmD.   Emergency Medicine Clinical Pharmacist

## 2023-02-22 ENCOUNTER — OFFICE VISIT (OUTPATIENT)
Dept: ORTHOPEDIC SURGERY | Age: 63
End: 2023-02-22

## 2023-02-22 DIAGNOSIS — M25.552 BILATERAL HIP PAIN: Primary | ICD-10-CM

## 2023-02-22 DIAGNOSIS — M25.551 BILATERAL HIP PAIN: Primary | ICD-10-CM

## 2023-02-22 RX ORDER — TRIAMCINOLONE ACETONIDE 40 MG/ML
40 INJECTION, SUSPENSION INTRA-ARTICULAR; INTRAMUSCULAR ONCE
Status: COMPLETED | OUTPATIENT
Start: 2023-02-22 | End: 2023-02-22

## 2023-02-22 RX ADMIN — TRIAMCINOLONE ACETONIDE 40 MG: 40 INJECTION, SUSPENSION INTRA-ARTICULAR; INTRAMUSCULAR at 14:59

## 2023-02-22 RX ADMIN — TRIAMCINOLONE ACETONIDE 40 MG: 40 INJECTION, SUSPENSION INTRA-ARTICULAR; INTRAMUSCULAR at 14:58

## 2023-02-22 NOTE — PROGRESS NOTES
Name: Sarahi Clinton  YOB: 1960  Gender: female  MRN: [id number]    CC:   Chief Complaint   Patient presents with    Hip Pain     Bilat          HPI:   The pain has been present for several months and is becoming worse. It hurts at night when sleeping. There was not an acute injury to the hip. The pain is located over the lateral thigh areas  It hurts to walk and pain worsens with increased distance. She is to have a right knee replacement with Dr. Juanis Smith soon  The pain does not radiate down the leg. Numbness and tingling are not noted. The patient is now having difficulty putting socks and shoes on. Treatment so far has been anti-inflammatory medications. Review of Systems  As per HPI. Pertinent positives and negatives are addressed with the patient, particularly those related to musculoskeletal concerns. Non-orthopaedic concerns were referred back to the primary care physician. [address] [place], [place] [address]way:    Current Outpatient Medications:     mupirocin (BACTROBAN) 2 % ointment, APPLY TOPICALLY TO BIOPSY SITE TWICE DAILY AFTER CLEANING FOR 7 DAYS, Disp: , Rfl:     benzonatate (TESSALON) 100 MG capsule, , Disp: , Rfl:     ondansetron (ZOFRAN) 4 MG tablet, 1-2 tabs, Oral, q6hr, PRN, 10 tabs, 0, 0, 04/15/21 15:34:00 EDT, Route to Pharmacy Electronically, [id number], 1-2 tabs Oral q6hr, x7 days, PRN:as needed for nausea/vomiting, as needed for nausea/vomiting, 172.72, cm, 03/12/. .., Disp: , Rfl:     losartan (COZAAR) 100 MG tablet, Take 100 mg by mouth daily, Disp: , Rfl:     amoxicillin-clavulanate (AUGMENTIN) 875-125 MG per tablet, TAKE 1 TABLET BY MOUTH TWICE DAILY FOR 10 DAYS, Disp: , Rfl:     estradiol (ESTRACE) 0.1 MG/GM vaginal cream, , Disp: , Rfl:     lidocaine (XYLOCAINE) 2 % jelly, , Disp: , Rfl:     hydrocortisone (ANUSOL-HC) 25 MG suppository, insert 1 suppository by rectal route  every day for 7 days then as needed, Disp: , Rfl:     diclofenac sodium (VOLTAREN) 1 % GEL, , Disp: , Rfl:     pregabalin (LYRICA) 75 MG capsule, Take 1 capsule twice a day for nerve pain, Disp: , Rfl:     cyclobenzaprine (FLEXERIL) 10 MG tablet, Take 10 mg by mouth 3 times daily as needed, Disp: , Rfl:     omeprazole (PRILOSEC) 20 MG delayed release capsule, , Disp: , Rfl:     JANUMET -1000 MG TB24, , Disp: , Rfl:     traMADol HCl 100 MG TABS, Take 100 mg by mouth 2 times daily, Disp: , Rfl:     pregabalin (LYRICA) 75 MG capsule, Take 75 mg by mouth 2 times daily. , Disp: , Rfl:     traZODone (DESYREL) 100 MG tablet, 1-2 tablet at bedtime Orally at bedtime, Disp: , Rfl:     chlorthalidone (HYGROTON) 25 MG tablet, 1 tablet in the morning with food Orally Once a day for 30 day(s), Disp: , Rfl:     desvenlafaxine succinate (PRISTIQ) 50 MG TB24 extended release tablet, 2 tablet Orally Once a day, Disp: , Rfl:     fluticasone-salmeterol (ADVAIR HFA) 45-21 MCG/ACT inhaler, 2 puffs Inhalation Twice a day, Disp: , Rfl:     lisinopril (PRINIVIL;ZESTRIL) 10 MG tablet, 1 tablet Orally Once a day for 30 day(s), Disp: , Rfl:     rosuvastatin (CRESTOR) 40 MG tablet, 1 tablet Orally Once a day for 30 day(s), Disp: , Rfl:     tiZANidine (ZANAFLEX) 4 MG tablet, 1 tablet as needed Orally Three times a day, Disp: , Rfl:     albuterol (PROVENTIL) (2.5 MG/3ML) 0.083% nebulizer solution, Albuterol sulfate 2.5 mg/3 mL (0.083 %) solution for nebulization USE 1 VIAL VIA NEBULIZER TWICE DAILY IF NEEDED FOR ASTHMA EXACERBATION, Disp: , Rfl:     albuterol sulfate  (90 Base) MCG/ACT inhaler, Inhale 2 puffs into the lungs every 4 hours as needed, Disp: , Rfl:     aspirin 81 MG EC tablet, Take 81 mg by mouth, Disp: , Rfl:     Cetirizine HCl (ZYRTEC ALLERGY) 10 MG CAPS, Take by mouth daily, Disp: , Rfl:     chlorthalidone (HYGROTON) 25 MG tablet, Take 25 mg by mouth daily, Disp: , Rfl:     cyanocobalamin 1000 MCG/ML injection, Inject 1,000 mcg into the skin, Disp: , Rfl:     desvenlafaxine succinate (PRISTIQ) 100 MG TB24 extended release tablet, Take by mouth daily, Disp: , Rfl:     docusate (COLACE, DULCOLAX) 100 MG CAPS, Take 100 mg by mouth 3 times daily as needed, Disp: , Rfl:     ergocalciferol (ERGOCALCIFEROL) 1.25 MG (08480 UT) capsule, Take 50,000 Units by mouth, Disp: , Rfl:     fluticasone-salmeterol (ADVAIR) 250-50 MCG/DOSE AEPB, Advair Diskus 250 mcg-50 mcg/dose powder for inhalation Inhale 2 puffs twice a day by inhalation route for 90 days. , Disp: , Rfl:     lidocaine (LIDODERM) 5 %, Lidoderm 5 % topical patch APPLY 1 PATCH BY TOPICAL ROUTE ONCE DAILY (MAY WEAR UP TO 12HOURS.), Disp: , Rfl:     lisinopril (PRINIVIL;ZESTRIL) 10 MG tablet, Take 10 mg by mouth daily, Disp: , Rfl:     metoprolol succinate (TOPROL XL) 100 MG extended release tablet, Take by mouth daily, Disp: , Rfl:     omeprazole (PRILOSEC OTC) 20 MG tablet, Take 20 mg by mouth daily, Disp: , Rfl:     polyethylene glycol (GLYCOLAX) 17 GM/SCOOP powder, Take by mouth as needed, Disp: , Rfl:     potassium chloride (MICRO-K) 10 MEQ extended release capsule, Take 10 mEq by mouth daily, Disp: , Rfl:     rizatriptan (MAXALT-MLT) 5 MG disintegrating tablet, Take 5 mg by mouth, Disp: , Rfl:     rosuvastatin (CRESTOR) 20 MG tablet, Take 20 mg by mouth, Disp: , Rfl:     sitaGLIPtan-metFORMIN (JANUMET)  MG per tablet, Take 1 tablet by mouth daily (with breakfast), Disp: , Rfl:     tiZANidine (ZANAFLEX) 4 MG tablet, Take 4 mg by mouth 3 times daily as needed, Disp: , Rfl:     traZODone (DESYREL) 100 MG tablet, Take 100 mg by mouth, Disp: , Rfl:     triamcinolone (NASACORT) 55 MCG/ACT nasal inhaler, 2 sprays by Nasal route as needed, Disp: , Rfl:   Allergies   Allergen Reactions    Crab Extract Allergy Skin Test Diarrhea    Cephalexin      Other reaction(s): itching    Morphine Other (See Comments)     Hallucinations, itching    Morphine      Other reaction(s): Unknown    Oxycodone-Acetaminophen      Other reaction(s): Hitch    Zolpidem Other (See Comments) Short term memory loss    Adhesive Tape Rash     Event:      Cephalexin Rash     itching     Past Medical History:   Diagnosis Date    Anemia     has been anemic in past - <11; does not take meds or infusions     Arrhythmia     [facility] 2018 - aspirin and metoprolol     Arthritis     osteo     Asthma     under control with meds and inhalers and nebs no SOB - mainly exercise induced and allergy induced but uses daily inhaler     Chronic pain     from neck and shoulder     COVID     hx of covid - 9/2021 in [facility] - was not hospitalized at that time     Depression     trazodone     Diabetes ([facility] [address]     oral meds; A1C 1/28/2022 = 6.2; avg fasting blood sugar=    GERD (gastroesophageal reflux disease)     omeprazole taken as needed     Headache     migraines takes maxalt as needed     Hypercholesterolemia     crestor at night     Hypertension     under control with meds     Lymphadenopathy     Bilat 1713-0460    Nausea & vomiting     one time after surgery at [facility] - was in great deal of pain - after bladder stimulator placement     Neurogenic bladder     Psychotic disorder ([facility] [address]     bipolar; meds taken under control     PUD (peptic ulcer disease)     hiatal hernia; takes omeprazole as needed OTC     Sleep apnea     wears cpap nightly      . pmh  Past Surgical History:   Procedure Laterality Date    CERVICAL FUSION  04/12/2022    ACDF C5-6 DR. Haines    COLONOSCOPY      CYST REMOVAL      under left arm - no lymph nodes removed     GYN      SHAYLEE    HEENT      tonsils parotid    ORTHOPEDIC SURGERY Bilateral     bilat [facility]     ROTATOR CUFF REPAIR Left     RCR     SHOULDER ARTHROSCOPY Right     shoulder scope to clean up the joint    UPPER GASTROINTESTINAL ENDOSCOPY      UROLOGICAL SURGERY      bladder stimulator-[place] x5     Family History   Problem Relation Age of Onset    Breast Cancer Neg Hx     Parkinson's Disease Father     Heart Disease Father     Diabetes Father     Hypertension Father      Social History     Socioeconomic History    Marital status:      Spouse name: Not on file    Number of children: Not on file    Years of education: Not on file    Highest education level: Not on file   Occupational History    Not on file   Tobacco Use    Smoking status: Former    Smokeless tobacco: Never    Tobacco comments:     Quit smoking: college days only smoked 9 months   Substance and Sexual Activity    Alcohol use: Yes     Alcohol/week: 1.0 standard drink    Drug use: Never    Sexual activity: Not on file   Other Topics Concern    Not on file   Social History Narrative    Not on file     Social Determinants of Health     Financial Resource Strain: Not on file   Food Insecurity: Not on file   Transportation Needs: Not on file   Physical Activity: Not on file   Stress: Not on file   Social Connections: Not on file   Intimate Partner Violence: Not on file   Housing Stability: Not on file                  PHYSICAL EXAMINATION:   The patient is alert and oriented, in no distress. The cervical, thoracic and lumbar spine are without compromising deformity. The upper extremities demonstrate reasonable tone, strength and function bilaterally. The lower extremities are as described below. Circulation is normal with palpable pedal pulses bilaterally and no edema. Skin of the leg is normal with no chronic stasis disease bilaterally. Sensation is intact to light touch bilaterally. Gait is noted to be with a slight trendelenburg and antalgia. bilateral hip range of motion is 0-90 degrees of flexion and 20 degrees on abduction and adduction. There is 15 degrees internal rotation and 25 degrees of external rotation with no pain in groin     Limb lengths are equal.  Straight leg test is negative bilaterally. Stability is normal bilaterally. Muscular strength is intact bilaterally. There is no lymphadenopathy in the groin or popliteal regions bilaterally.   Their judgment and insight are normal.  They are oriented to time, place and person. Their memory is good and the mood and affect appropriate. XRAYS:   AP pelvis and lateral views of hip are reviewed. There is no joint space loss, eburnated bone and osteophyte formation of the hip. X-ray impression:  Normal bilateral  hip    AP and lateral views of the lumbar spine reveal reveal lumbar DJD. X-ray impression:  Lumbar spine DJD    IMPRESSION:     Diagnosis Orders   1. Bilateral hip pain  XR HIP 3-4 VW W PELVIS BILATERAL    XR LUMBAR SPINE (2-3 VIEWS)          RECOMMENDATION:    X-rays were reviewed with the patient today. We discussed the fact that her hips are completely normal.  She does have degenerative changes in her spine. She is also done well and responded to bursal injections in the past.    1 of Medrol into Marcaine was placed into the left and right hip bursal area. Hopefully this will allow her to be up and functional.  She is where she could have some overlay from her spine. She also is aware that Dr. Nadege Santos who will fix her knee also does hips and she certainly can see him for her orthopedic conditions as we do the same treatments. Approximately 30 minutes was spent reviewing the medical record, imaging, performing history and physical examination, discussing the diagnosis and treatment plan with the patient, and completing documentation for this visit.

## 2023-04-25 ENCOUNTER — HOSPITAL ENCOUNTER (OUTPATIENT)
Dept: PHYSICAL THERAPY | Age: 63
Setting detail: RECURRING SERIES
Discharge: HOME | End: 2023-04-28
Payer: MEDICARE

## 2023-04-25 PROCEDURE — 97161 PT EVAL LOW COMPLEX 20 MIN: CPT

## 2023-04-25 PROCEDURE — 97140 MANUAL THERAPY 1/> REGIONS: CPT

## 2023-04-25 PROCEDURE — 97110 THERAPEUTIC EXERCISES: CPT

## 2023-04-25 ASSESSMENT — PAIN SCALES - GENERAL: PAINLEVEL_OUTOF10: 6

## 2023-04-25 NOTE — THERAPY EVALUATION
provided by therapist.   Patient will report no more than 3/10 right pain at rest in order to demonstrate improved self pain control and tolerance and improve quality of sleep. Patient will be educated in and demonstrate improved upright posture to assist with correct mechanics with stand and walking. Patient will be educated in and demonstrate proper squat lift technique in order to reduce stress on right knee, improve safety, and reduce risk of injury. Patient will improve knee flexion to 105 to assist dressing and bathing. Patient will improve knee extension to 0 degrees to assist with return to correct weight bearing and ambulation. Discharge Goals: Time Frame: 4/25/2023 to 7/24/2023  Patient will improve knee flexion to 120 degrees to assist with bending, squatting, and lifting. Patient will improve knee strength to 4/5 to assist with return to cooking, cleaning. Patient will be able to complete all transfers with 50% or less use of UE to improve safety and daily performance of all activities. Patient will improve single leg balance to 10 seconds or greater to improve ability stairs, steps, curbs. Patient will improve KOOS, JR by 50%. Outcome Measure: Tool Used: Knee injury and Osteoarthritis Outcome Score for Joint Replacement (KOOS, JR)  Score:  Initial: 21 (Interval: 34.174) 4/25/2023 Most Recent: TBD   Interpretation of Score: The KOOS, JR contains 7 items from the original KOOS survey. Items are coded from 0 to 4, none to extreme respectively. Calvin Stroud is scored by summing the raw response (range 0-28) and then converting it to an interval score using the table provided below. The interval score ranges from 0 to 100 where 0 represents total knee disability and 100 represents perfect knee health.     Medical Necessity:   > Patient is expected to demonstrate progress in strength, range of motion, balance, coordination, and functional technique to decreased pain and improve function

## 2023-04-25 NOTE — PROGRESS NOTES
Ron Munson  : 1960  Primary: Medicare Part A And B (Medicare)  Secondary: Moise Johnson @ [address] [facility] [address]  Phone: [phone]  Fax: [phone] Plan Frequency: 2 times per week for 90 days (As appropriate transitioning to 1 time per week based symptoms, and functional progression.)    Plan of Care/Certification Expiration Date: 23      >PT Visit Info:  Plan Frequency: 2 times per week for 90 days (As appropriate transitioning to 1 time per week based symptoms, and functional progression.)  Plan of Care/Certification Expiration Date: 23      Visit Count:  1    OUTPATIENT PHYSICAL THERAPY:OP NOTE TYPE: Treatment Note 2023       Episode  }Appt Desk             Treatment Diagnosis:  Pain in Right Knee (M25.561)  Stiffness of Right Knee, Not elsewhere classified (M25.661)  Difficulty in walking, Not elsewhere classified (R26.2)  Other abnormalities of gait and mobility (R26.89)  Generalized Muscle Weakness (M62.81)  Presence of right artificial knee joint (W79.300)  Medical/Referring Diagnosis:  Presence of right artificial knee joint [N19.776]  Referring Physician:  Juice Sandhu MD MD Orders:  PT Eval and Treat   Date of Onset:  Onset Date: 23 (Date of Surgery 4/3/2023)     Allergies:   Crab extract allergy skin test, Cephalexin, Morphine, Morphine, Oxycodone-acetaminophen, Zolpidem, Adhesive tape, and Cephalexin  Restrictions/Precautions:  Restrictions/Precautions: General Precautions (WBAT)  Patient Instructed Per Patient to Wear Knee Sleeve/Brace For Knee Support (At this time)     Interventions Planned (Treatment may consist of any combination of the following):    Current Treatment Recommendations: Strengthening; ROM; Balance training; Functional mobility training; ADL/Self-care training; IADL training;  Endurance training; Gait training; Stair training; Neuromuscular re-education;

## 2023-04-27 ENCOUNTER — HOSPITAL ENCOUNTER (OUTPATIENT)
Dept: PHYSICAL THERAPY | Age: 63
Setting detail: RECURRING SERIES
Discharge: HOME | End: 2023-04-30
Payer: MEDICARE

## 2023-04-27 PROCEDURE — 97140 MANUAL THERAPY 1/> REGIONS: CPT

## 2023-04-27 PROCEDURE — 97110 THERAPEUTIC EXERCISES: CPT

## 2023-04-27 ASSESSMENT — PAIN SCALES - GENERAL: PAINLEVEL_OUTOF10: 4

## 2023-05-03 ENCOUNTER — HOSPITAL ENCOUNTER (OUTPATIENT)
Dept: PHYSICAL THERAPY | Age: 63
Setting detail: RECURRING SERIES
Discharge: HOME | End: 2023-05-06
Payer: MEDICARE

## 2023-05-03 PROCEDURE — 97140 MANUAL THERAPY 1/> REGIONS: CPT

## 2023-05-03 PROCEDURE — 97110 THERAPEUTIC EXERCISES: CPT

## 2023-05-03 ASSESSMENT — PAIN SCALES - GENERAL: PAINLEVEL_OUTOF10: 3

## 2023-05-03 NOTE — PROGRESS NOTES
Kushal Romano  : 1960  Primary: Medicare Part A And B (Medicare)  Secondary: Moise Johnson @ [address] [facility] [address]  Phone: [phone]  Fax: [phone] Plan Frequency: 2 times per week for 90 days (As appropriate transitioning to 1 time per week based symptoms, and functional progression.)    Plan of Care/Certification Expiration Date: 23      >PT Visit Info:  Plan Frequency: 2 times per week for 90 days (As appropriate transitioning to 1 time per week based symptoms, and functional progression.)  Plan of Care/Certification Expiration Date: 23      Visit Count:  3    OUTPATIENT PHYSICAL THERAPY:OP NOTE TYPE: Treatment Note 5/3/2023       Episode  }Appt Desk             Treatment Diagnosis:  Pain in Right Knee (M25.561)  Stiffness of Right Knee, Not elsewhere classified (M25.661)  Difficulty in walking, Not elsewhere classified (R26.2)  Other abnormalities of gait and mobility (R26.89)  Generalized Muscle Weakness (M62.81)  Presence of right artificial knee joint (V91.616)  Medical/Referring Diagnosis:  Presence of right artificial knee joint [A82.056]  Referring Physician:  Inez Boudreaux MD MD Orders:  PT Eval and Treat   Date of Onset:  Onset Date: 23 (Date of Surgery 4/3/2023)     Allergies:   Crab extract allergy skin test, Cephalexin, Morphine, Morphine, Oxycodone-acetaminophen, Zolpidem, Adhesive tape, and Cephalexin  Restrictions/Precautions:  Restrictions/Precautions: General Precautions (WBAT)  Patient Instructed Per Patient to Wear Knee Sleeve/Brace For Knee Support (At this time)     Interventions Planned (Treatment may consist of any combination of the following):    Current Treatment Recommendations: Strengthening; ROM; Balance training; Functional mobility training; ADL/Self-care training; IADL training;  Endurance training; Gait training; Stair training; Neuromuscular re-education;

## 2023-05-05 ENCOUNTER — HOSPITAL ENCOUNTER (OUTPATIENT)
Dept: PHYSICAL THERAPY | Age: 63
Setting detail: RECURRING SERIES
Discharge: HOME | End: 2023-05-08
Payer: MEDICARE

## 2023-05-05 PROCEDURE — 97110 THERAPEUTIC EXERCISES: CPT

## 2023-05-05 PROCEDURE — 97140 MANUAL THERAPY 1/> REGIONS: CPT

## 2023-05-05 ASSESSMENT — PAIN SCALES - GENERAL: PAINLEVEL_OUTOF10: 0

## 2023-05-10 ENCOUNTER — HOSPITAL ENCOUNTER (OUTPATIENT)
Dept: PHYSICAL THERAPY | Age: 63
Setting detail: RECURRING SERIES
Discharge: HOME | End: 2023-05-13
Payer: MEDICARE

## 2023-05-10 PROCEDURE — 97110 THERAPEUTIC EXERCISES: CPT

## 2023-05-10 PROCEDURE — 97140 MANUAL THERAPY 1/> REGIONS: CPT

## 2023-05-10 ASSESSMENT — PAIN SCALES - GENERAL: PAINLEVEL_OUTOF10: 3

## 2023-05-10 NOTE — PROGRESS NOTES
Migel Heart  : 1960  Primary: Medicare Part A And B (Medicare)  Secondary: Moise Johnson @ [address] [facility] [address]  Phone: [phone]  Fax: [phone] Plan Frequency: 2 times per week for 90 days (As appropriate transitioning to 1 time per week based symptoms, and functional progression.)    Plan of Care/Certification Expiration Date: 23      >PT Visit Info:  Plan Frequency: 2 times per week for 90 days (As appropriate transitioning to 1 time per week based symptoms, and functional progression.)  Plan of Care/Certification Expiration Date: 23      Visit Count:  5    OUTPATIENT PHYSICAL THERAPY:OP NOTE TYPE: Treatment Note 5/10/2023       Episode  }Appt Desk             Treatment Diagnosis:  Pain in Right Knee (M25.561)  Stiffness of Right Knee, Not elsewhere classified (M25.661)  Difficulty in walking, Not elsewhere classified (R26.2)  Other abnormalities of gait and mobility (R26.89)  Generalized Muscle Weakness (M62.81)  Presence of right artificial knee joint (N48.023)  Medical/Referring Diagnosis:  Presence of right artificial knee joint [N07.531]  Referring Physician:  Navneet Marquez MD MD Orders:  PT Eval and Treat   Date of Onset:  Onset Date: 23 (Date of Surgery 4/3/2023)     Allergies:   Crab extract allergy skin test, Cephalexin, Morphine, Morphine, Oxycodone-acetaminophen, Zolpidem, Adhesive tape, and Cephalexin  Restrictions/Precautions:  Restrictions/Precautions: General Precautions (WBAT)  Patient Instructed Per Patient to Wear Knee Sleeve/Brace For Knee Support (At this time)     Interventions Planned (Treatment may consist of any combination of the following):    Current Treatment Recommendations: Strengthening; ROM; Balance training; Functional mobility training; ADL/Self-care training; IADL training;  Endurance training; Gait training; Stair training; Neuromuscular re-education;

## 2023-05-12 ENCOUNTER — HOSPITAL ENCOUNTER (OUTPATIENT)
Dept: PHYSICAL THERAPY | Age: 63
Setting detail: RECURRING SERIES
Discharge: HOME | End: 2023-05-15
Payer: MEDICARE

## 2023-05-12 PROCEDURE — 97110 THERAPEUTIC EXERCISES: CPT

## 2023-05-12 PROCEDURE — 97140 MANUAL THERAPY 1/> REGIONS: CPT

## 2023-05-12 ASSESSMENT — PAIN SCALES - GENERAL: PAINLEVEL_OUTOF10: 5

## 2023-05-15 ENCOUNTER — HOSPITAL ENCOUNTER (OUTPATIENT)
Dept: PHYSICAL THERAPY | Age: 63
Setting detail: RECURRING SERIES
Discharge: HOME | End: 2023-05-18
Payer: MEDICARE

## 2023-05-15 PROCEDURE — 97140 MANUAL THERAPY 1/> REGIONS: CPT

## 2023-05-15 PROCEDURE — 97110 THERAPEUTIC EXERCISES: CPT

## 2023-05-15 ASSESSMENT — PAIN SCALES - GENERAL: PAINLEVEL_OUTOF10: 4

## 2023-05-15 NOTE — PROGRESS NOTES
Leigh Ann Munson  : 1960  Primary: Medicare Part A And B (Medicare)  Secondary: Moise Johnson @ [address] [facility] [address]  Phone: [phone]  Fax: [phone] Plan Frequency: 2 times per week for 90 days (As appropriate transitioning to 1 time per week based symptoms, and functional progression.)    Plan of Care/Certification Expiration Date: 23      >PT Visit Info:  Plan Frequency: 2 times per week for 90 days (As appropriate transitioning to 1 time per week based symptoms, and functional progression.)  Plan of Care/Certification Expiration Date: 23      Visit Count:  7    OUTPATIENT PHYSICAL THERAPY:OP NOTE TYPE: Treatment Note 5/15/2023       Episode  }Appt Desk             Treatment Diagnosis:  Pain in Right Knee (M25.561)  Stiffness of Right Knee, Not elsewhere classified (M25.661)  Difficulty in walking, Not elsewhere classified (R26.2)  Other abnormalities of gait and mobility (R26.89)  Generalized Muscle Weakness (M62.81)  Presence of right artificial knee joint (I99.902)  Medical/Referring Diagnosis:  Presence of right artificial knee joint [P10.643]  Referring Physician:  Nuha Cardona MD MD Orders:  PT Eval and Treat   Date of Onset:  Onset Date: 23 (Date of Surgery 4/3/2023)     Allergies:   Crab extract allergy skin test, Cephalexin, Morphine, Morphine, Oxycodone-acetaminophen, Zolpidem, Adhesive tape, and Cephalexin  Restrictions/Precautions:  Restrictions/Precautions: General Precautions (WBAT)  Patient Instructed Per Patient to Wear Knee Sleeve/Brace For Knee Support (At this time)     Interventions Planned (Treatment may consist of any combination of the following):    Current Treatment Recommendations: Strengthening; ROM; Balance training; Functional mobility training; ADL/Self-care training; IADL training;  Endurance training; Gait training; Stair training; Neuromuscular re-education;

## 2023-05-15 NOTE — PROGRESS NOTES
Cassy Munson  : 1960  Primary: Medicare Part A And B (Medicare)  Secondary: Moise Johnson @ [address] [facility] [address]  Phone: [phone]  Fax: [phone] Plan Frequency: 2 times per week for 90 days (As appropriate transitioning to 1 time per week based symptoms, and functional progression.)    Plan of Care/Certification Expiration Date: 23      >PT Visit Info:  Plan Frequency: 2 times per week for 90 days (As appropriate transitioning to 1 time per week based symptoms, and functional progression.)  Plan of Care/Certification Expiration Date: 23      Visit Count:  7    OUTPATIENT PHYSICAL THERAPY:OP NOTE TYPE: Treatment Note 5/15/2023       Episode  }Appt Desk             Treatment Diagnosis:  Pain in Right Knee (M25.561)  Stiffness of Right Knee, Not elsewhere classified (M25.661)  Difficulty in walking, Not elsewhere classified (R26.2)  Other abnormalities of gait and mobility (R26.89)  Generalized Muscle Weakness (M62.81)  Presence of right artificial knee joint (D28.715)  Medical/Referring Diagnosis:  Presence of right artificial knee joint [R54.259]  Referring Physician:  Kike Hernadez MD MD Orders:  PT Eval and Treat   Date of Onset:  Onset Date: 23 (Date of Surgery 4/3/2023)     Allergies:   Crab extract allergy skin test, Cephalexin, Morphine, Morphine, Oxycodone-acetaminophen, Zolpidem, Adhesive tape, and Cephalexin  Restrictions/Precautions:  Restrictions/Precautions: General Precautions (WBAT)  Patient Instructed Per Patient to Wear Knee Sleeve/Brace For Knee Support (At this time)     Interventions Planned (Treatment may consist of any combination of the following):    Current Treatment Recommendations: Strengthening; ROM; Balance training; Functional mobility training; ADL/Self-care training; IADL training;  Endurance training; Gait training; Stair training; Neuromuscular re-education;

## 2023-05-17 ENCOUNTER — HOSPITAL ENCOUNTER (OUTPATIENT)
Dept: PHYSICAL THERAPY | Age: 63
Setting detail: RECURRING SERIES
Discharge: HOME | End: 2023-05-20
Payer: MEDICARE

## 2023-05-17 PROCEDURE — 97140 MANUAL THERAPY 1/> REGIONS: CPT

## 2023-05-17 PROCEDURE — 97110 THERAPEUTIC EXERCISES: CPT

## 2023-05-17 ASSESSMENT — PAIN SCALES - GENERAL: PAINLEVEL_OUTOF10: 5

## 2023-05-17 NOTE — PROGRESS NOTES
Emily Munson  : 1960  Primary: Medicare Part A And B (Medicare)  Secondary: Moise Johnson @ [address] [facility] [address]  Phone: [phone]  Fax: [phone] Plan Frequency: 2 times per week for 90 days (As appropriate transitioning to 1 time per week based symptoms, and functional progression.)    Plan of Care/Certification Expiration Date: 23      >PT Visit Info:  Plan Frequency: 2 times per week for 90 days (As appropriate transitioning to 1 time per week based symptoms, and functional progression.)  Plan of Care/Certification Expiration Date: 23      Visit Count:  8    OUTPATIENT PHYSICAL THERAPY:OP NOTE TYPE: Treatment Note 2023       Episode  }Appt Desk             Treatment Diagnosis:  Pain in Right Knee (M25.561)  Stiffness of Right Knee, Not elsewhere classified (M25.661)  Difficulty in walking, Not elsewhere classified (R26.2)  Other abnormalities of gait and mobility (R26.89)  Generalized Muscle Weakness (M62.81)  Presence of right artificial knee joint (V11.018)  Medical/Referring Diagnosis:  Presence of right artificial knee joint [Q78.840]  Referring Physician:  Alfredito Mckee MD MD Orders:  PT Eval and Treat   Date of Onset:  Onset Date: 23 (Date of Surgery 4/3/2023)     Allergies:   Crab extract allergy skin test, Cephalexin, Morphine, Morphine, Oxycodone-acetaminophen, Zolpidem, Adhesive tape, and Cephalexin  Restrictions/Precautions:  Restrictions/Precautions: General Precautions (WBAT)  Patient Instructed Per Patient to Wear Knee Sleeve/Brace For Knee Support (At this time)     Interventions Planned (Treatment may consist of any combination of the following):    Current Treatment Recommendations: Strengthening; ROM; Balance training; Functional mobility training; ADL/Self-care training; IADL training;  Endurance training; Gait training; Stair training; Neuromuscular re-education;

## 2023-05-22 ENCOUNTER — HOSPITAL ENCOUNTER (OUTPATIENT)
Dept: PHYSICAL THERAPY | Age: 63
Setting detail: RECURRING SERIES
Discharge: HOME | End: 2023-05-25
Payer: MEDICARE

## 2023-05-22 PROCEDURE — 97110 THERAPEUTIC EXERCISES: CPT

## 2023-05-22 PROCEDURE — 97140 MANUAL THERAPY 1/> REGIONS: CPT

## 2023-05-22 ASSESSMENT — PAIN SCALES - GENERAL: PAINLEVEL_OUTOF10: 4

## 2023-05-22 NOTE — PROGRESS NOTES
Oanh Munson  : 1960  Primary: Medicare Part A And B (Medicare)  Secondary: Moise Johnson @ [address] [facility] [address]  Phone: [phone]  Fax: [phone] Plan Frequency: 2 times per week for 90 days (As appropriate transitioning to 1 time per week based symptoms, and functional progression.)    Plan of Care/Certification Expiration Date: 23      >PT Visit Info:  Plan Frequency: 2 times per week for 90 days (As appropriate transitioning to 1 time per week based symptoms, and functional progression.)  Plan of Care/Certification Expiration Date: 23      Visit Count:  9    OUTPATIENT PHYSICAL THERAPY:OP NOTE TYPE: Treatment Note 2023       Episode  }Appt Desk             Treatment Diagnosis:  Pain in Right Knee (M25.561)  Stiffness of Right Knee, Not elsewhere classified (M25.661)  Difficulty in walking, Not elsewhere classified (R26.2)  Other abnormalities of gait and mobility (R26.89)  Generalized Muscle Weakness (M62.81)  Presence of right artificial knee joint (A49.244)  Medical/Referring Diagnosis:  Presence of right artificial knee joint [Q79.459]  Referring Physician:  Jude Gil MD MD Orders:  PT Eval and Treat   Date of Onset:  Onset Date: 23 (Date of Surgery 4/3/2023)     Allergies:   Crab extract allergy skin test, Cephalexin, Morphine, Morphine, Oxycodone-acetaminophen, Zolpidem, Adhesive tape, and Cephalexin  Restrictions/Precautions:  Restrictions/Precautions: General Precautions (WBAT)  Patient Instructed Per Patient to Wear Knee Sleeve/Brace For Knee Support (At this time)     Interventions Planned (Treatment may consist of any combination of the following):    Current Treatment Recommendations: Strengthening; ROM; Balance training; Functional mobility training; ADL/Self-care training; IADL training;  Endurance training; Gait training; Stair training; Neuromuscular re-education;

## 2023-05-22 NOTE — PROGRESS NOTES
Mary Rios  : 1960  Primary: Medicare Part A And B (Medicare)  Secondary: Moise Johnson @ [address] [facility] [address]  Phone: [phone]  Fax: [phone] Plan Frequency: 2 times per week for 90 days (As appropriate transitioning to 1 time per week based symptoms, and functional progression.)  Plan of Care/Certification Expiration Date: 23      PT Visit Info:  Plan Frequency: 2 times per week for 90 days (As appropriate transitioning to 1 time per week based symptoms, and functional progression.)  Plan of Care/Certification Expiration Date: 23      Visit Count:  9                OUTPATIENT PHYSICAL THERAPY:             OP NOTE TYPE: Progress Report 2023               Episode (Right TKA) Appt Desk         Treatment Diagnosis:  Pain in Right Knee (M25.561)  Stiffness of Right Knee, Not elsewhere classified (M25.661)  Difficulty in walking, Not elsewhere classified (R26.2)  Other abnormalities of gait and mobility (R26.89)  Generalized Muscle Weakness (M62.81)  Presence of right artificial knee joint (N40.449)  Medical/Referring Diagnosis:  Presence of right artificial knee joint [O03.389]  Referring Physician:  Roselinda Goodpasture, MD MD Orders:  PT Eval and Treat   Return MD Appt:  2023  Date of Onset:  Onset Date: 23 (Date of Surgery 4/3/2023)    Sprained Right MCL on 2023  Allergies:  Crab extract allergy skin test, Cephalexin, Morphine, Morphine, Oxycodone-acetaminophen, Zolpidem, Adhesive tape, and Cephalexin  Restrictions/Precautions:    Restrictions/Precautions: General Precautions (WBAT)  Patient Instructed Per Patient to Wear Knee Sleeve/Brace For Knee Support (At this time)      Medications Last Reviewed:  2023      OBJECTIVE     Patient denies any LE paresthesia. Patient denies any increase of symptoms with cough, sneeze or valsalva.  Patient denies any saddle paresthesia or

## 2023-05-24 ENCOUNTER — HOSPITAL ENCOUNTER (OUTPATIENT)
Dept: PHYSICAL THERAPY | Age: 63
Setting detail: RECURRING SERIES
Discharge: HOME | End: 2023-05-27
Payer: MEDICARE

## 2023-05-24 PROCEDURE — 97110 THERAPEUTIC EXERCISES: CPT

## 2023-05-24 PROCEDURE — 97140 MANUAL THERAPY 1/> REGIONS: CPT

## 2023-05-24 ASSESSMENT — PAIN SCALES - GENERAL: PAINLEVEL_OUTOF10: 6

## 2023-05-24 NOTE — PROGRESS NOTES
Chan Munson  : 1960  Primary: Medicare Part A And B (Medicare)  Secondary: Moise Johnson @ [address] [facility] [address]  Phone: [phone]  Fax: [phone] Plan Frequency: 2 times per week for 90 days (As appropriate transitioning to 1 time per week based symptoms, and functional progression.)    Plan of Care/Certification Expiration Date: 23      >PT Visit Info:  Plan Frequency: 2 times per week for 90 days (As appropriate transitioning to 1 time per week based symptoms, and functional progression.)  Plan of Care/Certification Expiration Date: 23      Visit Count:  10    OUTPATIENT PHYSICAL THERAPY:OP NOTE TYPE: Treatment Note 2023       Episode  }Appt Desk             Treatment Diagnosis:  Pain in Right Knee (M25.561)  Stiffness of Right Knee, Not elsewhere classified (M25.661)  Difficulty in walking, Not elsewhere classified (R26.2)  Other abnormalities of gait and mobility (R26.89)  Generalized Muscle Weakness (M62.81)  Presence of right artificial knee joint (V15.016)  Medical/Referring Diagnosis:  Presence of right artificial knee joint [X59.467]  Referring Physician:  Mica Hobbs MD MD Orders:  PT Eval and Treat   Date of Onset:  Onset Date: 23 (Date of Surgery 4/3/2023)     Allergies:   Crab extract allergy skin test, Cephalexin, Morphine, Morphine, Oxycodone-acetaminophen, Zolpidem, Adhesive tape, and Cephalexin  Restrictions/Precautions:  Restrictions/Precautions: General Precautions (WBAT)  Patient Instructed Per Patient to Wear Knee Sleeve/Brace For Knee Support (At this time)     Interventions Planned (Treatment may consist of any combination of the following):    Current Treatment Recommendations: Strengthening; ROM; Balance training; Functional mobility training; ADL/Self-care training; IADL training;  Endurance training; Gait training; Stair training; Neuromuscular re-education;

## 2023-05-31 ENCOUNTER — HOSPITAL ENCOUNTER (OUTPATIENT)
Dept: PHYSICAL THERAPY | Age: 63
Setting detail: RECURRING SERIES
Discharge: HOME | End: 2023-06-03
Payer: MEDICARE

## 2023-05-31 PROCEDURE — 97110 THERAPEUTIC EXERCISES: CPT

## 2023-05-31 PROCEDURE — 97140 MANUAL THERAPY 1/> REGIONS: CPT

## 2023-05-31 ASSESSMENT — PAIN SCALES - GENERAL: PAINLEVEL_OUTOF10: 5

## 2023-05-31 NOTE — PROGRESS NOTES
x 10 6 inch Bilateral UE Support  3 x 10 6 inch Bilateral UE Support  3 x 10 6 inch Bilateral UE Support     Time spent with patient reviewing proper muscle recruitment and technique with exercises. MANUAL THERAPY: (10 minutes):   Joint mobilization and Soft tissue mobilization was utilized and necessary because of the patient's restricted joint motion, painful spasm, loss of articular motion, and restricted motion of soft tissue. Patellofemoral Grade II/III All Directions  Soft Tissue Mobilization: Quadriceps, Adductor, Hamstrings    MODALITIES: (0 minutes):        None Today      HEP: As above; handouts given to patient for all exercises. Treatment/Session Summary:    >Treatment Assessment:    Patient demonstrated anterior interval tightness and tenderness. Patient required verbal cues for posture and form. She continues to have quad, hip flexor, and hip strength deficits in general. She would benefit from continued progression of strength and stability to progress functionally. Communication/Consultation:   HEP Verbal Review  Equipment provided today:  None  Recommendations/Intent for next treatment session: Next visit will focus on mobility, flexibility, ROM, muscle activation, strength, balance, and function.     >Total Treatment Billable Duration:  55 minutes   Time In: 0900  Time Out: 1000    Trino Garrett PT       Charge Capture  }Post Session Pain  PT Visit Info  Med[employer] Portal  MD Guidelines  Scanned Media  Benefits  MyChart    Future Appointments   Date Time Provider Rosaura Montelongo   6/2/2023  8:00 AM Trino Garrett PT [facility]   6/6/2023  9:00 AM Trino Garrett PT [facility]   6/8/2023  9:00 AM CHERELLE Keene [facility]   6/14/2023  9:00 AM Trino Garrett PT [facility]   6/16/2023  9:00 AM Trino Garrett PT [facility]   9/5/2023  9:00 AM Trino Garrett PT [facility]

## 2023-06-02 ENCOUNTER — HOSPITAL ENCOUNTER (OUTPATIENT)
Dept: PHYSICAL THERAPY | Age: 63
Setting detail: RECURRING SERIES
Discharge: HOME | End: 2023-06-05
Payer: MEDICARE

## 2023-06-02 PROCEDURE — 97110 THERAPEUTIC EXERCISES: CPT

## 2023-06-02 PROCEDURE — 97140 MANUAL THERAPY 1/> REGIONS: CPT

## 2023-06-02 ASSESSMENT — PAIN SCALES - GENERAL: PAINLEVEL_OUTOF10: 4

## 2023-06-05 ENCOUNTER — HOSPITAL ENCOUNTER (OUTPATIENT)
Dept: PHYSICAL THERAPY | Age: 63
Setting detail: RECURRING SERIES
Discharge: HOME | End: 2023-06-08
Payer: MEDICARE

## 2023-06-05 PROCEDURE — 97110 THERAPEUTIC EXERCISES: CPT

## 2023-06-05 PROCEDURE — 97140 MANUAL THERAPY 1/> REGIONS: CPT

## 2023-06-05 ASSESSMENT — PAIN SCALES - GENERAL: PAINLEVEL_OUTOF10: 4

## 2023-06-05 NOTE — PROGRESS NOTES
Manual; Pain management; Return to work related activity; Home exercise program; Safety education & training; Patient/Caregiver education & training; Equipment evaluation, education, & procurement; Modalities; Positioning; Dry needling; Therapeutic activities     >Subjective Comments:   Patient reports that she went to baseball game with her son on Friday and then again on Sunday. She reports her knee and leg feels very sore and heavy. She reports that she did wear her brace, except when propping it up.  >Initial:     4/10>Post Session:       2/10  Medications Last Reviewed:  6/5/2023  Updated Objective Findings:   AROM: 0 to 124 deg Pre  Treatment   THERAPEUTIC EXERCISE: (45 minutes):    Exercises per grid below to improve mobility, strength, balance, and coordination. Required moderate visual, verbal, manual, and tactile cues to promote proper body alignment, promote proper body posture, promote proper body mechanics, and promote proper body breathing techniques. Progressed resistance, range, repetitions, and complexity of movement as indicated.    Date:  6/5/2023 Date:  5/31/2023 Date:  6/2/2023   Activity/Exercise Parameters Parameters Parameters   Knee Extension 10 minutes Heel Prop Supine 10 minutes Heel Prop Supine   10 minutes Heel Prop Supine   Knee Flexion Passive Supine 5 x 10 Therapist    Passive Supine 5 x 10 Therapist    Passive Supine 5 x 10 Therapist    Calf Stretch 3 x 60 sec Slantboard Slant Board 5 x 60 sec 3 x 60 sec Slant Board   Quad Set 20 sec x 10 10 sec x 20 20 sec x 10   Straight leg raise 3 x 10 cues for mechanics and muscle activation 3 x 15 cues for mechanics and muscle activation 3 x 15 cues for mechanics and muscle activation   Heel/toe raise      Walking       Sit to Stand 3 x 10 Mat Table 3 x 10 Mat Table 3 x 10 Mat Table   Wobble Board    3 x 15 DF/PF Finger Tip Assist   Hamstring Stretch      Nu Step 10 minutes Level 1 Working on ROM, Correct WB, and To Avoid Genu Valgus 10

## 2023-06-15 ENCOUNTER — HOSPITAL ENCOUNTER (OUTPATIENT)
Dept: PHYSICAL THERAPY | Age: 63
Setting detail: RECURRING SERIES
Discharge: HOME | End: 2023-06-18
Payer: MEDICARE

## 2023-06-15 PROCEDURE — 97110 THERAPEUTIC EXERCISES: CPT

## 2023-06-15 ASSESSMENT — PAIN SCALES - GENERAL: PAINLEVEL_OUTOF10: 0

## 2023-06-16 ENCOUNTER — HOSPITAL ENCOUNTER (OUTPATIENT)
Dept: PHYSICAL THERAPY | Age: 63
Setting detail: RECURRING SERIES
Discharge: HOME | End: 2023-06-19
Payer: MEDICARE

## 2023-06-16 PROCEDURE — 97110 THERAPEUTIC EXERCISES: CPT

## 2023-06-16 ASSESSMENT — PAIN SCALES - GENERAL: PAINLEVEL_OUTOF10: 0

## 2023-09-05 ENCOUNTER — HOSPITAL ENCOUNTER (OUTPATIENT)
Dept: PHYSICAL THERAPY | Age: 63
Setting detail: RECURRING SERIES
End: 2023-09-05
Payer: MEDICARE

## 2023-09-05 NOTE — PROGRESS NOTES
Physical Therapy  [address] at [facility] 9/5/2023    Patient is still out of town.       Brayan Watkins PT, DPT, TPS

## 2023-09-15 ENCOUNTER — HOSPITAL ENCOUNTER (OUTPATIENT)
Dept: PHYSICAL THERAPY | Age: 63
Setting detail: RECURRING SERIES
Discharge: HOME | End: 2023-09-18
Payer: MEDICARE

## 2023-09-15 PROCEDURE — 97110 THERAPEUTIC EXERCISES: CPT

## 2023-09-15 ASSESSMENT — PAIN SCALES - GENERAL: PAINLEVEL_OUTOF10: 2

## 2023-09-15 NOTE — PROGRESS NOTES
9:00 AM Rubens Horton, PT [facility]   9/27/2023  9:00 AM Rubens Horton PT [facility]   10/4/2023  9:00 AM Rubens Horton, CHERELLE [facility]

## 2023-09-20 ENCOUNTER — HOSPITAL ENCOUNTER (OUTPATIENT)
Dept: PHYSICAL THERAPY | Age: 63
Setting detail: RECURRING SERIES
Discharge: HOME | End: 2023-09-23
Payer: MEDICARE

## 2023-09-20 PROCEDURE — 97110 THERAPEUTIC EXERCISES: CPT

## 2023-09-20 ASSESSMENT — PAIN SCALES - GENERAL: PAINLEVEL_OUTOF10: 0

## 2023-09-20 NOTE — PROGRESS NOTES
Long Sitting     Nu Step 10 minutes Level 1 Working on ROM, Correct WB, and To Avoid Genu Valgus 10 minutes Level 1 Working on ROM, Correct WB, and To Avoid Genu Valgus 10 minutes Level 1 Working on ROM, Correct WB, and To Avoid Genu Valgus   Step Up  3 x 10 6 inch Bilateral UE Support  3 x 10 6 inch Bilateral UE Support     Time spent with patient reviewing proper muscle recruitment and technique with exercises. MANUAL THERAPY: (0 minutes):   Joint mobilization and Soft tissue mobilization was utilized and necessary because of the patient's restricted joint motion, painful spasm, loss of articular motion, and restricted motion of soft tissue. Patellofemoral Grade II/III All Directions  Soft Tissue Mobilization: Quadriceps, Adductor, Hamstrings    MODALITIES: (0 minutes):        *  Cold Pack Therapy in order to provide analgesia, relieve muscle spasm, and reduce inflammation and edema. (NO CHARGE) (SKIN INTACT PRE AND POST)     HEP: As above; handouts given to patient for all exercises. Treatment/Session Summary:    >Treatment Assessment:    Patient continues to have difficulty with consistent SLR with solid knee extension. She required verbal and tactile cues for posture, form, and mechanics, specifically for knee extension. Patient also was challenged with both static and dynamic balance. Patient continues to have deficits in these areas. Her HEP was reviewed. Patient would benefit from continued progression of strength, stability, both statically and dynamically. Communication/Consultation:   HEP  Equipment provided today:  None  Recommendations/Intent for next treatment session: Next visit will focus on mobility, flexibility, ROM, muscle activation, strength, balance, and function.     >Total Treatment Billable Duration:  53 minutes   Time In: 0854  Time Out: 0954    Anil Roberts PT       Charge Capture  }Post Session Pain  PT Visit Info  [employer] Portal  MD Guidelines  Scanned Media  Benefits

## 2023-09-27 ENCOUNTER — HOSPITAL ENCOUNTER (OUTPATIENT)
Dept: PHYSICAL THERAPY | Age: 63
Setting detail: RECURRING SERIES
End: 2023-09-27
Payer: MEDICARE

## 2023-09-27 NOTE — PROGRESS NOTES
Physical Therapy  [address] at [facility] 9/27/2023    Patient cancelled secondary to kidney infection.       Jennifer Krause PT, DPT, TPS

## 2023-10-04 ENCOUNTER — HOSPITAL ENCOUNTER (OUTPATIENT)
Dept: PHYSICAL THERAPY | Age: 63
Setting detail: RECURRING SERIES
Discharge: HOME | End: 2023-10-07
Payer: MEDICARE

## 2023-10-04 PROCEDURE — 97110 THERAPEUTIC EXERCISES: CPT

## 2023-10-04 ASSESSMENT — PAIN SCALES - GENERAL: PAINLEVEL_OUTOF10: 0

## 2023-10-04 NOTE — PROGRESS NOTES
Zain Gary  : 1960  Primary: Medicare Part A And B (Medicare)  Secondary: [address] @ [address] [place] [address]  Phone: [phone]  Fax: [phone] Plan Frequency: 2 times per week for 60 days (Transitioning to 1 time per week based on symptoms, functional progression.)    Plan of Care/Certification Expiration Date: 23      >PT Visit Info:  Plan Frequency: 2 times per week for 60 days (Transitioning to 1 time per week based on symptoms, functional progression.)  Plan of Care/Certification Expiration Date: 23      Visit Count:  19    OUTPATIENT PHYSICAL THERAPY:OP NOTE TYPE: Treatment Note 10/4/2023       Episode  }Appt Desk             Treatment Diagnosis:  Pain in Right Knee (M25.561)  Stiffness of Right Knee, Not elsewhere classified (M25.661)  Difficulty in walking, Not elsewhere classified (R26.2)  Other abnormalities of gait and mobility (R26.89)  Generalized Muscle Weakness (M62.81)  Presence of right artificial knee joint (M30.816)  Medical/Referring Diagnosis:  Presence of right artificial knee joint [Y12.965]  Referring Physician:  Jesika Mayen MD MD Orders:  PT Eval and Treat   Date of Onset:  Onset Date: 23 (Date of Surgery 4/3/2023)     Allergies:   Crab extract allergy skin test, Cephalexin, Morphine, Morphine, Oxycodone-acetaminophen, Zolpidem, Adhesive tape, and Cephalexin  Restrictions/Precautions:  Restrictions/Precautions: General Precautions (WBAT)  Patient Instructed Per Patient to Wear Knee Sleeve/Brace For Knee Support (At this time)     Interventions Planned (Treatment may consist of any combination of the following):    Current Treatment Recommendations: Strengthening; ROM; Balance training; Functional mobility training; ADL/Self-care training; IADL training;  Endurance training; Gait training; Stair training; Neuromuscular re-education; Manual; Pain management; Return to

## 2023-10-04 NOTE — THERAPY DISCHARGE
Grecia Gary  : 1960  Primary: Medicare Part A And B (Medicare)  Secondary: [address] @ [address] [facility] [address]  Phone: [phone]  Fax: [phone] Plan Frequency: 2 times per week for 60 days (Transitioning to 1 time per week based on symptoms, functional progression.)  Plan of Care/Certification Expiration Date: 23      PT Visit Info:  Plan Frequency: 2 times per week for 60 days (Transitioning to 1 time per week based on symptoms, functional progression.)  Plan of Care/Certification Expiration Date: 23      Visit Count:  19                OUTPATIENT PHYSICAL THERAPY:             Discharge Summary 10/4/2023               Episode (Right TKA) Appt Desk         Treatment Diagnosis:  Pain in Right Knee (M25.561)  Stiffness of Right Knee, Not elsewhere classified (M25.661)  Difficulty in walking, Not elsewhere classified (R26.2)  Other abnormalities of gait and mobility (R26.89)  Generalized Muscle Weakness (M62.81)  Presence of right artificial knee joint (U05.081)  Medical/Referring Diagnosis:  Presence of right artificial knee joint [W99.130]  Referring Physician:  Ron Murray MD MD Orders:  PT Eval and Treat   Return MD Appt:  2023  Date of Onset:  Onset Date: 23 (Date of Surgery 4/3/2023)    Sprained Right MCL on 2023  Allergies:  Crab extract allergy skin test, Cephalexin, Morphine, Morphine, Oxycodone-acetaminophen, Zolpidem, Adhesive tape, and Cephalexin  Restrictions/Precautions:    Restrictions/Precautions: General Precautions (WBAT)  Patient Instructed Per Patient to Wear Knee Sleeve/Brace For Knee Support (At this time)      Medications Last Reviewed:  10/4/2023      OBJECTIVE     Patient denies any LE paresthesia. Patient denies any increase of symptoms with cough, sneeze or valsalva. Patient denies any saddle paresthesia or bowel/bladder deficits.

## 2023-10-31 ENCOUNTER — TELEPHONE (OUTPATIENT)
Age: 63
End: 2023-10-31

## 2023-10-31 ENCOUNTER — OFFICE VISIT (OUTPATIENT)
Age: 63
End: 2023-10-31
Payer: MEDICARE

## 2023-10-31 VITALS
WEIGHT: 197 LBS | SYSTOLIC BLOOD PRESSURE: 100 MMHG | HEIGHT: 68 IN | DIASTOLIC BLOOD PRESSURE: 50 MMHG | BODY MASS INDEX: 29.86 KG/M2 | HEART RATE: 75 BPM

## 2023-10-31 DIAGNOSIS — R06.09 DOE (DYSPNEA ON EXERTION): ICD-10-CM

## 2023-10-31 DIAGNOSIS — R07.2 CHEST PAIN, PRECORDIAL: ICD-10-CM

## 2023-10-31 DIAGNOSIS — Z76.89 ENCOUNTER TO ESTABLISH CARE: ICD-10-CM

## 2023-10-31 DIAGNOSIS — I48.0 PAROXYSMAL ATRIAL FIBRILLATION (HCC): Primary | ICD-10-CM

## 2023-10-31 PROCEDURE — 3078F DIAST BP <80 MM HG: CPT | Performed by: INTERNAL MEDICINE

## 2023-10-31 PROCEDURE — G8484 FLU IMMUNIZE NO ADMIN: HCPCS | Performed by: INTERNAL MEDICINE

## 2023-10-31 PROCEDURE — G8427 DOCREV CUR MEDS BY ELIG CLIN: HCPCS | Performed by: INTERNAL MEDICINE

## 2023-10-31 PROCEDURE — 3074F SYST BP LT 130 MM HG: CPT | Performed by: INTERNAL MEDICINE

## 2023-10-31 PROCEDURE — G8417 CALC BMI ABV UP PARAM F/U: HCPCS | Performed by: INTERNAL MEDICINE

## 2023-10-31 PROCEDURE — 99204 OFFICE O/P NEW MOD 45 MIN: CPT | Performed by: INTERNAL MEDICINE

## 2023-10-31 PROCEDURE — 93000 ELECTROCARDIOGRAM COMPLETE: CPT | Performed by: INTERNAL MEDICINE

## 2023-10-31 RX ORDER — NITROGLYCERIN 0.4 MG/1
0.4 TABLET SUBLINGUAL EVERY 5 MIN PRN
Qty: 25 TABLET | Refills: 3 | Status: SHIPPED | OUTPATIENT
Start: 2023-10-31

## 2023-10-31 RX ORDER — LAMOTRIGINE 25 MG/1
TABLET ORAL
COMMUNITY
Start: 2023-03-25

## 2023-10-31 RX ORDER — MELOXICAM 15 MG/1
TABLET ORAL
COMMUNITY
Start: 2023-09-25

## 2023-10-31 RX ORDER — LORAZEPAM 0.5 MG/1
TABLET ORAL
COMMUNITY
Start: 2023-10-06

## 2023-10-31 ASSESSMENT — ENCOUNTER SYMPTOMS
ABDOMINAL PAIN: 0
APHONIA: 0
COUGH: 0
NAIL CHANGES: 0
STRIDOR: 0
EYE PAIN: 0

## 2023-10-31 NOTE — PROGRESS NOTES
fibrillation ([address])  -     EKG 12 Lead - Clinic Performed    Encounter to establish care  -     EKG 12 Lead - Clinic Performed    ALEX (dyspnea on exertion)  -     Nuclear stress test with myocardial perfusion; Future  -     Echo (TTE) complete (PRN contrast/bubble/strain/3D); Future    Chest pain, precordial    Other orders  -     nitroGLYCERIN (NITROSTAT) 0.4 MG SL tablet; Place 1 tablet under the tongue every 5 minutes as needed for Chest pain up to max of 3 total doses. If no relief after 1 dose, call 911. Return in about 1 month (around 11/30/2023). Thank you for allowing me to participate in this patient's care. Please call or contact me if there are any questions or concerns regarding the above.       Merribeth Hamman, MD  10/31/23  3:17 PM      Proofread, but unrecognized errors may exist.

## 2023-10-31 NOTE — TELEPHONE ENCOUNTER
Pt checked out after appt.  wants to see her back in 4 weeks (nov) timeframe. Echo booked 12/15/23 and follow up availability isn't until 1/23/24. Pt leaves the country on 12/8/23 and wants to know if appt/tests can wait until return or if we need to pull into the next 2 weeks.

## 2023-11-01 NOTE — TELEPHONE ENCOUNTER
We will schedule nuclear stress test and appointment before the patient leaves.   The echo can be done at a later time

## 2023-11-01 NOTE — TELEPHONE ENCOUNTER
Called pt and relayed Dr. Bynum All message. Pt voiced understanding. Pt stated that she will be leaving December 9th and that she will return mid to late January.

## 2023-11-03 NOTE — TELEPHONE ENCOUNTER
Called pt and scheduled for followup nuclear stress test 12/5/23. Let pt know that we will keep in touch and followup sooner if necessary. Pt voiced understanding.

## 2023-12-05 ENCOUNTER — HOSPITAL ENCOUNTER (OUTPATIENT)
Dept: CT IMAGING | Age: 63
Discharge: HOME | End: 2023-12-07
Attending: INTERNAL MEDICINE
Payer: MEDICARE

## 2023-12-05 ENCOUNTER — OFFICE VISIT (OUTPATIENT)
Age: 63
End: 2023-12-05
Payer: MEDICARE

## 2023-12-05 ENCOUNTER — TELEPHONE (OUTPATIENT)
Age: 63
End: 2023-12-05

## 2023-12-05 VITALS
DIASTOLIC BLOOD PRESSURE: 64 MMHG | HEIGHT: 68 IN | HEART RATE: 88 BPM | BODY MASS INDEX: 29.7 KG/M2 | SYSTOLIC BLOOD PRESSURE: 98 MMHG | WEIGHT: 196 LBS

## 2023-12-05 DIAGNOSIS — R06.09 DOE (DYSPNEA ON EXERTION): ICD-10-CM

## 2023-12-05 DIAGNOSIS — I20.0 ACCELERATING ANGINA (HCC): ICD-10-CM

## 2023-12-05 DIAGNOSIS — R07.2 CHEST PAIN, PRECORDIAL: ICD-10-CM

## 2023-12-05 DIAGNOSIS — I49.9 IRREGULAR HEART RHYTHM: Primary | ICD-10-CM

## 2023-12-05 DIAGNOSIS — I20.9 ANGINA, CLASS III (HCC): ICD-10-CM

## 2023-12-05 LAB
ANION GAP SERPL CALC-SCNC: 7 MMOL/L (ref 2–11)
BASOPHILS # BLD: 0 K/UL (ref 0–0.2)
BASOPHILS NFR BLD: 1 % (ref 0–2)
BUN SERPL-MCNC: 25 MG/DL (ref 8–23)
CALCIUM SERPL-MCNC: 9.6 MG/DL (ref 8.3–10.4)
CHLORIDE SERPL-SCNC: 101 MMOL/L (ref 103–113)
CO2 SERPL-SCNC: 28 MMOL/L (ref 21–32)
CREAT BLD-MCNC: 0.83 MG/DL (ref 0.8–1.5)
CREAT SERPL-MCNC: 1 MG/DL (ref 0.6–1)
DIFFERENTIAL METHOD BLD: NORMAL
EOSINOPHIL # BLD: 0.1 K/UL (ref 0–0.8)
EOSINOPHIL NFR BLD: 2 % (ref 0.5–7.8)
ERYTHROCYTE [DISTWIDTH] IN BLOOD BY AUTOMATED COUNT: 13.9 % (ref 11.9–14.6)
GLUCOSE SERPL-MCNC: 95 MG/DL (ref 65–100)
HCT VFR BLD AUTO: 37.3 % (ref 35.8–46.3)
HGB BLD-MCNC: 11.9 G/DL (ref 11.7–15.4)
IMM GRANULOCYTES # BLD AUTO: 0 K/UL (ref 0–0.5)
IMM GRANULOCYTES NFR BLD AUTO: 0 % (ref 0–5)
LYMPHOCYTES # BLD: 2.1 K/UL (ref 0.5–4.6)
LYMPHOCYTES NFR BLD: 41 % (ref 13–44)
MCH RBC QN AUTO: 26.7 PG (ref 26.1–32.9)
MCHC RBC AUTO-ENTMCNC: 31.9 G/DL (ref 31.4–35)
MCV RBC AUTO: 83.6 FL (ref 82–102)
MONOCYTES # BLD: 0.4 K/UL (ref 0.1–1.3)
MONOCYTES NFR BLD: 8 % (ref 4–12)
NEUTS SEG # BLD: 2.3 K/UL (ref 1.7–8.2)
NEUTS SEG NFR BLD: 48 % (ref 43–78)
NRBC # BLD: 0 K/UL (ref 0–0.2)
PLATELET # BLD AUTO: 270 K/UL (ref 150–450)
PMV BLD AUTO: 10.6 FL (ref 9.4–12.3)
POTASSIUM SERPL-SCNC: 3.8 MMOL/L (ref 3.5–5.1)
RBC # BLD AUTO: 4.46 M/UL (ref 4.05–5.2)
SODIUM SERPL-SCNC: 136 MMOL/L (ref 136–146)
TSH W FREE THYROID IF ABNORMAL: 1.61 UIU/ML (ref 0.36–3.74)
WBC # BLD AUTO: 5 K/UL (ref 4.3–11.1)

## 2023-12-05 PROCEDURE — 6360000004 HC RX CONTRAST MEDICATION: Performed by: INTERNAL MEDICINE

## 2023-12-05 PROCEDURE — 3078F DIAST BP <80 MM HG: CPT | Performed by: INTERNAL MEDICINE

## 2023-12-05 PROCEDURE — G8417 CALC BMI ABV UP PARAM F/U: HCPCS | Performed by: INTERNAL MEDICINE

## 2023-12-05 PROCEDURE — 71260 CT THORAX DX C+: CPT

## 2023-12-05 PROCEDURE — 3017F COLORECTAL CA SCREEN DOC REV: CPT | Performed by: INTERNAL MEDICINE

## 2023-12-05 PROCEDURE — G8428 CUR MEDS NOT DOCUMENT: HCPCS | Performed by: INTERNAL MEDICINE

## 2023-12-05 PROCEDURE — 1036F TOBACCO NON-USER: CPT | Performed by: INTERNAL MEDICINE

## 2023-12-05 PROCEDURE — 3074F SYST BP LT 130 MM HG: CPT | Performed by: INTERNAL MEDICINE

## 2023-12-05 PROCEDURE — G8484 FLU IMMUNIZE NO ADMIN: HCPCS | Performed by: INTERNAL MEDICINE

## 2023-12-05 PROCEDURE — 82565 ASSAY OF CREATININE: CPT

## 2023-12-05 PROCEDURE — 99214 OFFICE O/P EST MOD 30 MIN: CPT | Performed by: INTERNAL MEDICINE

## 2023-12-05 RX ADMIN — IOPAMIDOL 100 ML: 755 INJECTION, SOLUTION INTRAVENOUS at 12:15

## 2023-12-05 ASSESSMENT — ENCOUNTER SYMPTOMS
APHONIA: 0
COUGH: 0
STRIDOR: 0
EYE PAIN: 0
NAIL CHANGES: 0
ABDOMINAL PAIN: 0

## 2023-12-05 NOTE — TELEPHONE ENCOUNTER
----- Message from Erika Ramirez MD sent at 12/5/2023 12:56 PM EST -----  Please let patient know CT scan showed no pulmonary embolus. Please let her know okay to proceed to have her echocardiogram this afternoon.

## 2023-12-05 NOTE — PROGRESS NOTES
[address] [facility], [facility], [address]  PHONE: [phone]    SUBJECTIVE:   Colby Jain is a 66 y.o. female 1960   seen for a consultation visit regarding the following:     Chief Complaint   Patient presents with    Results     Nuke           . History of present illness: 66 y.o. female dyspnea on exertion unchanged since last appointment. Patient previously participating in diving and physical activities. She began having dyspnea on exertion in summer 2023. This followed knee surgery. She presented for follow-up to evaluate nuclear stress perfusion study results today normal perfusion but patient had very short exercise time. Ambulated down hallway today with heart rates in excess of 120 bpm with desaturation to 93% oxygen. Interval history:  history of atrial fibrillation documented at the [address] [place] [address] Street of [address] [facility] patient lives in [place] part-time  is a  at [employer] in [place].  She described episodes of dyspnea on exertion when in [facility] as well as chest discomfort. She has a previous history of atrial fibrillation was diagnosed by a physician in [place] based on her nighttime heart monitoring done for SARI. Father with CAD  [facility]ut [facility]     Cardiac History:  10/31/2023 EKG sinus rhythm low voltage  11/17/2023 nuclear stress perfusion study low risk normal perfusion      Assessment:  Dyspnea on exertion  Patient extremely short of breath with minimal activity ambulated down hallway with oxygen desaturation tachycardia severe dyspnea on exertion with minimal activity  Stress perfusion study normal however very diminished functional capacity and low exercise time making the possibility of balanced ischemia.    CT scan performed with no evidence of severe lung disease or PE  Echocardiogram with normal left ventricular systolic function  Due to her profound symptoms as well as decreased functional

## 2023-12-06 PROBLEM — I20.0 ACCELERATING ANGINA (HCC): Status: ACTIVE | Noted: 2023-12-05

## 2023-12-06 NOTE — PROGRESS NOTES
Patient pre-assessment complete for [facility] scheduled for 23, arrival time 0900. Patient verified using . Patient instructed to bring a list of all home medications on the day of procedure. NPO status reinforced. Patient informed to take a full dose aspirin 325mg  or 81 mg x 4 on the day of procedure. Patient instructed to [address] [facility]. Instructed they can take all other medications excluding vitamins & supplements. Patient verbalizes understanding of all instructions & denies any questions at this time.

## 2023-12-07 ENCOUNTER — HOSPITAL ENCOUNTER (OUTPATIENT)
Age: 63
Setting detail: OUTPATIENT SURGERY
Discharge: HOME | End: 2023-12-07
Attending: INTERNAL MEDICINE | Admitting: INTERNAL MEDICINE
Payer: MEDICARE

## 2023-12-07 VITALS
DIASTOLIC BLOOD PRESSURE: 60 MMHG | RESPIRATION RATE: 18 BRPM | BODY MASS INDEX: 29.7 KG/M2 | SYSTOLIC BLOOD PRESSURE: 95 MMHG | HEIGHT: 68 IN | WEIGHT: 196 LBS | OXYGEN SATURATION: 100 % | HEART RATE: 84 BPM | TEMPERATURE: 97.8 F

## 2023-12-07 DIAGNOSIS — I20.0 ACCELERATING ANGINA (HCC): ICD-10-CM

## 2023-12-07 DIAGNOSIS — R07.2 PRECORDIAL CHEST PAIN: Primary | ICD-10-CM

## 2023-12-07 LAB
ECHO BSA: 2.07 M2
EKG ATRIAL RATE: 70 BPM
EKG DIAGNOSIS: NORMAL
EKG P AXIS: 32 DEGREES
EKG P-R INTERVAL: 180 MS
EKG Q-T INTERVAL: 408 MS
EKG QRS DURATION: 94 MS
EKG QTC CALCULATION (BAZETT): 440 MS
EKG R AXIS: 4 DEGREES
EKG T AXIS: 8 DEGREES
EKG VENTRICULAR RATE: 70 BPM
GLUCOSE BLD STRIP.AUTO-MCNC: 135 MG/DL (ref 65–100)
SERVICE CMNT-IMP: ABNORMAL

## 2023-12-07 PROCEDURE — 2709999900 HC NON-CHARGEABLE SUPPLY: Performed by: INTERNAL MEDICINE

## 2023-12-07 PROCEDURE — 93460 R&L HRT ART/VENTRICLE ANGIO: CPT | Performed by: INTERNAL MEDICINE

## 2023-12-07 PROCEDURE — 99152 MOD SED SAME PHYS/QHP 5/>YRS: CPT | Performed by: INTERNAL MEDICINE

## 2023-12-07 PROCEDURE — C1751 CATH, INF, PER/CENT/MIDLINE: HCPCS | Performed by: INTERNAL MEDICINE

## 2023-12-07 PROCEDURE — 2580000003 HC RX 258: Performed by: INTERNAL MEDICINE

## 2023-12-07 PROCEDURE — 82962 GLUCOSE BLOOD TEST: CPT

## 2023-12-07 PROCEDURE — C1894 INTRO/SHEATH, NON-LASER: HCPCS | Performed by: INTERNAL MEDICINE

## 2023-12-07 PROCEDURE — 2500000003 HC RX 250 WO HCPCS: Performed by: INTERNAL MEDICINE

## 2023-12-07 PROCEDURE — 6360000004 HC RX CONTRAST MEDICATION: Performed by: INTERNAL MEDICINE

## 2023-12-07 PROCEDURE — 6360000002 HC RX W HCPCS: Performed by: INTERNAL MEDICINE

## 2023-12-07 PROCEDURE — C1769 GUIDE WIRE: HCPCS | Performed by: INTERNAL MEDICINE

## 2023-12-07 RX ORDER — MIDAZOLAM HYDROCHLORIDE 1 MG/ML
INJECTION INTRAMUSCULAR; INTRAVENOUS PRN
Status: DISCONTINUED | OUTPATIENT
Start: 2023-12-07 | End: 2023-12-07 | Stop reason: HOSPADM

## 2023-12-07 RX ORDER — LIDOCAINE HYDROCHLORIDE 10 MG/ML
INJECTION, SOLUTION INFILTRATION; PERINEURAL PRN
Status: DISCONTINUED | OUTPATIENT
Start: 2023-12-07 | End: 2023-12-07 | Stop reason: HOSPADM

## 2023-12-07 RX ORDER — SODIUM CHLORIDE 9 MG/ML
INJECTION, SOLUTION INTRAVENOUS CONTINUOUS
Status: DISCONTINUED | OUTPATIENT
Start: 2023-12-07 | End: 2023-12-07 | Stop reason: HOSPADM

## 2023-12-07 RX ORDER — TIZANIDINE 4 MG/1
4 TABLET ORAL EVERY 6 HOURS PRN
COMMUNITY

## 2023-12-07 RX ORDER — HEPARIN SODIUM 200 [USP'U]/100ML
INJECTION, SOLUTION INTRAVENOUS CONTINUOUS PRN
Status: DISCONTINUED | OUTPATIENT
Start: 2023-12-07 | End: 2023-12-07 | Stop reason: HOSPADM

## 2023-12-07 RX ORDER — ASPIRIN 81 MG/1
324 TABLET, CHEWABLE ORAL DAILY
Status: DISCONTINUED | OUTPATIENT
Start: 2023-12-07 | End: 2023-12-07 | Stop reason: HOSPADM

## 2023-12-07 RX ADMIN — SODIUM CHLORIDE: 9 INJECTION, SOLUTION INTRAVENOUS at 09:46

## 2023-12-07 ASSESSMENT — PAIN SCALES - GENERAL
PAINLEVEL_OUTOF10: 0
PAINLEVEL_OUTOF10: 0

## 2023-12-07 NOTE — PROGRESS NOTES
[facility]/C w/ Dr. Rhonda Hudson - PA pressures WNL  RAC access, 7fr sheath removed, covered w/ gauze/tegaderm/coban    [facility] - No interventions  R radial access, TR band to R radial @ 23mL    No s/sxs of bleeding or hematoma to R brachial/radial access sites    Heparin 5000 units IC  Versed 2mg IV    TRANSFER - OUT REPORT:    Verbal report given to [facility], RN on Tina Garibay  being transferred to [facility] for routine progression of patient care       Report consisted of patient's Situation, Background, Assessment and   Recommendations(SBAR). Information from the following report(s) Nurse Handoff Report and MAR was reviewed with the receiving nurse. Opportunity for questions and clarification was provided.       Patient transported with:  [employer]

## 2023-12-07 NOTE — PROGRESS NOTES
TRANSFER - IN REPORT:    Verbal report received from [facility] RN on Ivet Trejo  being received from cath lab for routine progression of patient care      Report consisted of patient's Situation, Background, Assessment and   Recommendations(SBAR). Information from the following report(s) Nurse Handoff Report was reviewed with the receiving nurse. Opportunity for questions and clarification was provided. Assessment completed upon patient's arrival to unit and care assumed.

## 2023-12-07 NOTE — DISCHARGE INSTRUCTIONS
HEART CATHETERIZATION/ANGIOGRAPHY DISCHARGE INSTRUCTIONS    Check puncture site frequently for swelling or bleeding. If there is any bleeding, apply pressure over the area with a clean towel or washcloth and call 911. Notify your doctor for any redness, swelling, drainage, or oozing from the puncture site. Notify your doctor for any fever or chills. If the extremity becomes cold, numb, or painful call [facility] at [phone]. Activity should be limited for the next 48 hours. No heavy lifting, pushing, pulling  or strenuous activity for 48 hours. No heavy lifting (anything over 10 pounds) for 3 days. You may resume your usual diet. Drink more fluids than usual.  Have a responsible person drive you home and stay with you for at least 24 hours after your heart catheterization/angiography. You may remove bandage from your right wrist and right arm in 24 hours. You may shower in 24 hours. No tub baths, hot tubs, or swimming for 1 week. Do not place any lotions, creams, powders, or ointments over puncture site for 1 week. You may place a clean band-aid over the puncture site each day for 5 days. Change daily. Sedation for a Medical Procedure: Care Instructions     You were given a sedative medication during your visit. While many of the effects will have worn   off before you leave; you may continue to feel some effects for several hours. Common side effects from sedation include:  Feeling sleepy. (Your doctors and nurses will make sure you are not too sleepy to go home.)  Nausea and vomiting. This usually does not last long. Feeling tired. How can you care for yourself at home? Activity    Don't do anything for 24 hours that requires attention to detail. It takes time for the medicine effects to completely wear off. Do not make important legal decisions for 24 hours. Do not sign any legal documents for 24 hours.      Do not drink alcohol today     For your safety, you should not drive

## 2024-01-19 ENCOUNTER — OFFICE VISIT (OUTPATIENT)
Age: 64
End: 2024-01-19

## 2024-01-19 VITALS
WEIGHT: 195.2 LBS | HEIGHT: 68 IN | HEART RATE: 120 BPM | SYSTOLIC BLOOD PRESSURE: 86 MMHG | DIASTOLIC BLOOD PRESSURE: 60 MMHG | BODY MASS INDEX: 29.58 KG/M2

## 2024-01-19 DIAGNOSIS — I10 PRIMARY HYPERTENSION: Primary | ICD-10-CM

## 2024-01-19 DIAGNOSIS — R00.0 TACHYCARDIA: ICD-10-CM

## 2024-01-19 DIAGNOSIS — R06.09 DOE (DYSPNEA ON EXERTION): ICD-10-CM

## 2024-01-19 ASSESSMENT — ENCOUNTER SYMPTOMS
APHONIA: 0
EYE PAIN: 0
NAIL CHANGES: 0
COUGH: 0
STRIDOR: 0
ABDOMINAL PAIN: 0

## 2024-01-19 NOTE — PROGRESS NOTES
Hypercholesterolemia     crestor at night     Hypertension     under control with meds     Lymphadenopathy     Bilat [id number]    Nausea & vomiting     one time after surgery at [facility] - was in great deal of pain - after bladder stimulator placement     Neurogenic bladder     Psychotic disorder (HCC)     bipolar; meds taken under control     PUD (peptic ulcer disease)     hiatal hernia; takes omeprazole as needed OTC     Sleep apnea     wears cpap nightly      Past Surgical History:   Procedure Laterality Date    CARDIAC PROCEDURE N/A 12/7/2023    Left and right heart cath / coronary angiography performed by Jose A Magaña MD at [facility] CARDIAC CATH LAB    CERVICAL FUSION  04/12/2022    ACDF C5-6 DR. Haines    COLONOSCOPY      CYST REMOVAL      under left arm - no lymph nodes removed     GYN      [facility]    HEENT      tonsils parotid    ORTHOPEDIC SURGERY Bilateral     bilat CTR     ROTATOR CUFF REPAIR Left     RCR     SHOULDER ARTHROSCOPY Right     shoulder scope to clean up the joint    UPPER GASTROINTESTINAL ENDOSCOPY      UROLOGICAL SURGERY      bladder stimulator-[facility] x5     Family History   Problem Relation Age of Onset    Breast Cancer Neg Hx     Parkinson's Disease Father     Heart Disease Father     Diabetes Father     Hypertension Father      Social History     Tobacco Use    Smoking status: Former    Smokeless tobacco: Never    Tobacco comments:     Quit smoking: college days only smoked 9 months   Substance Use Topics    Alcohol use: Yes     Alcohol/week: 1.0 standard drink of alcohol       ROS:    Review of Systems   Constitutional: Negative for fever.   HENT:  Negative for stridor.    Eyes:  Negative for pain.   Cardiovascular:  Negative for chest pain.   Respiratory:  Negative for cough.    Endocrine: Negative for cold intolerance.   Skin:  Negative for nail changes.   Musculoskeletal:  Negative for arthritis.   Gastrointestinal:  Negative for abdominal pain.   Genitourinary:  Negative for dysuria.

## 2024-01-24 ENCOUNTER — TRANSCRIBE ORDERS (OUTPATIENT)
Dept: SCHEDULING | Age: 64
End: 2024-01-24

## 2024-01-24 DIAGNOSIS — Z12.31 ENCOUNTER FOR SCREENING MAMMOGRAM FOR MALIGNANT NEOPLASM OF BREAST: Primary | ICD-10-CM

## 2024-01-30 ENCOUNTER — PATIENT MESSAGE (OUTPATIENT)
Age: 64
End: 2024-01-30

## 2024-02-05 ENCOUNTER — HOSPITAL ENCOUNTER (OUTPATIENT)
Dept: PHYSICAL THERAPY | Age: 64
Setting detail: RECURRING SERIES
Discharge: HOME | End: 2024-02-08
Payer: MEDICARE

## 2024-02-05 DIAGNOSIS — M62.81 MUSCLE WEAKNESS (GENERALIZED): ICD-10-CM

## 2024-02-05 DIAGNOSIS — M25.562 ACUTE PAIN OF LEFT KNEE: Primary | ICD-10-CM

## 2024-02-05 DIAGNOSIS — R26.2 DIFFICULTY IN WALKING: ICD-10-CM

## 2024-02-05 DIAGNOSIS — M25.662 STIFFNESS OF LEFT KNEE, NOT ELSEWHERE CLASSIFIED: ICD-10-CM

## 2024-02-05 PROCEDURE — 97161 PT EVAL LOW COMPLEX 20 MIN: CPT

## 2024-02-05 PROCEDURE — 97140 MANUAL THERAPY 1/> REGIONS: CPT

## 2024-02-05 PROCEDURE — 97110 THERAPEUTIC EXERCISES: CPT

## 2024-02-05 ASSESSMENT — PAIN SCALES - GENERAL: PAINLEVEL_OUTOF10: 7

## 2024-02-05 NOTE — THERAPY EVALUATION
with 0 representing \"extreme difficulty or unable to perform\" and 4 representing \"no difficulty\".  The lower the score, the greater the functional disability. 80/80 represents no disability.  Minimal detectable change is 9 points.    Medical Necessity:   > Patient is expected to demonstrate progress in strength, range of motion, balance, coordination, and functional technique to assist with return to all daily activities both at home and in the community and with traveling.  > Skilled intervention continues to be required due to increased pain, swelling, decreased mobility, flexbility, stability, ROM, strength, balance, and function.  Reason For Services/Other Comments:  > Patient continues to require skilled intervention due to limited functional abilities, increased risk of falling, and increasing complexity of exercise.      Regarding Karla Hart's therapy, I certify that the treatment plan above will be carried out by a therapist or under their direction.  Thank you for this referral,  Dru Zambrano PT     Referring Physician Signature: Bill Penny, * _______________________________ Date _____________        Charge Capture  Appt Desk

## 2024-02-05 NOTE — PROGRESS NOTES
Karla Hart  : 1960  Primary: Medicare Part A And B (Medicare)  Secondary: GELACIO LEUNG [facility] [place]  [address]  [facility] [address]  Phone: [phone]  Fax: [phone] Plan Frequency: 2 times per week for 90 days  Plan of Care/Certification Expiration Date: 24 (Plan of Care Start Date 2024)        Plan of Care/Certification Expiration Date:  Plan of Care/Certification Expiration Date: 24 (Plan of Care Start Date 2024)    Frequency/Duration: Plan Frequency: 2 times per week for 90 days      Time In/Out:   Time In: 1231  Time Out: 1330      PT Visit Info:         Visit Count:  1    OUTPATIENT PHYSICAL THERAPY:   Treatment Note 2024       Episode  (Left Knee Pain, Stiffness, Weakness)               Treatment Diagnosis:    Acute pain of left knee  Stiffness of left knee, not elsewhere classified  Muscle weakness (generalized)  Difficulty in walking  Medical/Referring Diagnosis:    Unilateral primary osteoarthritis, right knee [M17.11]    Referring Physician:  Bill Penny MD MD Orders:  PT Eval and Treat   Return MD Appt:  2024   Date of Onset:  Onset Date: 24     Allergies:   Crab extract allergy skin test, Cephalexin, Morphine, Morphine, Oxycodone-acetaminophen, Zolpidem, Adhesive tape, and Cephalexin  Restrictions/Precautions:   Weight Bearing Status: WBAT  Cardiac Precautions: hypertension and Bradycardia, Tachcardia, SOB, Asthma, Dyspnea  Fall Precautions: Fall Risk (Two recent falls)      Interventions Planned (Treatment may consist of any combination of the following):     See Assessment Note    Subjective Comments:   Patient reports that she gets a lot of pressure bending her knee, across the front.  Initial Pain Level::     7/10  Post Session Pain Level:       4/10  Medications Last Reviewed:  2024  Updated Objective Findings:  See Evaluation Note from today  Treatment

## 2024-02-06 ENCOUNTER — OFFICE VISIT (OUTPATIENT)
Age: 64
End: 2024-02-06
Payer: MEDICARE

## 2024-02-06 VITALS
HEIGHT: 68 IN | SYSTOLIC BLOOD PRESSURE: 110 MMHG | DIASTOLIC BLOOD PRESSURE: 70 MMHG | BODY MASS INDEX: 30.03 KG/M2 | WEIGHT: 198.13 LBS | HEART RATE: 64 BPM

## 2024-02-06 DIAGNOSIS — I48.0 PAROXYSMAL ATRIAL FIBRILLATION (HCC): Primary | ICD-10-CM

## 2024-02-06 DIAGNOSIS — I10 PRIMARY HYPERTENSION: ICD-10-CM

## 2024-02-06 DIAGNOSIS — R00.0 TACHYCARDIA: ICD-10-CM

## 2024-02-06 PROCEDURE — G8428 CUR MEDS NOT DOCUMENT: HCPCS | Performed by: INTERNAL MEDICINE

## 2024-02-06 PROCEDURE — 3017F COLORECTAL CA SCREEN DOC REV: CPT | Performed by: INTERNAL MEDICINE

## 2024-02-06 PROCEDURE — G8484 FLU IMMUNIZE NO ADMIN: HCPCS | Performed by: INTERNAL MEDICINE

## 2024-02-06 PROCEDURE — 3074F SYST BP LT 130 MM HG: CPT | Performed by: INTERNAL MEDICINE

## 2024-02-06 PROCEDURE — 99214 OFFICE O/P EST MOD 30 MIN: CPT | Performed by: INTERNAL MEDICINE

## 2024-02-06 PROCEDURE — 1036F TOBACCO NON-USER: CPT | Performed by: INTERNAL MEDICINE

## 2024-02-06 PROCEDURE — 3078F DIAST BP <80 MM HG: CPT | Performed by: INTERNAL MEDICINE

## 2024-02-06 PROCEDURE — G8417 CALC BMI ABV UP PARAM F/U: HCPCS | Performed by: INTERNAL MEDICINE

## 2024-02-06 RX ORDER — LISINOPRIL 10 MG/1
5 TABLET ORAL DAILY
Qty: 30 TABLET | Refills: 0
Start: 2024-02-06 | End: 2024-03-07

## 2024-02-06 RX ORDER — SITAGLIPTIN AND METFORMIN HYDROCHLORIDE 1000; 100 MG/1; MG/1
TABLET, FILM COATED, EXTENDED RELEASE ORAL
COMMUNITY
Start: 2024-01-17

## 2024-02-06 ASSESSMENT — ENCOUNTER SYMPTOMS
APHONIA: 0
EYE PAIN: 0
COUGH: 0
ABDOMINAL PAIN: 0
NAIL CHANGES: 0
STRIDOR: 0

## 2024-02-06 NOTE — PROGRESS NOTES
[address], [address]  [place] [address]  PHONE: [phone]    SUBJECTIVE:   Karla Hart is a 63 y.o. female 1960      History of present illness: 63 y.o. female previous history of COVID-19 infection comprehensive cardiac workup with normal echocardiogram right and left heart catheterization performed.  Patient with profoundly elevated heart rates consistent with IST following COVID-19 infection.  Patient placed on Corlanor with improvement of symptoms.  She continues to have high measured blood pressure readings at home dizziness and fatigue at times.  All of her blood pressure measurements in our office have been very low most with systolic pressures less than 100 mmHg.    Interval history:   Patient previously participating in diving and physical activities.  She began having dyspnea on exertion in summer 2023.  This followed knee surgery.  She presented for follow-up to evaluate nuclear stress perfusion study results today normal perfusion but patient had very short exercise time.  Ambulated down hallway today with heart rates in excess of 120 bpm with desaturation to 93% oxygen.      history of atrial fibrillation documented at the [facility] patient lives in [place] part-time  is a  at a [employer] in [place].  She described episodes of dyspnea on exertion when in [facility] as well as chest discomfort.  She has a previous history of atrial fibrillation was diagnosed by a physician in [place] based on her nighttime heart monitoring done for SARI.  Father with CAD  [facility] rings dicfficut getting off saw [facility]     Cardiac History:  10/31/2023 EKG sinus rhythm low voltage  11/17/2023 nuclear stress perfusion study low risk normal perfusion  12/7/2023 cardiac catheterization angiographically normal coronary arteries LVEDP 10 mmHg  1/19/2023 EKG sinus tachycardia     Assessment:  Hypertension  Taken off chlorthalidone therapy suspect

## 2024-02-08 ENCOUNTER — NURSE ONLY (OUTPATIENT)
Age: 64
End: 2024-02-08

## 2024-02-08 ENCOUNTER — TELEPHONE (OUTPATIENT)
Age: 64
End: 2024-02-08

## 2024-02-08 VITALS — SYSTOLIC BLOOD PRESSURE: 124 MMHG | HEART RATE: 60 BPM | DIASTOLIC BLOOD PRESSURE: 86 MMHG

## 2024-02-08 NOTE — PROGRESS NOTES
Pt 's came in for BP check with personal machine's.     Personal Wrist machine   152/96   59    Personal Arm machine  125/89   64    UCARD  124/86   60

## 2024-02-13 ENCOUNTER — HOSPITAL ENCOUNTER (OUTPATIENT)
Dept: PHYSICAL THERAPY | Age: 64
Setting detail: RECURRING SERIES
End: 2024-02-13
Payer: MEDICARE

## 2024-02-13 NOTE — PROGRESS NOTES
Physical Therapy  [facility] 2/13/2024    Patient is having pre-op for surgery.      Dru Zambrano PT, DPT, TPS

## 2024-02-24 ENCOUNTER — HOSPITAL ENCOUNTER (OUTPATIENT)
Dept: MAMMOGRAPHY | Age: 64
End: 2024-02-24
Payer: MEDICARE

## 2024-02-24 VITALS — HEIGHT: 66 IN | WEIGHT: 194 LBS | BODY MASS INDEX: 31.18 KG/M2

## 2024-02-24 DIAGNOSIS — Z12.31 ENCOUNTER FOR SCREENING MAMMOGRAM FOR MALIGNANT NEOPLASM OF BREAST: ICD-10-CM

## 2024-02-24 PROCEDURE — 77063 BREAST TOMOSYNTHESIS BI: CPT

## 2024-04-17 ENCOUNTER — HOSPITAL ENCOUNTER (OUTPATIENT)
Dept: GENERAL RADIOLOGY | Age: 64
Discharge: HOME | End: 2024-04-20
Payer: MEDICARE

## 2024-04-17 ENCOUNTER — OFFICE VISIT (OUTPATIENT)
Dept: PULMONOLOGY | Age: 64
End: 2024-04-17
Payer: MEDICARE

## 2024-04-17 VITALS
SYSTOLIC BLOOD PRESSURE: 138 MMHG | DIASTOLIC BLOOD PRESSURE: 92 MMHG | RESPIRATION RATE: 18 BRPM | WEIGHT: 193 LBS | BODY MASS INDEX: 31.02 KG/M2 | OXYGEN SATURATION: 96 % | HEIGHT: 66 IN | HEART RATE: 62 BPM

## 2024-04-17 DIAGNOSIS — R06.00 DYSPNEA, UNSPECIFIED TYPE: ICD-10-CM

## 2024-04-17 DIAGNOSIS — J45.909 UNCOMPLICATED ASTHMA, UNSPECIFIED ASTHMA SEVERITY, UNSPECIFIED WHETHER PERSISTENT: ICD-10-CM

## 2024-04-17 DIAGNOSIS — J45.909 UNCOMPLICATED ASTHMA, UNSPECIFIED ASTHMA SEVERITY, UNSPECIFIED WHETHER PERSISTENT: Primary | ICD-10-CM

## 2024-04-17 LAB
EXPIRATORY TIME: NORMAL
FEF 25-75% %PRED-PRE: NORMAL
FEF 25-75% PRED: NORMAL
FEF 25-75-PRE: NORMAL
FEV1 %PRED-PRE: 87 %
FEV1 PRED: NORMAL
FEV1/FVC %PRED-PRE: NORMAL
FEV1/FVC PRED: NORMAL
FEV1/FVC: 76 %
FEV1: 2.28 L
FVC %PRED-PRE: 90 %
FVC PRED: NORMAL
FVC: 3.02 L
PEF %PRED-PRE: NORMAL
PEF PRED: NORMAL
PEF-PRE: NORMAL

## 2024-04-17 PROCEDURE — 3017F COLORECTAL CA SCREEN DOC REV: CPT | Performed by: INTERNAL MEDICINE

## 2024-04-17 PROCEDURE — G8427 DOCREV CUR MEDS BY ELIG CLIN: HCPCS | Performed by: INTERNAL MEDICINE

## 2024-04-17 PROCEDURE — 3080F DIAST BP >= 90 MM HG: CPT | Performed by: INTERNAL MEDICINE

## 2024-04-17 PROCEDURE — 71046 X-RAY EXAM CHEST 2 VIEWS: CPT

## 2024-04-17 PROCEDURE — 99204 OFFICE O/P NEW MOD 45 MIN: CPT | Performed by: INTERNAL MEDICINE

## 2024-04-17 PROCEDURE — G8417 CALC BMI ABV UP PARAM F/U: HCPCS | Performed by: INTERNAL MEDICINE

## 2024-04-17 PROCEDURE — 3075F SYST BP GE 130 - 139MM HG: CPT | Performed by: INTERNAL MEDICINE

## 2024-04-17 PROCEDURE — 1036F TOBACCO NON-USER: CPT | Performed by: INTERNAL MEDICINE

## 2024-04-17 PROCEDURE — 94010 BREATHING CAPACITY TEST: CPT | Performed by: INTERNAL MEDICINE

## 2024-04-17 RX ORDER — FLUTICASONE FUROATE, UMECLIDINIUM BROMIDE AND VILANTEROL TRIFENATATE 100; 62.5; 25 UG/1; UG/1; UG/1
1 POWDER RESPIRATORY (INHALATION) DAILY
Qty: 1 EACH | Refills: 11 | Status: SHIPPED | OUTPATIENT
Start: 2024-04-17

## 2024-04-17 ASSESSMENT — PULMONARY FUNCTION TESTS
FVC_PERCENT_PREDICTED_PRE: 90
FEV1: 2.28
FEV1/FVC: 76
FEV1_PERCENT_PREDICTED_PRE: 87
FVC: 3.02

## 2024-04-17 NOTE — PROGRESS NOTES
metoprolol     Arthritis     osteo     Asthma     under control with meds and inhalers and nebs no SOB - mainly exercise induced and allergy induced but uses daily inhaler     Chronic pain     from neck and shoulder     COVID     hx of covid - 9/2021 in [place] - was not hospitalized at that time     Depression     trazodone     Diabetes (HCC)     oral meds; A1C 1/28/2022 = 6.2; avg fasting blood sugar=    GERD (gastroesophageal reflux disease)     omeprazole taken as needed     Headache     migraines takes maxalt as needed     Hypercholesterolemia     crestor at night     Hypertension     under control with meds     Lymphadenopathy     Bilat [phone]    Nausea & vomiting     one time after surgery at [facility] - was in great deal of pain - after bladder stimulator placement     Neurogenic bladder     Psychotic disorder (HCC)     bipolar; meds taken under control     PUD (peptic ulcer disease)     hiatal hernia; takes omeprazole as needed OTC     Sleep apnea     wears cpap nightly        Patient Active Problem List   Diagnosis    Cervical spinal stenosis    DDD (degenerative disc disease), cervical    Cervical radiculopathy    Neuroforaminal stenosis of cervical spine    Allergic rhinitis    Atrial fibrillation (HCC)    Bipolar I disorder, single manic episode ([facility])    Cervicalgia    Chronic low back pain    Closed fracture of left distal radius    Constipation    COVID-19 virus infection    Decreased libido    Disorder of shoulder    Dysfunction of left rotator cuff    Shoulder pain, left    Asthma    Exercise-induced asthma    Obstructive sleep apnea syndrome    Fibrocystic breast disease    Full incontinence of feces    Hypercholesterolemia    Hyperlipidemia    Hypertension    Impingement syndrome of right shoulder    Injury of shoulder and upper arm    Neurogenic bladder    Nonintractable migraine    Osteoarthrosis    Pain in joint    Patellofemoral pain syndrome of left knee    Adhesive capsulitis of right

## 2024-04-30 ENCOUNTER — HOSPITAL ENCOUNTER (OUTPATIENT)
Dept: PHYSICAL THERAPY | Age: 64
Setting detail: RECURRING SERIES
Discharge: HOME | End: 2024-05-03
Payer: MEDICARE

## 2024-04-30 DIAGNOSIS — M62.81 MUSCLE WEAKNESS (GENERALIZED): ICD-10-CM

## 2024-04-30 DIAGNOSIS — M54.6 PAIN IN THORACIC SPINE: Primary | ICD-10-CM

## 2024-04-30 DIAGNOSIS — R29.3 ABNORMAL POSTURE: ICD-10-CM

## 2024-04-30 PROCEDURE — 97162 PT EVAL MOD COMPLEX 30 MIN: CPT

## 2024-04-30 PROCEDURE — 97110 THERAPEUTIC EXERCISES: CPT

## 2024-04-30 PROCEDURE — 97140 MANUAL THERAPY 1/> REGIONS: CPT

## 2024-04-30 ASSESSMENT — PAIN SCALES - GENERAL: PAINLEVEL_OUTOF10: 8

## 2024-04-30 NOTE — PROGRESS NOTES
Treatment Billable Duration:  68 minutes (30 minutes evaluation, 23 minutes manual therapy, 15 minute therapeutic exercise)  Time In: 1430  Time Out: 1544    rDu Zambrano PT         Charge Capture  [employer] Portal  Appt Desk     Future Appointments   Date Time Provider Department Center   5/8/2024  8:00 AM Dru Zambrano, PT [facility]   5/13/2024  1:30 PM Dru Zambrano, PT [facility]   5/17/2024  1:30 PM Dru Zambrano, PT [facility]   5/20/2024  2:30 PM Dru Zambrano, PT [facility]   5/24/2024  2:30 PM Ron David, PT [facility]   5/29/2024  2:30 PM Dru Zambrano, PT [facility]   5/31/2024  2:30 PM Dru Zambrano, PT [facility]   7/18/2024 10:20 AM Alis Fuentes APRN - CNP [facility]   8/7/2024  8:00 AM Enrico Lewis MD [facility]
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
## 2024-05-01 ENCOUNTER — APPOINTMENT (OUTPATIENT)
Dept: PHYSICAL THERAPY | Age: 64
End: 2024-05-01
Payer: MEDICARE

## 2024-05-08 ENCOUNTER — HOSPITAL ENCOUNTER (OUTPATIENT)
Dept: PHYSICAL THERAPY | Age: 64
Setting detail: RECURRING SERIES
Discharge: HOME | End: 2024-05-11
Payer: MEDICARE

## 2024-05-08 PROCEDURE — 97110 THERAPEUTIC EXERCISES: CPT

## 2024-05-08 PROCEDURE — 97140 MANUAL THERAPY 1/> REGIONS: CPT

## 2024-05-08 ASSESSMENT — PAIN SCALES - GENERAL: PAINLEVEL_OUTOF10: 8

## 2024-05-08 NOTE — PROGRESS NOTES
Karla Hart  : 1960  Primary: Medicare Part A And B (Medicare)  Secondary: GELACIO LEUNG [facility] [place]  [address]  [facility] [address]  Phone: [phone]  Fax: [phone] Plan Frequency: 2 times per week for 60 days    Plan of Care/Certification Expiration Date: 24 (Plan of Care Start Date 2024)        Plan of Care/Certification Expiration Date:  Plan of Care/Certification Expiration Date: 24 (Plan of Care Start Date 2024)    Frequency/Duration: Plan Frequency: 2 times per week for 60 days      Time In/Out:   Time In: 0755  Time Out: 0855      PT Visit Info:         Visit Count:  2    OUTPATIENT PHYSICAL THERAPY:   Treatment Note 2024       Episode  (Pain in Thoracic)               Treatment Diagnosis:    Pain in thoracic spine  Muscle weakness (generalized)  Abnormal posture  Medical/Referring Diagnosis:    Pain in thoracic spine    Referring Physician:  Dolly Matt PA MD Orders:  PT Eval and Treat   Return MD Appt:  2024   Date of Onset:  Onset Date: 24 (Fell, 2nd Fall 3/5/2024, 3rd Fall 3/30/2024)     Allergies:   Crab extract, Cephalexin, Morphine, Morphine, Oxycodone-acetaminophen, Zolpidem, Adhesive tape, and Cephalexin  Restrictions/Precautions:   Fall Precautions: Fall Risk  History of Anxiety, Ashtma, Afib, Diabetes, High Blood Pressure, Difficulty Sleeping, Frequent Falls, Urinary Incontience, Left Wrist Fracture , Numbness, Right Rotator Cuff Repair, Right Knee Replacement , ACDF , Lumbar Neurostimulator   Interventions Planned (Treatment may consist of any combination of the following):     See Assessment Note    Subjective Comments:   Patient reports that she is hurting in her mid back after 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   Initial Pain Level::     810  Post Session Pain Level:       4/10  Medications Last Reviewed:  2024  Updated Objective

## 2024-05-13 ENCOUNTER — HOSPITAL ENCOUNTER (OUTPATIENT)
Dept: PHYSICAL THERAPY | Age: 64
Setting detail: RECURRING SERIES
Discharge: HOME | End: 2024-05-16
Payer: MEDICARE

## 2024-05-13 PROCEDURE — 97140 MANUAL THERAPY 1/> REGIONS: CPT

## 2024-05-13 PROCEDURE — 97110 THERAPEUTIC EXERCISES: CPT

## 2024-05-13 ASSESSMENT — PAIN SCALES - GENERAL: PAINLEVEL_OUTOF10: 5

## 2024-05-13 NOTE — PROGRESS NOTES
Karla Hart  : 1960  Primary: Medicare Part A And B (Medicare)  Secondary: GELACIO LEUNG [facility] [place]  [address]  [facility] [address]  Phone: [phone]  Fax: [phone] Plan Frequency: 2 times per week for 60 days    Plan of Care/Certification Expiration Date: 24 (Plan of Care Start Date 2024)        Plan of Care/Certification Expiration Date:  Plan of Care/Certification Expiration Date: 24 (Plan of Care Start Date 2024)    Frequency/Duration: Plan Frequency: 2 times per week for 60 days      Time In/Out:   Time In: 1330  Time Out: 1430      PT Visit Info:         Visit Count:  3    OUTPATIENT PHYSICAL THERAPY:   Treatment Note 2024       Episode  (Pain in Thoracic)               Treatment Diagnosis:    Pain in thoracic spine  Muscle weakness (generalized)  Abnormal posture  Medical/Referring Diagnosis:    Pain in thoracic spine    Referring Physician:  Dolly Matt PA MD Orders:  PT Eval and Treat   Return MD Appt:  2024   Date of Onset:  Onset Date: 24 (Fell, 2nd Fall 3/5/2024, 3rd Fall 3/30/2024)     Allergies:   Crab extract, Cephalexin, Morphine, Morphine, Oxycodone-acetaminophen, Zolpidem, Adhesive tape, and Cephalexin  Restrictions/Precautions:   Fall Precautions: Fall Risk  History of Anxiety, Ashtma, Afib, Diabetes, High Blood Pressure, Difficulty Sleeping, Frequent Falls, Urinary Incontience, Left Wrist Fracture , Numbness, Right Rotator Cuff Repair, Right Knee Replacement , ACDF , Lumbar Neurostimulator   Interventions Planned (Treatment may consist of any combination of the following):     See Assessment Note    Subjective Comments:   Patient reports that the left side of her mid back is still very stiff and tight.    Initial Pain Level::     5/10  Post Session Pain Level:       3/10  Medications Last Reviewed:  2024  Updated Objective Findings:   Mobility:

## 2024-05-17 ENCOUNTER — HOSPITAL ENCOUNTER (OUTPATIENT)
Dept: PHYSICAL THERAPY | Age: 64
Setting detail: RECURRING SERIES
Discharge: HOME | End: 2024-05-20
Payer: MEDICARE

## 2024-05-17 PROCEDURE — 97110 THERAPEUTIC EXERCISES: CPT

## 2024-05-17 PROCEDURE — 97140 MANUAL THERAPY 1/> REGIONS: CPT

## 2024-05-17 ASSESSMENT — PAIN SCALES - GENERAL: PAINLEVEL_OUTOF10: 5

## 2024-05-17 NOTE — PROGRESS NOTES
Karla Hart  : 1960  Primary: Medicare Part A And B (Medicare)  Secondary: ANTHALLIE XOCHITL [facility] [place]  [address]  [facility] [address]  Phone: [phone]  Fax: [phone] Plan Frequency: 2 times per week for 60 days    Plan of Care/Certification Expiration Date: 24 (Plan of Care Start Date 2024)        Plan of Care/Certification Expiration Date:  Plan of Care/Certification Expiration Date: 24 (Plan of Care Start Date 2024)    Frequency/Duration: Plan Frequency: 2 times per week for 60 days      Time In/Out:   Time In: 1330  Time Out: 1426      PT Visit Info:         Visit Count:  4    OUTPATIENT PHYSICAL THERAPY:   Treatment Note 2024       Episode  (Pain in Thoracic)               Treatment Diagnosis:    Pain in thoracic spine  Muscle weakness (generalized)  Abnormal posture  Medical/Referring Diagnosis:    Pain in thoracic spine    Referring Physician:  Dolly Matt PA MD Orders:  PT Eval and Treat   Return MD Appt:  2024   Date of Onset:  Onset Date: 24 (Fell, 2nd Fall 3/5/2024, 3rd Fall 3/30/2024)     Allergies:   Crab extract, Cephalexin, Morphine, Morphine, Oxycodone-acetaminophen, Zolpidem, Adhesive tape, and Cephalexin  Restrictions/Precautions:   Fall Precautions: Fall Risk  History of Anxiety, Ashtma, Afib, Diabetes, High Blood Pressure, Difficulty Sleeping, Frequent Falls, Urinary Incontience, Left Wrist Fracture , Numbness, Right Rotator Cuff Repair, Right Knee Replacement , ACDF , Lumbar Neurostimulator   Interventions Planned (Treatment may consist of any combination of the following):     See Assessment Note    Subjective Comments:   Patient reports that she is having right sided pain today. She reports that she had her MRI and sees MD Monday about results.    Initial Pain Level::     5/10  Post Session Pain Level:       310  Medications Last Reviewed:

## 2024-05-20 ENCOUNTER — HOSPITAL ENCOUNTER (OUTPATIENT)
Dept: PHYSICAL THERAPY | Age: 64
Setting detail: RECURRING SERIES
Discharge: HOME | End: 2024-05-23
Payer: MEDICARE

## 2024-05-20 PROCEDURE — 97110 THERAPEUTIC EXERCISES: CPT

## 2024-05-20 PROCEDURE — 97140 MANUAL THERAPY 1/> REGIONS: CPT

## 2024-05-20 ASSESSMENT — PAIN SCALES - GENERAL: PAINLEVEL_OUTOF10: 5

## 2024-05-20 NOTE — PROGRESS NOTES
Karla Hart  : 1960  Primary: Medicare Part A And B (Medicare)  [facility] [place]  [address]  [facility] [address]  Phone: [phone]  Fax: [phone] Plan Frequency: 2 times per week for 60 days    Plan of Care/Certification Expiration Date: 24 (Plan of Care Start Date 2024)        Plan of Care/Certification Expiration Date:  Plan of Care/Certification Expiration Date: 24 (Plan of Care Start Date 2024)    Frequency/Duration: Plan Frequency: 2 times per week for 60 days      Time In/Out:   Time In: 1425  Time Out: 1525      PT Visit Info:         Visit Count:  5    OUTPATIENT PHYSICAL THERAPY:   Treatment Note 2024       Episode  (Pain in Thoracic)               Treatment Diagnosis:    Pain in thoracic spine  Muscle weakness (generalized)  Abnormal posture  Medical/Referring Diagnosis:    Pain in thoracic spine    Referring Physician:  Dolly Matt PA MD Orders:  PT Eval and Treat   Return MD Appt:  2024   Date of Onset:  Onset Date: 24 (Fell, 2nd Fall 3/5/2024, 3rd Fall 3/30/2024)     Allergies:   Crab extract, Cephalexin, Morphine, Morphine, Oxycodone-acetaminophen, Zolpidem, Adhesive tape, and Cephalexin  Restrictions/Precautions:   Fall Precautions: Fall Risk  History of Anxiety, Ashtma, Afib, Diabetes, High Blood Pressure, Difficulty Sleeping, Frequent Falls, Urinary Incontience, Left Wrist Fracture , Numbness, Right Rotator Cuff Repair, Right Knee Replacement , ACDF , Lumbar Neurostimulator   Interventions Planned (Treatment may consist of any combination of the following):     See Assessment Note    Subjective Comments:   Patient reports that she is going to have lumbar injections in a few weeks. She reports doing ok, but has been doing a lot of stuff at home and in the community.  Initial Pain Level::     5/10  Post Session Pain Level:

## 2024-05-21 NOTE — TELEPHONE ENCOUNTER
LMOM informing pt of Dr. Lewis's response  
Please advise patient to continue to monitor and update me. Please advise not to use wrist machine. Arm machine appears to be accurate.   
Pt 's came in for BP check with personal machine's.     Personal Wrist machine   152/96   59    Personal Arm machine  125/89   64    UCARD  124/86   60      Please advise if there are any further instructions  
What Type Of Note Output Would You Prefer (Optional)?: Standard Output
How Severe Is Your Rash?: mild
Is This A New Presentation, Or A Follow-Up?: Rash
Additional History: Pt was prescribed Ketoconazole shampoo in the past which helped.

## 2024-05-24 ENCOUNTER — HOSPITAL ENCOUNTER (OUTPATIENT)
Dept: PHYSICAL THERAPY | Age: 64
Setting detail: RECURRING SERIES
Discharge: HOME | End: 2024-05-27
Payer: MEDICARE

## 2024-05-24 PROCEDURE — 97110 THERAPEUTIC EXERCISES: CPT

## 2024-05-24 PROCEDURE — 97140 MANUAL THERAPY 1/> REGIONS: CPT

## 2024-05-24 ASSESSMENT — PAIN SCALES - GENERAL: PAINLEVEL_OUTOF10: 6

## 2024-05-24 NOTE — PROGRESS NOTES
Mobility: lower thoracic and lower lumbar mobility limited  .  Treatment   THERAPEUTIC EXERCISE: (24 minutes):    Exercises per grid below to improve mobility, strength, balance, and coordination.  Required moderate visual, verbal, manual, and tactile cues to promote proper body alignment, promote proper body posture, promote proper body mechanics, and promote proper body breathing techniques.  Progressed resistance, range, repetitions, and complexity of movement as indicated.   Date:  5/17/2024 Date:  5/20/2024 Date:  5/24/24   Activity/Exercise Parameters Parameters Parameters   Latissimus Dorsi Stretch   Bar Walk Outs Hands Cross Over 3 x 10 Each Side   Thoracic Mobilization  Seated 3 x 10 Small Yellow Ball Seated 3 x 10 Small Yellow Ball   Cervical AROM      Swiss Ball Seated Swiss Ball Flexion and Extension 3 x 20    Seated Swiss Ball Lateral Reaches 3 x 10 Each Side Seated Swiss Ball Flexion and Extension 3 x 20 Seated Swiss Ball Flexion and Extension 3 x 20   Rows 3 x 10 Red    Scapular Retraction with ER 3 x 10 Red 3 x 10 Red 3 x 10 Red   Shoulder Extension/Low Rows 3 x 10 Red 3 x 10 Red 3 x 10 Red   Trunk Rotation  Supine 3 x 10 3 x 10 Modified Open Book Each Side   Shoulder Abduction  3 x 10 Yellow 3x10 yellow     Time spent with patient reviewing proper muscle recruitment and technique with exercises.     MANUAL THERAPY: (30 minutes):   Joint mobilization and Soft tissue mobilization was utilized and necessary because of the patient's restricted joint motion, painful spasm, loss of articular motion, and restricted motion of soft tissue.   Soft Tissue Mobilization Left and Right Upper and Mid Trapezius, Levator Scapula, Lower Trapezius, Paraspinals Cervical and Thoracic  Joint Mobilization Grade II/III CPA and UPA Thoracic Spine, Prone    MODALITIES: (0 minutes):        None Today      HEP: As above; handouts given to patient for all exercises.     Treatment/Session Summary:    Treatment Assessment:   Pt

## 2024-05-31 ENCOUNTER — HOSPITAL ENCOUNTER (OUTPATIENT)
Dept: PHYSICAL THERAPY | Age: 64
Setting detail: RECURRING SERIES
End: 2024-05-31
Payer: MEDICARE

## 2024-05-31 PROCEDURE — 97110 THERAPEUTIC EXERCISES: CPT

## 2024-05-31 PROCEDURE — 97140 MANUAL THERAPY 1/> REGIONS: CPT

## 2024-05-31 ASSESSMENT — PAIN SCALES - GENERAL: PAINLEVEL_OUTOF10: 6

## 2024-05-31 NOTE — PROGRESS NOTES
Karla Hart  : 1960  Primary: Medicare Part A And B (Medicare)  Secondary: GELACIO [facility] [place]  [address] A  [facility] [address]  Phone: [phone]  Fax: [phone] Plan Frequency: 2 times per week for 60 days    Plan of Care/Certification Expiration Date: 24 (Plan of Care Start Date 2024)        Plan of Care/Certification Expiration Date:  Plan of Care/Certification Expiration Date: 24 (Plan of Care Start Date 2024)    Frequency/Duration: Plan Frequency: 2 times per week for 60 days      Time In/Out:   Time In: 1428  Time Out: 1525      PT Visit Info:         Visit Count:  7    OUTPATIENT PHYSICAL THERAPY:   Treatment Note 2024       Episode  (Pain in Thoracic)               Treatment Diagnosis:    Pain in thoracic spine  Muscle weakness (generalized)  Abnormal posture  Medical/Referring Diagnosis:    Pain in thoracic spine    Referring Physician:  Dolly Matt PA MD Orders:  PT Eval and Treat   Return MD Appt:  2024   Date of Onset:  Onset Date: 24 (Fell, 2nd Fall 3/5/2024, 3rd Fall 3/30/2024)     Allergies:   Crab extract, Cephalexin, Morphine, Morphine, Oxycodone-acetaminophen, Zolpidem, Adhesive tape, and Cephalexin  Restrictions/Precautions:   Fall Precautions: Fall Risk  History of Anxiety, Ashtma, Afib, Diabetes, High Blood Pressure, Difficulty Sleeping, Frequent Falls, Urinary Incontience, Left Wrist Fracture , Numbness, Right Rotator Cuff Repair, Right Knee Replacement , ACDF , Lumbar Neurostimulator   Interventions Planned (Treatment may consist of any combination of the following):     See Assessment Note    Subjective Comments:   Patient reports that she was out of town in [facility] on Tuesday and fell while walking with her  along the road. She reports that she went for X-Rays, that per patient were negative for fracture, but her right lower  all exercises.     Treatment/Session Summary:    Treatment Assessment:   Patient has demonstrated improvements in ROM, but her subjective pain is high currently related to recent fall. Patient would benefit from continued decrease in irritability and progression overall per tolerance.  Communication/Consultation:   HEP reviewed  Equipment provided today:  None  Recommendations/Intent for next treatment session: Next visit will focus on decreasing pain, improving mobility, flexibility, ROM, strength, balance, and function.    >Total Treatment Billable Duration:  53 minutes  Time In: 1428  Time Out: 1525    Dru Zambrano PT         Charge Capture  [employer] Portal  Appt Desk     Future Appointments   Date Time Provider Department Center   6/3/2024  2:30 PM Dru Zambrano, PT [facility]   7/1/2024  9:00 AM Neil Haines MD [facility]   7/18/2024 10:20 AM Alis Fuentes APRN - CNP [facility]   8/7/2024  8:00 AM Enrico Lewis MD [facility]

## 2024-05-31 NOTE — PROGRESS NOTES
Karla Hart  : 1960  Primary: Medicare Part A And B (Medicare)  [facility] [place]  [address] A  [facility] [address]  Phone: [phone]  Fax: [phone] Plan Frequency: 2 times per week for 60 days    Plan of Care/Certification Expiration Date: 24 (Plan of Care Start Date 2024)        Plan of Care/Certification Expiration Date:  Plan of Care/Certification Expiration Date: 24 (Plan of Care Start Date 2024)    Frequency/Duration: Plan Frequency: 2 times per week for 60 days      Time In/Out:          PT Visit Info:         Visit Count:  7                OUTPATIENT PHYSICAL THERAPY:             Progress Report 2024               Episode (Pain in Thoracic)         Treatment Diagnosis:     Pain in thoracic spine  Muscle weakness (generalized)  Abnormal posture  Medical/Referring Diagnosis:    Pain in thoracic spine    Referring Physician:  Dolly Matt PA MD Orders:  PT Eval and Treat   Return MD Appt:  2024  Date of Onset:  Onset Date: 24 (Fell, 2nd Fall 3/5/2024, 3rd Fall 3/30/2024)     Allergies:  Crab extract, Cephalexin, Morphine, Morphine, Oxycodone-acetaminophen, Zolpidem, Adhesive tape, and Cephalexin  Restrictions/Precautions:    Fall Precautions: Fall Risk  History of Anxiety, Ashtma, Afib, Diabetes, High Blood Pressure, Difficulty Sleeping, Frequent Falls, Urinary Incontience, Left Wrist Fracture , Numbness, Right Rotator Cuff Repair, Right Knee Replacement , ACDF , Lumbar Neurostimulator       Medications Last Reviewed:  2024      OBJECTIVE   .  Patient denies any increase of symptoms with cough, sneeze or valsalva. Patient denies any saddle paresthesia or bowel/bladder deficits.   Patient denies any headaches, changes in vision, dizziness, vertigo, nausea, drop attacks, black outs, tinnitus, dysphagia, dysarthria, LE symptoms or

## 2024-06-03 ENCOUNTER — HOSPITAL ENCOUNTER (OUTPATIENT)
Dept: PHYSICAL THERAPY | Age: 64
Setting detail: RECURRING SERIES
Discharge: HOME | End: 2024-06-06
Payer: MEDICARE

## 2024-06-03 PROCEDURE — 97140 MANUAL THERAPY 1/> REGIONS: CPT

## 2024-06-03 ASSESSMENT — PAIN SCALES - GENERAL: PAINLEVEL_OUTOF10: 6

## 2024-06-03 NOTE — PROGRESS NOTES
Karla Hart  : 1960  Primary: Medicare Part A And B (Medicare)  Secondary: GELACIO LEUNG [facility] [place]  [address] A  [facility] [address]  Phone: [phone]  Fax: [phone] Plan Frequency: 2 times per week for 60 days    Plan of Care/Certification Expiration Date: 24 (Plan of Care Start Date 2024)        Plan of Care/Certification Expiration Date:  Plan of Care/Certification Expiration Date: 24 (Plan of Care Start Date 2024)    Frequency/Duration: Plan Frequency: 2 times per week for 60 days      Time In/Out:   Time In: 1430  Time Out: 1525      PT Visit Info:         Visit Count:  8    OUTPATIENT PHYSICAL THERAPY:   Treatment Note 6/3/2024       Episode  (Pain in Thoracic)               Treatment Diagnosis:    Pain in thoracic spine  Muscle weakness (generalized)  Abnormal posture  Medical/Referring Diagnosis:    Pain in thoracic spine    Referring Physician:  Dolly Matt PA MD Orders:  PT Eval and Treat   Return MD Appt:  2024   Date of Onset:  Onset Date: 24 (Fell, 2nd Fall 3/5/2024, 3rd Fall 3/30/2024)     Allergies:   Crab extract, Cephalexin, Morphine, Morphine, Oxycodone-acetaminophen, Zolpidem, Adhesive tape, and Cephalexin  Restrictions/Precautions:   Fall Precautions: Fall Risk  History of Anxiety, Ashtma, Afib, Diabetes, High Blood Pressure, Difficulty Sleeping, Frequent Falls, Urinary Incontience, Left Wrist Fracture , Numbness, Right Rotator Cuff Repair, Right Knee Replacement , ACDF , Lumbar Neurostimulator   Interventions Planned (Treatment may consist of any combination of the following):     See Assessment Note    Subjective Comments:   Patient reports her right ribs are still hurting.  Initial Pain Level::     6/10  Post Session Pain Level:       4/10  Medications Last Reviewed:  6/3/2024  Updated Objective Findings:   Mobility: Thoracic Hypomobility .  Treatment

## 2024-06-12 ENCOUNTER — HOSPITAL ENCOUNTER (OUTPATIENT)
Dept: PHYSICAL THERAPY | Age: 64
Setting detail: RECURRING SERIES
Discharge: HOME | End: 2024-06-15
Payer: MEDICARE

## 2024-06-12 PROCEDURE — 97140 MANUAL THERAPY 1/> REGIONS: CPT

## 2024-06-12 PROCEDURE — 97110 THERAPEUTIC EXERCISES: CPT

## 2024-06-12 ASSESSMENT — PAIN SCALES - GENERAL: PAINLEVEL_OUTOF10: 4

## 2024-06-12 NOTE — PROGRESS NOTES
2/10  Medications Last Reviewed:  6/12/2024  Updated Objective Findings:   Mobility: Thoracic Hypomobility .  Treatment   THERAPEUTIC EXERCISE: (15 minutes):    Exercises per grid below to improve mobility, strength, balance, and coordination.  Required moderate visual, verbal, manual, and tactile cues to promote proper body alignment, promote proper body posture, promote proper body mechanics, and promote proper body breathing techniques.  Progressed resistance, range, repetitions, and complexity of movement as indicated.   Date:  5/31/2024 Date:  6/3/2024 Date:  6/12/2024   Activity/Exercise Parameters Parameters Parameters   Latissimus Dorsi Stretch      Thoracic Mobilization Seated Chair with Pillow 2 x 10     Cervical AROM      Swiss Ball      Rows   3 x 20 Red   Shoulder Extension/Low Rows   3 x 10 Yellow   Trunk Rotation      Shoulder Abduction      Trunk Flexion 3 x 20 with Foam Roller Seated     TRX Abduction 2 x 10  Seated Flexion/Extension 3 x 10     Abduction 3 x 10     Time spent with patient reviewing proper muscle recruitment and technique with exercises.     MANUAL THERAPY: (38 minutes):   Joint mobilization and Soft tissue mobilization was utilized and necessary because of the patient's restricted joint motion, painful spasm, loss of articular motion, and restricted motion of soft tissue.   Soft Tissue Mobilization Left and Right Upper and Mid Trapezius, Levator Scapula, Lower Trapezius, Paraspinals Cervical and Thoracic, also cupping.  Joint Mobilization Grade II/III CPA and UPA Thoracic Spine, Prone    MODALITIES: (0 minutes):        None Today      HEP: As above; handouts given to patient for all exercises.     Treatment/Session Summary:    Treatment Assessment:   Patient reported decreased pain and discomfort with treatment. Patient was able to progress back into graded ROM and muscle activation. She would benefit from continued graded progression to progress

## 2024-06-13 ENCOUNTER — HOSPITAL ENCOUNTER (OUTPATIENT)
Dept: PHYSICAL THERAPY | Age: 64
Setting detail: RECURRING SERIES
Discharge: HOME | End: 2024-06-16
Payer: MEDICARE

## 2024-06-13 PROCEDURE — 97140 MANUAL THERAPY 1/> REGIONS: CPT

## 2024-06-13 PROCEDURE — 97110 THERAPEUTIC EXERCISES: CPT

## 2024-06-13 ASSESSMENT — PAIN SCALES - GENERAL: PAINLEVEL_OUTOF10: 4

## 2024-06-13 NOTE — PROGRESS NOTES
Karla Hart  : 1960  Primary: Medicare Part A And B (Medicare)  Secondary: GELACIO LEUNG [facility] [place]  [address] A  [facility] [address]  Phone: [phone]  Fax: [phone] Plan Frequency: 2 times per week for 60 days    Plan of Care/Certification Expiration Date: 24 (Plan of Care Start Date 2024)        Plan of Care/Certification Expiration Date:  Plan of Care/Certification Expiration Date: 24 (Plan of Care Start Date 2024)    Frequency/Duration: Plan Frequency: 2 times per week for 60 days      Time In/Out:   Time In: 655  Time Out: 5      PT Visit Info:         Visit Count:  10    OUTPATIENT PHYSICAL THERAPY:   Treatment Note 2024       Episode  (Pain in Thoracic)               Treatment Diagnosis:    Pain in thoracic spine  Muscle weakness (generalized)  Abnormal posture  Medical/Referring Diagnosis:    Pain in thoracic spine    Referring Physician:  Dolly Matt PA MD Orders:  PT Eval and Treat   Return MD Appt:  2024   Date of Onset:  Onset Date: 24 (Fell, 2nd Fall 3/5/2024, 3rd Fall 3/30/2024)     Allergies:   Crab extract, Cephalexin, Morphine, Morphine, Oxycodone-acetaminophen, Zolpidem, Adhesive tape, and Cephalexin  Restrictions/Precautions:   Fall Precautions: Fall Risk  History of Anxiety, Ashtma, Afib, Diabetes, High Blood Pressure, Difficulty Sleeping, Frequent Falls, Urinary Incontience, Left Wrist Fracture , Numbness, Right Rotator Cuff Repair, Right Knee Replacement , ACDF , Lumbar Neurostimulator   Interventions Planned (Treatment may consist of any combination of the following):     See Assessment Note    Subjective Comments:   Patient reports she is feeling better after previous session.  Initial Pain Level::     4/10  Post Session Pain Level:       2/10  Medications Last Reviewed:  2024  Updated Objective Findings:   Mobility: Thoracic Hypomobility

## 2024-06-19 ENCOUNTER — HOSPITAL ENCOUNTER (OUTPATIENT)
Dept: PHYSICAL THERAPY | Age: 64
Setting detail: RECURRING SERIES
Discharge: HOME | End: 2024-06-22
Payer: MEDICARE

## 2024-06-19 PROCEDURE — 97140 MANUAL THERAPY 1/> REGIONS: CPT

## 2024-06-19 PROCEDURE — 97110 THERAPEUTIC EXERCISES: CPT

## 2024-06-19 ASSESSMENT — PAIN SCALES - GENERAL: PAINLEVEL_OUTOF10: 2

## 2024-06-19 NOTE — PROGRESS NOTES
awareness to progress functionally.  Communication/Consultation:   HEP reviewed  Equipment provided today:  None  Recommendations/Intent for next treatment session: Next visit will focus on decreasing pain, improving mobility, flexibility, ROM, strength, balance, and function.    >Total Treatment Billable Duration:  53 minutes  Time In: 1330  Time Out: 1426    Dru Zambrano PT         Charge Capture  [employer] Portal  Appt Desk     Future Appointments   Date Time Provider Department Center   6/26/2024 11:00 AM Dru Zambrano, [facility]   6/28/2024  9:00 AM Dru Zambrano PT [facility]   7/1/2024  9:00 AM Neil Haines MD [facility]   7/18/2024 10:20 AM Alis Fuentes APRN - CNP [facility]   8/7/2024  8:00 AM Enrico Lewis MD [facility]

## 2024-06-26 ENCOUNTER — HOSPITAL ENCOUNTER (OUTPATIENT)
Dept: PHYSICAL THERAPY | Age: 64
Setting detail: RECURRING SERIES
Discharge: HOME | End: 2024-06-29
Payer: MEDICARE

## 2024-06-26 PROCEDURE — 97140 MANUAL THERAPY 1/> REGIONS: CPT

## 2024-06-26 PROCEDURE — 97110 THERAPEUTIC EXERCISES: CPT

## 2024-06-26 ASSESSMENT — PAIN SCALES - GENERAL: PAINLEVEL_OUTOF10: 2

## 2024-06-26 NOTE — PROGRESS NOTES
progression of strength, stability, and postural awareness to progress functionally.  Communication/Consultation:   HEP reviewed  Equipment provided today:  None  Recommendations/Intent for next treatment session: Next visit will focus on decreasing pain, improving mobility, flexibility, ROM, strength, balance, and function.    >Total Treatment Billable Duration:  53 minutes       Dru Zambrano PT         Charge Capture  [employer] Portal  Appt Desk     Future Appointments   Date Time Provider Department Center   6/28/2024  7:00 AM Dru Zambrano, CHERELLE [facility]   7/1/2024  9:00 AM Neil Haines MD [facility]   7/18/2024 10:20 AM Alis Fuentes, APRN - CNP [facility]   8/7/2024  8:00 AM Enrico Lewis MD [facility]

## 2024-07-01 ENCOUNTER — HOSPITAL ENCOUNTER (OUTPATIENT)
Dept: PHYSICAL THERAPY | Age: 64
Setting detail: RECURRING SERIES
Discharge: HOME | End: 2024-07-04
Payer: MEDICARE

## 2024-07-01 ENCOUNTER — OFFICE VISIT (OUTPATIENT)
Dept: NEUROSURGERY | Age: 64
End: 2024-07-01
Payer: MEDICARE

## 2024-07-01 VITALS
TEMPERATURE: 98 F | WEIGHT: 200.4 LBS | BODY MASS INDEX: 32.21 KG/M2 | HEIGHT: 66 IN | SYSTOLIC BLOOD PRESSURE: 123 MMHG | DIASTOLIC BLOOD PRESSURE: 100 MMHG | HEART RATE: 62 BPM | OXYGEN SATURATION: 96 %

## 2024-07-01 DIAGNOSIS — M47.816 FACET HYPERTROPHY OF LUMBAR REGION: ICD-10-CM

## 2024-07-01 DIAGNOSIS — M54.16 LUMBAR RADICULOPATHY: Primary | ICD-10-CM

## 2024-07-01 DIAGNOSIS — M48.061 NEUROFORAMINAL STENOSIS OF LUMBAR SPINE: ICD-10-CM

## 2024-07-01 DIAGNOSIS — M51.36 DEGENERATIVE DISC DISEASE, LUMBAR: ICD-10-CM

## 2024-07-01 PROCEDURE — 3074F SYST BP LT 130 MM HG: CPT | Performed by: NEUROLOGICAL SURGERY

## 2024-07-01 PROCEDURE — G8417 CALC BMI ABV UP PARAM F/U: HCPCS | Performed by: NEUROLOGICAL SURGERY

## 2024-07-01 PROCEDURE — G8427 DOCREV CUR MEDS BY ELIG CLIN: HCPCS | Performed by: NEUROLOGICAL SURGERY

## 2024-07-01 PROCEDURE — 99214 OFFICE O/P EST MOD 30 MIN: CPT | Performed by: NEUROLOGICAL SURGERY

## 2024-07-01 PROCEDURE — 3017F COLORECTAL CA SCREEN DOC REV: CPT | Performed by: NEUROLOGICAL SURGERY

## 2024-07-01 PROCEDURE — 1036F TOBACCO NON-USER: CPT | Performed by: NEUROLOGICAL SURGERY

## 2024-07-01 PROCEDURE — 97110 THERAPEUTIC EXERCISES: CPT

## 2024-07-01 PROCEDURE — 3080F DIAST BP >= 90 MM HG: CPT | Performed by: NEUROLOGICAL SURGERY

## 2024-07-01 RX ORDER — METHYLPREDNISOLONE 4 MG/1
TABLET ORAL
Qty: 1 KIT | Refills: 0 | Status: SHIPPED | OUTPATIENT
Start: 2024-07-01 | End: 2024-07-07

## 2024-07-01 ASSESSMENT — PAIN SCALES - GENERAL: PAINLEVEL_OUTOF10: 2

## 2024-07-01 NOTE — THERAPY DISCHARGE
Karla Hart  : 1960  Primary: Medicare Part A And B (Medicare)  Secondary: GELACIO LEUNG [facility] [place]  [address]I A  [facility] [address]  Phone: [phone]  Fax: [phone] Plan Frequency: 2 times per week for 60 days    Plan of Care/Certification Expiration Date: 24 (Plan of Care Start Date 2024)        Plan of Care/Certification Expiration Date:  Plan of Care/Certification Expiration Date: 24 (Plan of Care Start Date 2024)    Frequency/Duration: Plan Frequency: 2 times per week for 60 days      Time In/Out:   Time In: 1105  Time Out: 1205      PT Visit Info:         Visit Count:  13                OUTPATIENT PHYSICAL THERAPY:             Discharge Summary 2024               Episode (Pain in Thoracic)         Treatment Diagnosis:     Pain in thoracic spine  Muscle weakness (generalized)  Abnormal posture  Medical/Referring Diagnosis:    Pain in thoracic spine    Referring Physician:  Dolly Matt PA MD Orders:  PT Eval and Treat   Return MD Appt:  2024  Date of Onset:  Onset Date: 24 (Fell, 2nd Fall 3/5/2024, 3rd Fall 3/30/2024)     Allergies:  Crab extract, Cephalexin, Morphine, Morphine, Oxycodone-acetaminophen, Zolpidem, Adhesive tape, and Cephalexin  Restrictions/Precautions:    Fall Precautions: Fall Risk  History of Anxiety, Ashtma, Afib, Diabetes, High Blood Pressure, Difficulty Sleeping, Frequent Falls, Urinary Incontience, Left Wrist Fracture , Numbness, Right Rotator Cuff Repair, Right Knee Replacement , ACDF , Lumbar Neurostimulator       Medications Last Reviewed:  2024      OBJECTIVE   .  Patient denies any increase of symptoms with cough, sneeze or valsalva. Patient denies any saddle paresthesia or bowel/bladder deficits.   Patient denies any headaches, changes in vision, dizziness, vertigo, nausea, drop attacks, black outs, tinnitus, dysphagia,

## 2024-07-01 NOTE — PROGRESS NOTES
is not sure when she is returning exactly. She reports low back is sore from MD evaluation this morning.  Initial Pain Level::     2/10  Post Session Pain Level:       2/10  Medications Last Reviewed:  7/1/2024  Updated Objective Findings:  See Discharge Note from today.  Treatment   THERAPEUTIC EXERCISE: (43 minutes):    Exercises per grid below to improve mobility, strength, balance, and coordination.  Required moderate visual, verbal, manual, and tactile cues to promote proper body alignment, promote proper body posture, promote proper body mechanics, and promote proper body breathing techniques.  Progressed resistance, range, repetitions, and complexity of movement as indicated.   Date:  7/1/2024 Date:  6/19/2024 Date:  6/26/2024   Activity/Exercise Parameters Parameters Parameters   Latissimus Dorsi Stretch      Thoracic Mobilization Seated 3 x 10     Cervical AROM      Swiss Ball 3 x 20 Seated Flexion and Extension 3 x 20 Seated Flexion and Extension     3 x 20 Seated Flexion and Extension   Rows 3 x 20 Red 3 x 20 Red 3 x 20 Red   Shoulder Extension/Low Rows 3 x 10 Yellow 3 x 20 Red 3 x 10 Yellow   TA Abdominal Brace and Clam 3 x 10 Yellow     Knee Fall Outs Supine Yellow 3 x 10     March Supine March Yellow 3 x 10     TRX   Seated Flexion/Extension 3 x 10     Abduction 3 x 10     Time spent with patient reviewing proper muscle recruitment and technique with exercises.     MANUAL THERAPY: (0 minutes):   Joint mobilization and Soft tissue mobilization was utilized and necessary because of the patient's restricted joint motion, painful spasm, loss of articular motion, and restricted motion of soft tissue.   None Today    MODALITIES: (15 minutes):        *  Cold Pack Therapy in order to provide analgesia, relieve muscle spasm, and reduce inflammation and edema. No Charge     HEP: As above; handouts given to patient for all exercises.     Treatment/Session Summary:    Treatment Assessment:   Patient demonstrated

## 2024-07-01 NOTE — PROGRESS NOTES
radiculopathy  M54.16       2. Degenerative disc disease, lumbar  M51.36       3. Facet hypertrophy of lumbar region  M47.816       4. Neuroforaminal stenosis of lumbar spine  M48.061           Neil Haines MD, FAANS  Neurosurgeon  [facility]  [facility]     Notes are transcribed with [employer], a medical voice recording dictation service, and may contain minor errors.

## 2024-08-07 ENCOUNTER — OFFICE VISIT (OUTPATIENT)
Age: 64
End: 2024-08-07
Payer: MEDICARE

## 2024-08-07 VITALS
BODY MASS INDEX: 31.5 KG/M2 | WEIGHT: 196 LBS | HEIGHT: 66 IN | DIASTOLIC BLOOD PRESSURE: 56 MMHG | SYSTOLIC BLOOD PRESSURE: 110 MMHG | HEART RATE: 60 BPM

## 2024-08-07 DIAGNOSIS — R00.0 TACHYCARDIA: Primary | ICD-10-CM

## 2024-08-07 DIAGNOSIS — R06.09 DOE (DYSPNEA ON EXERTION): ICD-10-CM

## 2024-08-07 DIAGNOSIS — I10 PRIMARY HYPERTENSION: ICD-10-CM

## 2024-08-07 PROCEDURE — 3078F DIAST BP <80 MM HG: CPT | Performed by: INTERNAL MEDICINE

## 2024-08-07 PROCEDURE — 99214 OFFICE O/P EST MOD 30 MIN: CPT | Performed by: INTERNAL MEDICINE

## 2024-08-07 PROCEDURE — 3074F SYST BP LT 130 MM HG: CPT | Performed by: INTERNAL MEDICINE

## 2024-08-07 PROCEDURE — G8427 DOCREV CUR MEDS BY ELIG CLIN: HCPCS | Performed by: INTERNAL MEDICINE

## 2024-08-07 PROCEDURE — 1036F TOBACCO NON-USER: CPT | Performed by: INTERNAL MEDICINE

## 2024-08-07 PROCEDURE — G8417 CALC BMI ABV UP PARAM F/U: HCPCS | Performed by: INTERNAL MEDICINE

## 2024-08-07 PROCEDURE — 3017F COLORECTAL CA SCREEN DOC REV: CPT | Performed by: INTERNAL MEDICINE

## 2024-08-07 ASSESSMENT — ENCOUNTER SYMPTOMS
ABDOMINAL PAIN: 0
EYE PAIN: 0
NAIL CHANGES: 0
STRIDOR: 0
APHONIA: 0
COUGH: 0

## 2024-08-07 NOTE — PROGRESS NOTES
[address], [address]  [place] [address]  PHONE: [phone]    SUBJECTIVE:   Karla Hart is a 63 y.o. female 1960      History of present illness: 63 y.o. female presented for follow-up 8/17/2024 since her last appointment the patient saw Debra Estrada/17/2024. Returned from [facility]. She had COVID again. She was there for 5 weeks. She is recovering. Here with Daughter in law.  She has had a comprehensive workup for shortness of breath.  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  She had another flare after recent COVID-19 infection but is now exercising on a regular basis.  She had lower oxygen saturations during her trip to [facility].  She did not bring her CPAP with her.  She felt that her symptoms improved after coming back to [place] and being more normal altitude.    Interval history:  previous history of COVID-19 infection comprehensive cardiac workup with normal echocardiogram right and left heart catheterization performed.  Patient with profoundly elevated heart rates consistent with IST following COVID-19 infection.  Patient placed on Corlanor with improvement of symptoms.  She continues to have high measured blood pressure readings at home dizziness and fatigue at times.  All of her blood pressure measurements in our office have been very low most with systolic pressures less than 100 mmHg.     Patient previously participating in diving and physical activities.  She began having dyspnea on exertion in summer 2023.  This followed knee surgery.  She presented for follow-up to evaluate nuclear stress perfusion study results today normal perfusion but patient had very short exercise time.  Ambulated down hallway today with heart rates in excess of 120 bpm with desaturation to 93% oxygen.      history of atrial fibrillation documented at the [facility]

## 2024-08-12 ENCOUNTER — OFFICE VISIT (OUTPATIENT)
Dept: PULMONOLOGY | Age: 64
End: 2024-08-12
Payer: MEDICARE

## 2024-08-12 VITALS
WEIGHT: 201 LBS | RESPIRATION RATE: 19 BRPM | TEMPERATURE: 97 F | SYSTOLIC BLOOD PRESSURE: 125 MMHG | BODY MASS INDEX: 32.3 KG/M2 | HEIGHT: 66 IN | HEART RATE: 75 BPM | OXYGEN SATURATION: 95 % | DIASTOLIC BLOOD PRESSURE: 82 MMHG

## 2024-08-12 DIAGNOSIS — R06.00 DYSPNEA, UNSPECIFIED TYPE: ICD-10-CM

## 2024-08-12 DIAGNOSIS — J45.909 UNCOMPLICATED ASTHMA, UNSPECIFIED ASTHMA SEVERITY, UNSPECIFIED WHETHER PERSISTENT: Primary | ICD-10-CM

## 2024-08-12 DIAGNOSIS — G47.33 OSA ON CPAP: ICD-10-CM

## 2024-08-12 DIAGNOSIS — E66.9 OBESITY (BMI 30.0-34.9): ICD-10-CM

## 2024-08-12 PROCEDURE — 3017F COLORECTAL CA SCREEN DOC REV: CPT | Performed by: NURSE PRACTITIONER

## 2024-08-12 PROCEDURE — G8417 CALC BMI ABV UP PARAM F/U: HCPCS | Performed by: NURSE PRACTITIONER

## 2024-08-12 PROCEDURE — G8427 DOCREV CUR MEDS BY ELIG CLIN: HCPCS | Performed by: NURSE PRACTITIONER

## 2024-08-12 PROCEDURE — 3074F SYST BP LT 130 MM HG: CPT | Performed by: NURSE PRACTITIONER

## 2024-08-12 PROCEDURE — 3079F DIAST BP 80-89 MM HG: CPT | Performed by: NURSE PRACTITIONER

## 2024-08-12 PROCEDURE — 1036F TOBACCO NON-USER: CPT | Performed by: NURSE PRACTITIONER

## 2024-08-12 PROCEDURE — 99214 OFFICE O/P EST MOD 30 MIN: CPT | Performed by: NURSE PRACTITIONER

## 2024-08-12 ASSESSMENT — ENCOUNTER SYMPTOMS
SPUTUM PRODUCTION: 1
SHORTNESS OF BREATH: 0
WHEEZING: 1
COUGH: 1
HEMOPTYSIS: 0

## 2024-08-12 NOTE — PROGRESS NOTES
Food Insecurity     Worried about Running Out of Food in the Last Year: Never true     Ran Out of Food in the Last Year: Never true   Transportation Needs: No Transportation Needs (9/29/2023)    Received from [facility]    Transportation Needs     Lack of Transportation: No   Physical Activity: Insufficiently Active (9/29/2023)    Received from [facility]    Physical Activity     Days of Exercise per Week: 2     Minutes of Exercise per Session: 20     Total Minutes of Exercise per Week: 40   Stress: No Stress Concern Present (9/29/2023)    Received from [facility]    Stress     Feeling of Stress : Only a little   Social Connections: Moderately Integrated (9/29/2023)    Received from [facility]    Social Connections     Frequency of Communication with Friends and Family: More than three times a week     Frequency of Social Gatherings with Friends and Family: Once a week   Intimate Partner Violence: Not At Risk (10/6/2023)    Received from [facility]    Intimate Partner Violence     Fear of Current or Ex-Partner: No     Emotionally Abused: No     Physically Abused: No     Sexually Abused: No   Housing Stability: Not At Risk (9/29/2023)    Received from [facility]    Housing Stability     Was there a time when you did not have a steady place to sleep: No     Worried that the place you are staying is making you sick: No       Family History   Problem Relation Age of Onset    Parkinson's Disease Father     Heart Disease Father     Diabetes Father     Hypertension Father     Parkinsonism Father     Breast Cancer Other 68    Dementia Maternal Grandfather     Stroke Paternal Grandfather     Parkinsonism Paternal Aunt     Parkinsonism Paternal Uncle        Allergies   Allergen Reactions    Crab Extract Diarrhea    Cephalexin      Other reaction(s): itching    Morphine Other (See Comments)     Hallucinations, itching

## 2024-08-12 NOTE — PATIENT INSTRUCTIONS
Overnight oximetry on CPAP.  If there is significant desaturation, would recommend split-night sleep study.  Will place referral to sleep center for long-term management of SARI/CPAP therapy.  Weight loss is encouraged.    Continue Trelegy 1 inhalation every morning, gargle with water after use.    Albuterol 2 puffs 4 times daily if needed for shortness of breath or wheezing.    The company who will be taking care of your  overnight oximetry study is:        Address: [address] [place] [address] [address], [place] [address]  Phone: [phone] Option #1  Fax: [phone]

## 2024-08-19 DIAGNOSIS — I48.91 ATRIAL FIBRILLATION (HCC): Primary | ICD-10-CM

## 2024-08-19 DIAGNOSIS — I20.0 ACCELERATING ANGINA (HCC): ICD-10-CM

## 2024-08-19 DIAGNOSIS — I10 HYPERTENSION: Primary | ICD-10-CM

## 2024-08-19 DIAGNOSIS — I10 HYPERTENSION: ICD-10-CM

## 2024-08-20 RX ORDER — LISINOPRIL 10 MG/1
5 TABLET ORAL DAILY
Qty: 45 TABLET | Refills: 3 | Status: SHIPPED | OUTPATIENT
Start: 2024-08-20 | End: 2025-08-15

## 2024-08-20 NOTE — TELEPHONE ENCOUNTER
Requested Prescriptions     Pending Prescriptions Disp Refills    lisinopril (PRINIVIL;ZESTRIL) 10 MG tablet 45 tablet 3     Sig: Take 0.5 tablets by mouth daily

## 2024-08-21 NOTE — TELEPHONE ENCOUNTER
Requested Prescriptions     Pending Prescriptions Disp Refills    ivabradine (CORLANOR) 5 MG TABS tablet 180 tablet 3     Sig: Take 1 tablet by mouth 2 times daily (with meals)

## 2024-11-04 ENCOUNTER — OFFICE VISIT (OUTPATIENT)
Dept: NEUROSURGERY | Age: 64
End: 2024-11-04
Payer: MEDICARE

## 2024-11-04 VITALS
WEIGHT: 204 LBS | OXYGEN SATURATION: 98 % | HEART RATE: 84 BPM | TEMPERATURE: 97.3 F | BODY MASS INDEX: 32.93 KG/M2 | DIASTOLIC BLOOD PRESSURE: 86 MMHG | SYSTOLIC BLOOD PRESSURE: 120 MMHG

## 2024-11-04 DIAGNOSIS — M54.16 LUMBAR RADICULOPATHY: Primary | ICD-10-CM

## 2024-11-04 DIAGNOSIS — M47.816 FACET HYPERTROPHY OF LUMBAR REGION: ICD-10-CM

## 2024-11-04 DIAGNOSIS — M51.361 DEGENERATION OF INTERVERTEBRAL DISC OF LUMBAR REGION WITH LOWER EXTREMITY PAIN: ICD-10-CM

## 2024-11-04 DIAGNOSIS — R52 PAIN MANAGEMENT: ICD-10-CM

## 2024-11-04 DIAGNOSIS — M48.061 NEUROFORAMINAL STENOSIS OF LUMBAR SPINE: ICD-10-CM

## 2024-11-04 PROCEDURE — 99214 OFFICE O/P EST MOD 30 MIN: CPT | Performed by: NEUROLOGICAL SURGERY

## 2024-11-04 PROCEDURE — 3074F SYST BP LT 130 MM HG: CPT | Performed by: NEUROLOGICAL SURGERY

## 2024-11-04 PROCEDURE — 1036F TOBACCO NON-USER: CPT | Performed by: NEUROLOGICAL SURGERY

## 2024-11-04 PROCEDURE — G8427 DOCREV CUR MEDS BY ELIG CLIN: HCPCS | Performed by: NEUROLOGICAL SURGERY

## 2024-11-04 PROCEDURE — 3017F COLORECTAL CA SCREEN DOC REV: CPT | Performed by: NEUROLOGICAL SURGERY

## 2024-11-04 PROCEDURE — 3079F DIAST BP 80-89 MM HG: CPT | Performed by: NEUROLOGICAL SURGERY

## 2024-11-04 PROCEDURE — G8417 CALC BMI ABV UP PARAM F/U: HCPCS | Performed by: NEUROLOGICAL SURGERY

## 2024-11-04 PROCEDURE — G8484 FLU IMMUNIZE NO ADMIN: HCPCS | Performed by: NEUROLOGICAL SURGERY

## 2024-11-04 NOTE — PROGRESS NOTES
not necessarily correlate with her imaging findings.  I would like to obtain an EMG/NCV.  If her findings were more classic and consistent with a right L5 radiculopathy then it would be beneficial to treat with a L5-S1 TLIF on the right.  However at this time her symptoms above are changed since we saw her last and have also intensified.  We will see her back following a right lower extremity EMG/NCV.  I have also recommended that for her point tenderness overlying her greater trochanters as they could give her cortisone injections for trochanteric bursitis.      ICD-10-CM    1. Lumbar radiculopathy  M54.16       2. Degeneration of intervertebral disc of lumbar region with lower extremity pain  M51.361       3. Facet hypertrophy of lumbar region  M47.816       4. Neuroforaminal stenosis of lumbar spine  M48.061           Neil Haines MD, FAANS, FCNS   Neurosurgeon  [facility]  [facility]   11/4/2024 at 1:54 PM    Notes are transcribed with [employer], a medical voice recording dictation service, and may contain minor errors.

## 2024-11-19 DIAGNOSIS — I20.0 ACCELERATING ANGINA (HCC): ICD-10-CM

## 2024-11-19 DIAGNOSIS — I10 HYPERTENSION: ICD-10-CM

## 2024-11-19 DIAGNOSIS — I48.91 ATRIAL FIBRILLATION (HCC): ICD-10-CM

## 2024-11-19 RX ORDER — IVABRADINE 5 MG/1
5 TABLET, FILM COATED ORAL 2 TIMES DAILY WITH MEALS
Qty: 180 TABLET | Refills: 3 | Status: SHIPPED | OUTPATIENT
Start: 2024-11-19

## 2024-11-19 NOTE — TELEPHONE ENCOUNTER
I called the pt and she confirmed that she does [facility].   Requested Prescriptions     Pending Prescriptions Disp Refills    ivabradine (CORLANOR) 5 MG TABS tablet 180 tablet 3     Sig: Take 1 tablet by mouth 2 times daily (with meals)

## 2024-11-26 ENCOUNTER — HOSPITAL ENCOUNTER (OUTPATIENT)
Dept: PHYSICAL THERAPY | Age: 64
Setting detail: RECURRING SERIES
Discharge: HOME | End: 2024-11-29
Payer: MEDICARE

## 2024-11-26 DIAGNOSIS — M54.16 RADICULOPATHY, LUMBAR REGION: Primary | ICD-10-CM

## 2024-11-26 DIAGNOSIS — R26.2 DIFFICULTY IN WALKING, NOT ELSEWHERE CLASSIFIED: ICD-10-CM

## 2024-11-26 DIAGNOSIS — M62.81 MUSCLE WEAKNESS (GENERALIZED): ICD-10-CM

## 2024-11-26 PROCEDURE — 97161 PT EVAL LOW COMPLEX 20 MIN: CPT

## 2024-11-26 PROCEDURE — 97110 THERAPEUTIC EXERCISES: CPT

## 2024-11-26 ASSESSMENT — PAIN SCALES - GENERAL: PAINLEVEL_OUTOF10: 7

## 2024-11-26 NOTE — PROGRESS NOTES
Karla Hart  : 1960  Primary: Medicare Part A And B (Medicare)  Secondary: [facility] [place]  Amol ELI A  [facility] [address]  Phone: [phone]  Fax: [phone] Plan Frequency: 2 times per week for 60 days  Plan of Care/Certification Expiration Date: 25 (Plan of Care Start Date 2024)        Plan of Care/Certification Expiration Date:  Plan of Care/Certification Expiration Date: 25 (Plan of Care Start Date 2024)    Frequency/Duration: Plan Frequency: 2 times per week for 60 days      Time In/Out:   Time In: 858  Time Out: 1008      PT Visit Info:    Progress Note Counter: 1      Visit Count:  1    OUTPATIENT PHYSICAL THERAPY:   Treatment Note 2024       Episode  (Lumbar Radiculopathy)               Treatment Diagnosis:    Radiculopathy, lumbar region  Muscle weakness (generalized)  Difficulty in walking, not elsewhere classified  Medical/Referring Diagnosis:    Lumbar radiculopathy  Degeneration of intervertebral disc of lumbar region with lower extremity pain  Facet hypertrophy of lumbar region  Neuroforaminal stenosis of lumbar spine  Pain management  Referring Physician:  Neil Haines MD MD Orders:  PT Eval and Treat   Return MD Appt:  2024   Date of Onset:  Onset Date: 24     Allergies:   Crab extract, Cephalexin, Morphine, Morphine, Oxycodone-acetaminophen, Zolpidem, Adhesive tape, and Cephalexin  Restrictions/Precautions:   History of Right TKA, Anxiety, Arthritis, Asthma, Type 2 Diabetes, High Blood Pressure, Difficulty Sleeping, Frequent Falls, High Cholesterol, Incontinence, Left Wrist Fracture      Interventions Planned (Treatment may consist of any combination of the following):     See Assessment Note    Subjective Comments:   Patient reports pain across her low back, pain and numb down right.  Initial Pain Level::     7/10  Post Session Pain Level:       5/10  Medications Last

## 2024-11-26 NOTE — THERAPY EVALUATION
Karla Hart  : 1960  Primary: Medicare Part A And B (Medicare)  Secondary: [facility] [place]  Amol ELI A  [facility] [address]  Phone: [phone]  Fax: [phone] Plan Frequency: 2 times per week for 60 days    Plan of Care/Certification Expiration Date: 25 (Plan of Care Start Date 2024)        Plan of Care/Certification Expiration Date:  Plan of Care/Certification Expiration Date: 25 (Plan of Care Start Date 2024)    Frequency/Duration: Plan Frequency: 2 times per week for 60 days      Time In/Out:   Time In: 58  Time Out: 1008      PT Visit Info:    Progress Note Counter: 1      Visit Count:  1                OUTPATIENT PHYSICAL THERAPY:             Initial Assessment 2024               Episode (Lumbar Radiculopathy)         Treatment Diagnosis:     Radiculopathy, lumbar region  Muscle weakness (generalized)  Difficulty in walking, not elsewhere classified  Medical/Referring Diagnosis:    Lumbar radiculopathy  Degeneration of intervertebral disc of lumbar region with lower extremity pain  Facet hypertrophy of lumbar region  Neuroforaminal stenosis of lumbar spine  Pain management  Referring Physician:  Neil Haines MD MD Orders:  PT Eval and Treat   Return MD Appt:  2024  Date of Onset:  Onset Date: 24   History of Back Problems Since at   Allergies:  Crab extract, Cephalexin, Morphine, Morphine, Oxycodone-acetaminophen, Zolpidem, Adhesive tape, and Cephalexin  Restrictions/Precautions:    History of Right TKA, Anxiety, Arthritis, Asthma, Type 2 Diabetes, High Blood Pressure, Difficulty Sleeping, Frequent Falls, High Cholesterol, Incontinence, Left Wrist Fracture      Medications Last Reviewed:  2024     SUBJECTIVE   History of Injury/Illness (Reason for Referral):  Ms. Hart is a 64 y.o. female presenting to physical therapy with complaints of low back pain with pain down

## 2024-12-03 ENCOUNTER — APPOINTMENT (OUTPATIENT)
Dept: PHYSICAL THERAPY | Age: 64
End: 2024-12-03
Payer: MEDICARE

## 2024-12-05 ENCOUNTER — HOSPITAL ENCOUNTER (OUTPATIENT)
Dept: PHYSICAL THERAPY | Age: 64
Setting detail: RECURRING SERIES
Discharge: HOME | End: 2024-12-08
Payer: MEDICARE

## 2024-12-05 PROCEDURE — 97110 THERAPEUTIC EXERCISES: CPT

## 2024-12-05 PROCEDURE — 97140 MANUAL THERAPY 1/> REGIONS: CPT

## 2024-12-05 ASSESSMENT — PAIN SCALES - GENERAL: PAINLEVEL_OUTOF10: 7

## 2024-12-05 NOTE — PROGRESS NOTES
Karla Hart  : 1960  Primary: Medicare Part A And B (Medicare)  Secondary: [facility] [place]  Amol ELI A  [facility] [address]  Phone: [phone]  Fax: [phone] Plan Frequency: 2 times per week for 60 days  Plan of Care/Certification Expiration Date: 25 (Plan of Care Start Date 2024)        Plan of Care/Certification Expiration Date:  Plan of Care/Certification Expiration Date: 25 (Plan of Care Start Date 2024)    Frequency/Duration: Plan Frequency: 2 times per week for 60 days      Time In/Out:   Time In: 846  Time Out: 940      PT Visit Info:    Progress Note Counter: 2      Visit Count:  2    OUTPATIENT PHYSICAL THERAPY:   Treatment Note 2024       Episode  (Lumbar Radiculopathy)               Treatment Diagnosis:    Radiculopathy, lumbar region  Muscle weakness (generalized)  Difficulty in walking, not elsewhere classified  Medical/Referring Diagnosis:    Lumbar radiculopathy  Degeneration of intervertebral disc of lumbar region with lower extremity pain  Facet hypertrophy of lumbar region  Neuroforaminal stenosis of lumbar spine  Pain management  Referring Physician:  Neil Haines MD MD Orders:  PT Eval and Treat   Return MD Appt:  2024   Date of Onset:  Onset Date: 24     Allergies:   Crab extract, Cephalexin, Morphine, Morphine, Oxycodone-acetaminophen, Zolpidem, Adhesive tape, and Cephalexin  Restrictions/Precautions:   History of Right TKA, Anxiety, Arthritis, Asthma, Type 2 Diabetes, High Blood Pressure, Difficulty Sleeping, Frequent Falls, High Cholesterol, Incontinence, Left Wrist Fracture      Interventions Planned (Treatment may consist of any combination of the following):     See Assessment Note    Subjective Comments:   Patient reports that she is dealing with a respiratory infection, on antibiotics. She reports she has been working on her HEP, everyday three times per

## 2024-12-10 ENCOUNTER — HOSPITAL ENCOUNTER (OUTPATIENT)
Dept: PHYSICAL THERAPY | Age: 64
Setting detail: RECURRING SERIES
Discharge: HOME | End: 2024-12-13
Payer: MEDICARE

## 2024-12-10 ENCOUNTER — TELEPHONE (OUTPATIENT)
Dept: PULMONOLOGY | Age: 64
End: 2024-12-10

## 2024-12-10 PROCEDURE — 97110 THERAPEUTIC EXERCISES: CPT

## 2024-12-10 ASSESSMENT — PAIN SCALES - GENERAL: PAINLEVEL_OUTOF10: 4

## 2024-12-10 NOTE — PROGRESS NOTES
Karla Hart  : 1960  Primary: Medicare Part A And B (Medicare)  Secondary: [facility] [place]  Amol ROQUE  [facility] [address]  Phone: [phone]  Fax: [phone] Plan Frequency: 2 times per week for 60 days  Plan of Care/Certification Expiration Date: 25 (Plan of Care Start Date 2024)        Plan of Care/Certification Expiration Date:  Plan of Care/Certification Expiration Date: 25 (Plan of Care Start Date 2024)    Frequency/Duration: Plan Frequency: 2 times per week for 60 days      Time In/Out:   Time In: 1200  Time Out: 1257      PT Visit Info:    Progress Note Counter: 3      Visit Count:  3    OUTPATIENT PHYSICAL THERAPY:   Treatment Note 12/10/2024       Episode  (Lumbar Radiculopathy)               Treatment Diagnosis:    Radiculopathy, lumbar region  Muscle weakness (generalized)  Difficulty in walking, not elsewhere classified  Medical/Referring Diagnosis:    Lumbar radiculopathy  Degeneration of intervertebral disc of lumbar region with lower extremity pain  Facet hypertrophy of lumbar region  Neuroforaminal stenosis of lumbar spine  Pain management  Referring Physician:  Neil Haines MD MD Orders:  PT Eval and Treat   Return MD Appt:  2024   Date of Onset:  Onset Date: 24     Allergies:   Crab extract, Cephalexin, Morphine, Morphine, Oxycodone-acetaminophen, Zolpidem, Adhesive tape, and Cephalexin  Restrictions/Precautions:   History of Right TKA, Anxiety, Arthritis, Asthma, Type 2 Diabetes, High Blood Pressure, Difficulty Sleeping, Frequent Falls, High Cholesterol, Incontinence, Left Wrist Fracture      Interventions Planned (Treatment may consist of any combination of the following):     See Assessment Note    Subjective Comments:   Patient reports that she is feeling better. She reports still recovering from Walking Pneumonia, finishing up antibiotic.   Initial Pain Level::

## 2024-12-10 NOTE — TELEPHONE ENCOUNTER
Called patient in regards to the message that was sent into the office about her rescheduling her appointment to march since she has pneumonia. Left VM to see if that was intentional or if she wanted to keep the appointment.

## 2024-12-12 ENCOUNTER — HOSPITAL ENCOUNTER (OUTPATIENT)
Dept: PHYSICAL THERAPY | Age: 64
Setting detail: RECURRING SERIES
Discharge: HOME | End: 2024-12-15
Payer: MEDICARE

## 2024-12-12 PROCEDURE — 97110 THERAPEUTIC EXERCISES: CPT

## 2024-12-12 ASSESSMENT — PAIN SCALES - GENERAL: PAINLEVEL_OUTOF10: 4

## 2024-12-12 NOTE — PROGRESS NOTES
Karla Hart  : 1960  Primary: Medicare Part A And B (Medicare)  Secondary: [facility] [place]  Amol ELI A  [facility] [address]  Phone: [phone]  Fax: [phone] Plan Frequency: 2 times per week for 60 days  Plan of Care/Certification Expiration Date: 25 (Plan of Care Start Date 2024)        Plan of Care/Certification Expiration Date:  Plan of Care/Certification Expiration Date: 25 (Plan of Care Start Date 2024)    Frequency/Duration: Plan Frequency: 2 times per week for 60 days      Time In/Out:   Time In: 09  Time Out: 955      PT Visit Info:    Progress Note Counter: 4      Visit Count:  4    OUTPATIENT PHYSICAL THERAPY:   Treatment Note 2024       Episode  (Lumbar Radiculopathy)               Treatment Diagnosis:    Radiculopathy, lumbar region  Muscle weakness (generalized)  Difficulty in walking, not elsewhere classified  Medical/Referring Diagnosis:    Lumbar radiculopathy  Degeneration of intervertebral disc of lumbar region with lower extremity pain  Facet hypertrophy of lumbar region  Neuroforaminal stenosis of lumbar spine  Pain management  Referring Physician:  Neil Haines MD MD Orders:  PT Eval and Treat   Return MD Appt:  2024   Date of Onset:  Onset Date: 24     Allergies:   Crab extract, Cephalexin, Morphine, Morphine, Oxycodone-acetaminophen, Zolpidem, Adhesive tape, and Cephalexin  Restrictions/Precautions:   History of Right TKA, Anxiety, Arthritis, Asthma, Type 2 Diabetes, High Blood Pressure, Difficulty Sleeping, Frequent Falls, High Cholesterol, Incontinence, Left Wrist Fracture      Interventions Planned (Treatment may consist of any combination of the following):     See Assessment Note    Subjective Comments:   Patient reports that she sore in her right shoulder from using a cane and from other things.  Initial Pain Level::     4/10  Post Session Pain Level:

## 2024-12-16 ENCOUNTER — APPOINTMENT (OUTPATIENT)
Dept: PHYSICAL THERAPY | Age: 64
End: 2024-12-16
Payer: MEDICARE

## 2024-12-30 ENCOUNTER — HOSPITAL ENCOUNTER (OUTPATIENT)
Dept: PHYSICAL THERAPY | Age: 64
Setting detail: RECURRING SERIES
Discharge: HOME | End: 2025-01-02
Payer: MEDICARE

## 2024-12-30 PROCEDURE — 97140 MANUAL THERAPY 1/> REGIONS: CPT

## 2024-12-30 PROCEDURE — 97110 THERAPEUTIC EXERCISES: CPT

## 2024-12-30 ASSESSMENT — PAIN SCALES - GENERAL: PAINLEVEL_OUTOF10: 4

## 2024-12-30 NOTE — PROGRESS NOTES
Karla Hart  : 1960  Primary: Medicare Part A And B (Medicare)  Secondary: [facility] [place]  Amol ELI A  [facility] [address]  Phone: [phone]  Fax: [phone] Plan Frequency: 2 times per week for 60 days  Plan of Care/Certification Expiration Date: 25 (Plan of Care Start Date 2024)        Plan of Care/Certification Expiration Date:  Plan of Care/Certification Expiration Date: 25 (Plan of Care Start Date 2024)    Frequency/Duration: Plan Frequency: 2 times per week for 60 days      Time In/Out:   Time In: 1101  Time Out: 1159      PT Visit Info:    Progress Note Counter: 5      Visit Count:  5    OUTPATIENT PHYSICAL THERAPY:   Treatment Note 2024       Episode  (Lumbar Radiculopathy)               Treatment Diagnosis:    Radiculopathy, lumbar region  Muscle weakness (generalized)  Difficulty in walking, not elsewhere classified  Medical/Referring Diagnosis:    Lumbar radiculopathy  Degeneration of intervertebral disc of lumbar region with lower extremity pain  Facet hypertrophy of lumbar region  Neuroforaminal stenosis of lumbar spine  Pain management  Referring Physician:  Neil Haines MD MD Orders:  PT Eval and Treat   Return MD Appt:  2024   Date of Onset:  Onset Date: 24     Allergies:   Crab extract, Cephalexin, Morphine, Morphine, Oxycodone-acetaminophen, Zolpidem, Adhesive tape, and Cephalexin  Restrictions/Precautions:   History of Right TKA, Anxiety, Arthritis, Asthma, Type 2 Diabetes, High Blood Pressure, Difficulty Sleeping, Frequent Falls, High Cholesterol, Incontinence, Left Wrist Fracture      Interventions Planned (Treatment may consist of any combination of the following):     See Assessment Note    Subjective Comments:   Patient reports that she continues to have a lot of back pain. She reports that she forgot her cane today and this is not helping.  Initial Pain Level::

## 2025-01-02 ENCOUNTER — HOSPITAL ENCOUNTER (OUTPATIENT)
Dept: PHYSICAL THERAPY | Age: 65
Setting detail: RECURRING SERIES
Discharge: HOME | End: 2025-01-05
Payer: MEDICARE

## 2025-01-02 PROCEDURE — 97110 THERAPEUTIC EXERCISES: CPT

## 2025-01-02 PROCEDURE — 97140 MANUAL THERAPY 1/> REGIONS: CPT

## 2025-01-02 ASSESSMENT — PAIN SCALES - GENERAL: PAINLEVEL_OUTOF10: 6

## 2025-01-02 NOTE — TELEPHONE ENCOUNTER
MEDICATION REFILL REQUEST          Name of Medication:  nitroGLYCERIN (NITROSTAT)   Dose:  0.4 MG SL tablet  Frequency:   Place 1 tablet under the tongue every 5 minutes as needed for Chest pain up to max of 3 total doses. If no relief after 1 dose, call 911.  Quantity:  na  Days' supply:  na          Pharmacy Name/Location:  [facility]

## 2025-01-02 NOTE — PROGRESS NOTES
Karla Hart  : 1960  Primary: Medicare Part A And B (Medicare)  Secondary: [facility] [place]  Amol ELI A  [facility] [address]  Phone: [phone]  Fax: [phone] Plan Frequency: 2 times per week for 60 days  Plan of Care/Certification Expiration Date: 25 (Plan of Care Start Date 2024)        Plan of Care/Certification Expiration Date:  Plan of Care/Certification Expiration Date: 25 (Plan of Care Start Date 2024)    Frequency/Duration: Plan Frequency: 2 times per week for 60 days      Time In/Out:   Time In: 1055  Time Out: 1157      PT Visit Info:    Progress Note Counter: 6      Visit Count:  6    OUTPATIENT PHYSICAL THERAPY:   Treatment Note 2025       Episode  (Lumbar Radiculopathy)               Treatment Diagnosis:    Radiculopathy, lumbar region  Muscle weakness (generalized)  Difficulty in walking, not elsewhere classified  Medical/Referring Diagnosis:    Lumbar radiculopathy  Degeneration of intervertebral disc of lumbar region with lower extremity pain  Facet hypertrophy of lumbar region  Neuroforaminal stenosis of lumbar spine  Pain management  Referring Physician:  Neil Haines MD MD Orders:  PT Eval and Treat   Return MD Appt:  2024   Date of Onset:  Onset Date: 24     Allergies:   Crab extract, Cephalexin, Morphine, Morphine, Oxycodone-acetaminophen, Zolpidem, Adhesive tape, and Cephalexin  Restrictions/Precautions:   History of Right TKA, Anxiety, Arthritis, Asthma, Type 2 Diabetes, High Blood Pressure, Difficulty Sleeping, Frequent Falls, High Cholesterol, Incontinence, Left Wrist Fracture      Interventions Planned (Treatment may consist of any combination of the following):     See Assessment Note    Subjective Comments:   Patient reports that her right knee is hurting. She reports back is hurting, but better since last session.  Initial Pain Level::     6/10  Post Session

## 2025-01-02 NOTE — TELEPHONE ENCOUNTER
Requested Prescriptions     Pending Prescriptions Disp Refills    nitroGLYCERIN (NITROSTAT) 0.4 MG SL tablet 25 tablet 3     Sig: Place 1 tablet under the tongue every 5 minutes as needed for Chest pain up to max of 3 total doses. If no relief after 1 dose, call 911.        Verified rx. Last OV 8/7/24. Erx to pharm on file.

## 2025-01-06 ENCOUNTER — HOSPITAL ENCOUNTER (OUTPATIENT)
Dept: PHYSICAL THERAPY | Age: 65
Setting detail: RECURRING SERIES
Discharge: HOME | End: 2025-01-09
Payer: MEDICARE

## 2025-01-06 PROCEDURE — 97140 MANUAL THERAPY 1/> REGIONS: CPT

## 2025-01-06 PROCEDURE — 97110 THERAPEUTIC EXERCISES: CPT

## 2025-01-06 ASSESSMENT — PAIN SCALES - GENERAL: PAINLEVEL_OUTOF10: 6

## 2025-01-06 NOTE — PROGRESS NOTES
Karla Hart  : 1960  Primary: Medicare Part A And B (Medicare)  Secondary: [facility] [place]  Amol ELI A  [facility] [address]  Phone: [phone]  Fax: [phone] Plan Frequency: 2 times per week for 60 days  Plan of Care/Certification Expiration Date: 25 (Plan of Care Start Date 2024)        Plan of Care/Certification Expiration Date:  Plan of Care/Certification Expiration Date: 25 (Plan of Care Start Date 2024)    Frequency/Duration: Plan Frequency: 2 times per week for 60 days      Time In/Out:   Time In: 1055  Time Out: 1155      PT Visit Info:    Progress Note Counter: 7      Visit Count:  7    OUTPATIENT PHYSICAL THERAPY:   Treatment Note 2025       Episode  (Lumbar Radiculopathy)               Treatment Diagnosis:    Radiculopathy, lumbar region  Muscle weakness (generalized)  Difficulty in walking, not elsewhere classified  Medical/Referring Diagnosis:    Lumbar radiculopathy  Degeneration of intervertebral disc of lumbar region with lower extremity pain  Facet hypertrophy of lumbar region  Neuroforaminal stenosis of lumbar spine  Pain management  Referring Physician:  Neil Haines MD MD Orders:  PT Eval and Treat   Return MD Appt:  2024   Date of Onset:  Onset Date: 24     Allergies:   Crab extract, Cephalexin, Morphine, Morphine, Oxycodone-acetaminophen, Zolpidem, Adhesive tape, and Cephalexin  Restrictions/Precautions:   History of Right TKA, Anxiety, Arthritis, Asthma, Type 2 Diabetes, High Blood Pressure, Difficulty Sleeping, Frequent Falls, High Cholesterol, Incontinence, Left Wrist Fracture      Interventions Planned (Treatment may consist of any combination of the following):     See Assessment Note    Subjective Comments:   Patient reports that she was busy this weekend and did not do a lot, but her right hamstring feels distally very tight. She reports her back is also

## 2025-01-06 NOTE — TELEPHONE ENCOUNTER
Requested Prescriptions     Pending Prescriptions Disp Refills    nitroGLYCERIN (NITROSTAT) 0.4 MG SL tablet 25 tablet 3     Sig: Place 1 tablet under the tongue every 5 minutes as needed for Chest pain up to max of 3 total doses. If no relief after 1 dose, call 911.

## 2025-01-07 RX ORDER — NITROGLYCERIN 0.4 MG/1
0.4 TABLET SUBLINGUAL EVERY 5 MIN PRN
Qty: 25 TABLET | Refills: 3 | Status: SHIPPED | OUTPATIENT
Start: 2025-01-07 | End: 2025-01-10 | Stop reason: SDUPTHER

## 2025-01-08 ENCOUNTER — HOSPITAL ENCOUNTER (OUTPATIENT)
Dept: PHYSICAL THERAPY | Age: 65
Setting detail: RECURRING SERIES
Discharge: HOME | End: 2025-01-11
Payer: MEDICARE

## 2025-01-08 PROCEDURE — 97140 MANUAL THERAPY 1/> REGIONS: CPT

## 2025-01-08 PROCEDURE — 97110 THERAPEUTIC EXERCISES: CPT

## 2025-01-08 ASSESSMENT — PAIN SCALES - GENERAL: PAINLEVEL_OUTOF10: 6

## 2025-01-08 NOTE — PROGRESS NOTES
Karla Hart  : 1960  Primary: Medicare Part A And B (Medicare)  Secondary: [facility] [place]  Amol ELI A  [facility] [address]  Phone: [phone]  Fax: [phone] Plan Frequency: 2 times per week for 60 days  Plan of Care/Certification Expiration Date: 25 (Plan of Care Start Date 2024)        Plan of Care/Certification Expiration Date:  Plan of Care/Certification Expiration Date: 25 (Plan of Care Start Date 2024)    Frequency/Duration: Plan Frequency: 2 times per week for 60 days      Time In/Out:   Time In: 745  Time Out: 843      PT Visit Info:    Progress Note Counter: 8      Visit Count:  8    OUTPATIENT PHYSICAL THERAPY:   Treatment Note 2025       Episode  (Lumbar Radiculopathy)               Treatment Diagnosis:    Radiculopathy, lumbar region  Muscle weakness (generalized)  Difficulty in walking, not elsewhere classified  Medical/Referring Diagnosis:    Lumbar radiculopathy  Degeneration of intervertebral disc of lumbar region with lower extremity pain  Facet hypertrophy of lumbar region  Neuroforaminal stenosis of lumbar spine  Pain management  Referring Physician:  Neil Haines MD MD Orders:  PT Eval and Treat   Return MD Appt:  2024   Date of Onset:  Onset Date: 24     Allergies:   Crab extract, Cephalexin, Morphine, Morphine, Oxycodone-acetaminophen, Zolpidem, Adhesive tape, and Cephalexin  Restrictions/Precautions:   History of Right TKA, Anxiety, Arthritis, Asthma, Type 2 Diabetes, High Blood Pressure, Difficulty Sleeping, Frequent Falls, High Cholesterol, Incontinence, Left Wrist Fracture      Interventions Planned (Treatment may consist of any combination of the following):     See Assessment Note    Subjective Comments:   Patient reports that less pain her legs since last visit.  Initial Pain Level::     6/10  Post Session Pain Level:       /10  Medications Last Reviewed:

## 2025-01-10 NOTE — TELEPHONE ENCOUNTER
Last office visit 8/7/2024.    Requested Prescriptions     Pending Prescriptions Disp Refills    nitroGLYCERIN (NITROSTAT) 0.4 MG SL tablet 25 tablet 3     Sig: Place 1 tablet under the tongue every 5 minutes as needed for Chest pain up to max of 3 total doses. If no relief after 1 dose, call 911.

## 2025-01-10 NOTE — TELEPHONE ENCOUNTER
MEDICATION REFILL REQUEST          Name of Medication:   nitroGLYCERIN (NITROSTAT  Dose:  0.4 MG SL tablet  Frequency:   Place 1 tablet under the tongue every 5 minutes as needed for Chest pain up to max of 3 total doses. If no relief after 1 dose, call 911.  Quantity:  30  Days' supply:  90          Pharmacy Name/Location:  [facility]

## 2025-01-11 RX ORDER — NITROGLYCERIN 0.4 MG/1
0.4 TABLET SUBLINGUAL EVERY 5 MIN PRN
Qty: 25 TABLET | Refills: 3 | Status: SHIPPED | OUTPATIENT
Start: 2025-01-11

## 2025-01-13 ENCOUNTER — HOSPITAL ENCOUNTER (OUTPATIENT)
Dept: PHYSICAL THERAPY | Age: 65
Setting detail: RECURRING SERIES
Discharge: HOME | End: 2025-01-16
Payer: MEDICARE

## 2025-01-13 PROCEDURE — 97140 MANUAL THERAPY 1/> REGIONS: CPT

## 2025-01-13 PROCEDURE — 97110 THERAPEUTIC EXERCISES: CPT

## 2025-01-13 ASSESSMENT — PAIN SCALES - GENERAL: PAINLEVEL_OUTOF10: 5

## 2025-01-13 NOTE — PROGRESS NOTES
Reviewed:  1/13/2025  Updated Objective Findings:  See Progress Note from today   Treatment   THERAPEUTIC EXERCISE: (23 minutes):    Exercises per grid below to improve mobility, strength, balance, and coordination.  Required moderate visual, verbal, manual, and tactile cues to promote proper body alignment, promote proper body posture, promote proper body mechanics, and promote proper body breathing techniques.  Progressed resistance, range, repetitions, and complexity of movement as indicated.   Date:  1/6/2025 Date:  1/8/2025 Date:  1/13/2025   Activity/Exercise Parameters Parameters Parameters   Pelvic Tilt 3 x 15 Hooklying 3 x 15 Hooklying 3 x 15 Hooklying   SKTC Hooklying 3 x 10  Hooklying 3 x 10   DKTC      Trunk Rotation 3 x 10 Supine Knees Bent 3 x 10 Supine Knees Bent 3 x 10 Supine Knees Bent   Clams   3 x 20 Red Hooklying   Knee Fall Out   3 x 10 Red Hooklying   March      Calf Stretch 3 x 1 min Slant Board 3 x 1 min Slant Board 3 x 1 min Slant Board   Adductor Stretch  8 minutes Therapist Assist     Hamstring Stretch  8 minutes Therapist Assist    Trunk Flexion      TA Brace 10 sec x 20 Hooklying  10 sec x 20 Hooklying   Anti Rotation       Sit to Stand 3 x 5 from Mat Table     Walking Upright Neutral Posture 2 x 160 feet with Cane Heel to Toe Pattern       Time spent with patient reviewing proper muscle recruitment and technique with exercises.     MANUAL THERAPY: (30 minutes):   Joint mobilization and Soft tissue mobilization was utilized and necessary because of the patient's restricted joint motion, painful spasm, loss of articular motion, and restricted motion of soft tissue.   Soft Tissue Mobilization -  Thoracic and Lumbar Paraspinals, QL, Gluteals, Distal Right Hamstring.    MODALITIES: (0 minutes):        None Today      HEP: As above; handouts given to patient for all exercises.     Treatment/Session Summary:    Treatment Assessment:   Patient demonstrated  improved ROM, mobility, flexibility, 
Mobility, Endurance Training, Functional Mobility Training, Gait Training, Home Exercise Program (HEP), Manual Therapy, Neuromuscular Re-education/Strengthening, Pain Management, Positioning, Modalities:,   Heat/Cold,   Mechanical Traction,   E-stim - unattended, and   Vasopneumatic Device, Range of Motion (ROM), Return to Work-Related Activiity, Stair Training, Therapeutic Activites, Therapeutic Exercise/Strengthening, Transfer Training, Equipment Evaluation, Education, and Procurement, Patient/Caregiver Education & Training, Safety Education and Training, ADL/Self-Care Training, IADL Training, and Dry Needling   Short-Term Functional Goals: Time Frame: 1/13/2025 to 12/25/2024  Patient demonstrates independence with home exercise program without verbal cueing provided by therapist. -ONGOING  Patient will report no more than 2/10 low back pain at rest in order to demonstrate improved self pain control and tolerance. -ONGOING  Patient will be educated in and demonstrate improved upright posture including sitting, standing, and walking to assist with improved safety and performance with all daily activities.-ONGOING  Patient will be educated in and demonstrate proper squat lift technique in order to reduce stress on lumbosacral spine, improve safety, and reduce risk of injury.-ONGOING  Patient will improve lumbar flexion to 30 degrees to decrease pain and difficulty with dressing and bathing. -MET  Discharge Goals: Time Frame: 1/13/2025 to 1/25/2025  Patient will improve lumbar flexion to 40 degrees to assist with bathing, reaching, and lifting. -MET  Patient will improve strength to 4/5 to assist with ability to ambulate with least restrictive assistive device safely. -ONGOING  Patient will improve strength to be able to stand and walk for at least 30 minutes or more to assist with all activities around her home and to assist with grocery shopping.-ONGOING  Patient will improve single leg balance to 8 seconds to

## 2025-01-14 ENCOUNTER — OFFICE VISIT (OUTPATIENT)
Age: 65
End: 2025-01-14
Payer: MEDICARE

## 2025-01-14 VITALS
SYSTOLIC BLOOD PRESSURE: 126 MMHG | DIASTOLIC BLOOD PRESSURE: 74 MMHG | WEIGHT: 211 LBS | HEIGHT: 66 IN | BODY MASS INDEX: 33.91 KG/M2 | HEART RATE: 71 BPM

## 2025-01-14 DIAGNOSIS — R00.0 TACHYCARDIA: ICD-10-CM

## 2025-01-14 DIAGNOSIS — I10 PRIMARY HYPERTENSION: ICD-10-CM

## 2025-01-14 DIAGNOSIS — Z01.810 PREOP CARDIOVASCULAR EXAM: Primary | ICD-10-CM

## 2025-01-14 PROCEDURE — 99214 OFFICE O/P EST MOD 30 MIN: CPT | Performed by: INTERNAL MEDICINE

## 2025-01-14 PROCEDURE — 1036F TOBACCO NON-USER: CPT | Performed by: INTERNAL MEDICINE

## 2025-01-14 PROCEDURE — G8417 CALC BMI ABV UP PARAM F/U: HCPCS | Performed by: INTERNAL MEDICINE

## 2025-01-14 PROCEDURE — 3078F DIAST BP <80 MM HG: CPT | Performed by: INTERNAL MEDICINE

## 2025-01-14 PROCEDURE — 3017F COLORECTAL CA SCREEN DOC REV: CPT | Performed by: INTERNAL MEDICINE

## 2025-01-14 PROCEDURE — G8427 DOCREV CUR MEDS BY ELIG CLIN: HCPCS | Performed by: INTERNAL MEDICINE

## 2025-01-14 PROCEDURE — 3074F SYST BP LT 130 MM HG: CPT | Performed by: INTERNAL MEDICINE

## 2025-01-14 PROCEDURE — 93000 ELECTROCARDIOGRAM COMPLETE: CPT | Performed by: INTERNAL MEDICINE

## 2025-01-14 NOTE — PROGRESS NOTES
\"CHOLPOCT\", \"CHLST\", \"CHOLV\", \"HDL\", \"HDLPOC\", \"HDLC\", \"LDL\", \"LDLC\", \"VLDLC\", \"VLDL\"    No results found for any visits on 01/14/25.        PLAN  Karla \"Mona\" was seen today for cardiac clearance and follow-up.    Diagnoses and all orders for this visit:    Preop cardiovascular exam  -     EKG 12 Lead    Primary hypertension    Tachycardia      Return if symptoms worsen or fail to improve.       Enrico Lewis MD  1/14/2025  2:37 PM      The patient (or guardian, if applicable) and other individuals in attendance with the patient were advised that Artificial Intelligence will be utilized during this visit to record, process the conversation to generate a clinical note, and support improvement of the AI technology. The patient (or guardian, if applicable) and other individuals in attendance at the appointment consented to the use of AI, including the recording.

## 2025-01-15 ENCOUNTER — OFFICE VISIT (OUTPATIENT)
Dept: NEUROSURGERY | Age: 65
End: 2025-01-15
Payer: MEDICARE

## 2025-01-15 VITALS
HEART RATE: 81 BPM | DIASTOLIC BLOOD PRESSURE: 93 MMHG | HEIGHT: 66 IN | WEIGHT: 211 LBS | TEMPERATURE: 98.4 F | OXYGEN SATURATION: 98 % | BODY MASS INDEX: 33.91 KG/M2 | SYSTOLIC BLOOD PRESSURE: 145 MMHG

## 2025-01-15 DIAGNOSIS — M54.16 LUMBAR RADICULOPATHY: Primary | ICD-10-CM

## 2025-01-15 DIAGNOSIS — M48.061 NEUROFORAMINAL STENOSIS OF LUMBAR SPINE: ICD-10-CM

## 2025-01-15 DIAGNOSIS — M51.361 DEGENERATION OF INTERVERTEBRAL DISC OF LUMBAR REGION WITH LOWER EXTREMITY PAIN: ICD-10-CM

## 2025-01-15 DIAGNOSIS — M47.816 FACET HYPERTROPHY OF LUMBAR REGION: ICD-10-CM

## 2025-01-15 PROCEDURE — 3077F SYST BP >= 140 MM HG: CPT | Performed by: NEUROLOGICAL SURGERY

## 2025-01-15 PROCEDURE — G8417 CALC BMI ABV UP PARAM F/U: HCPCS | Performed by: NEUROLOGICAL SURGERY

## 2025-01-15 PROCEDURE — 3017F COLORECTAL CA SCREEN DOC REV: CPT | Performed by: NEUROLOGICAL SURGERY

## 2025-01-15 PROCEDURE — G8427 DOCREV CUR MEDS BY ELIG CLIN: HCPCS | Performed by: NEUROLOGICAL SURGERY

## 2025-01-15 PROCEDURE — 3080F DIAST BP >= 90 MM HG: CPT | Performed by: NEUROLOGICAL SURGERY

## 2025-01-15 PROCEDURE — 99214 OFFICE O/P EST MOD 30 MIN: CPT | Performed by: NEUROLOGICAL SURGERY

## 2025-01-15 PROCEDURE — 1036F TOBACCO NON-USER: CPT | Performed by: NEUROLOGICAL SURGERY

## 2025-01-15 NOTE — PROGRESS NOTES
[facility] NOTE:   History of Present Illness:    CC: Follow-up evaluation of low back pain and right lower extremity pain    Karla Hart is a 64 y.o. female who presents today for follow-up evaluation of low back and right lower extremity pain.  The patient has had 5 to 6 years worth of low back pain and right lower extremity pain.  She endorses pain in a classic L5 distribution.  She returns today to discuss the findings of an EMG/NCV.  The EMG/NCV demonstrates evidence of a right S1 radiculopathy with acute denervation distally.  She receives low back injections performed by Dr. Barnes at [facility].  She underwent a right knee replacement performed by Dr. Kirk Garcia on 4/3/2023.  She is scheduled to undergo a left knee replacement on 1/24/2025. The patient has a lumbar spine MRI without contrast performed at [facility] on 5/13/2024 that demonstrates degenerative disc disease at most levels of the lumbar spine most significantly noted with complete disc height collapse at L1-L2 and L2-L3 and L3-L4.  At L2-L3 there is slight retrolisthesis of L2 in relation to L3 at L3-L4 on the right there is facet hypertrophy that results in some mild to moderate lateral recess stenosis at L4-L5 mild lateral recess stenosis secondary to bilateral facet hypertrophy.  At L5-S1 there is increased T2 signal in the facet joints consistent with facet arthropathy there is no significant central spinal stenosis.  At L2-L3 some mild right neuroforaminal stenosis L3-L4 and at L3-L4 there is right greater than left neuroforaminal stenosis.  At L5-S1 there is right-sided neuroforaminal stenosis.  There is no significant compression of the S1 nerve root.  The patient rates her pain as a 6 out of 10 on a visual analog pain scale and states that it is tolerable and she is used to it.  She is here today to determine whether it would be reasonable for her to proceed with her left knee replacement.

## 2025-01-16 ENCOUNTER — HOSPITAL ENCOUNTER (OUTPATIENT)
Dept: PHYSICAL THERAPY | Age: 65
Setting detail: RECURRING SERIES
Discharge: HOME | End: 2025-01-19
Payer: MEDICARE

## 2025-01-16 PROCEDURE — 97110 THERAPEUTIC EXERCISES: CPT

## 2025-01-16 PROCEDURE — 97140 MANUAL THERAPY 1/> REGIONS: CPT

## 2025-01-16 ASSESSMENT — PAIN SCALES - GENERAL: PAINLEVEL_OUTOF10: 5

## 2025-01-16 NOTE — PROGRESS NOTES
Session Pain Level:       3/10  Medications Last Reviewed:  1/16/2025  Updated Objective Findings:   Palpation: Left Knee Generalized Tenderness.    Treatment   THERAPEUTIC EXERCISE: (23 minutes):    Exercises per grid below to improve mobility, strength, balance, and coordination.  Required moderate visual, verbal, manual, and tactile cues to promote proper body alignment, promote proper body posture, promote proper body mechanics, and promote proper body breathing techniques.  Progressed resistance, range, repetitions, and complexity of movement as indicated.   Date:  1/16/2025 Date:  1/8/2025 Date:  1/13/2025   Activity/Exercise Parameters Parameters Parameters   Pelvic Tilt 3 x 15 Hooklying 3 x 15 Hooklying 3 x 15 Hooklying   SKTC Hooklying 3 x 10  Hooklying 3 x 10   DKTC      Trunk Rotation 3 x 10 Supine Knees Bent 3 x 10 Supine Knees Bent 3 x 10 Supine Knees Bent   Clams   3 x 20 Red Hooklying   Knee Fall Out   3 x 10 Red Hooklying   March 3 x 10 Green     Calf Stretch 3 x 1 min Slant Board 3 x 1 min Slant Board 3 x 1 min Slant Board   Adductor Stretch  8 minutes Therapist Assist     Hamstring Stretch  8 minutes Therapist Assist    Trunk Flexion      TA Brace 10 sec x 20 Hooklying  10 sec x 20 Hooklying   Anti Rotation       Sit to Stand      Walking        Time spent with patient reviewing proper muscle recruitment and technique with exercises.     MANUAL THERAPY: (30 minutes):   Joint mobilization and Soft tissue mobilization was utilized and necessary because of the patient's restricted joint motion, painful spasm, loss of articular motion, and restricted motion of soft tissue.   Soft Tissue Mobilization -  Thoracic and Lumbar Paraspinals, QL, Gluteals, Distal Right Hamstring.    MODALITIES: (0 minutes):        None Today      HEP: As above; handouts given to patient for all exercises.     Treatment/Session Summary:    Treatment Assessment:   Patient demonstrated  improved ROM, mobility, flexibility, and

## 2025-01-16 NOTE — THERAPY DISCHARGE
Training, Bed Mobility, Endurance Training, Functional Mobility Training, Gait Training, Home Exercise Program (HEP), Manual Therapy, Neuromuscular Re-education/Strengthening, Pain Management, Positioning, Modalities:,   Heat/Cold,   Mechanical Traction,   E-stim - unattended, and   Vasopneumatic Device, Range of Motion (ROM), Return to Work-Related Activiity, Stair Training, Therapeutic Activites, Therapeutic Exercise/Strengthening, Transfer Training, Equipment Evaluation, Education, and Procurement, Patient/Caregiver Education & Training, Safety Education and Training, ADL/Self-Care Training, IADL Training, and Dry Needling   Short-Term Functional Goals: Time Frame: 1/16/2025 to 12/25/2024  Patient demonstrates independence with home exercise program without verbal cueing provided by therapist. -ONGOING  Patient will report no more than 2/10 low back pain at rest in order to demonstrate improved self pain control and tolerance. -ONGOING  Patient will be educated in and demonstrate improved upright posture including sitting, standing, and walking to assist with improved safety and performance with all daily activities.-ONGOING  Patient will be educated in and demonstrate proper squat lift technique in order to reduce stress on lumbosacral spine, improve safety, and reduce risk of injury.-ONGOING  Patient will improve lumbar flexion to 30 degrees to decrease pain and difficulty with dressing and bathing. -MET  Discharge Goals: Time Frame: 1/16/2025 to 1/25/2025  Patient will improve lumbar flexion to 40 degrees to assist with bathing, reaching, and lifting. -MET  Patient will improve strength to 4/5 to assist with ability to ambulate with least restrictive assistive device safely. -ONGOING  Patient will improve strength to be able to stand and walk for at least 30 minutes or more to assist with all activities around her home and to assist with grocery shopping.-ONGOING  Patient will improve single leg balance to 8

## 2025-01-28 DIAGNOSIS — I20.0 ACCELERATING ANGINA (HCC): ICD-10-CM

## 2025-01-28 DIAGNOSIS — I10 HYPERTENSION: ICD-10-CM

## 2025-01-28 DIAGNOSIS — I48.91 ATRIAL FIBRILLATION (HCC): ICD-10-CM

## 2025-01-28 RX ORDER — IVABRADINE 5 MG/1
5 TABLET, FILM COATED ORAL 2 TIMES DAILY WITH MEALS
Qty: 7 TABLET | Refills: 0 | Status: SHIPPED | OUTPATIENT
Start: 2025-01-28

## 2025-01-28 NOTE — TELEPHONE ENCOUNTER
Requested Prescriptions     Pending Prescriptions Disp Refills    ivabradine (CORLANOR) 5 MG TABS tablet 7 tablet 0     Sig: Take 1 tablet by mouth 2 times daily (with meals)

## 2025-01-28 NOTE — TELEPHONE ENCOUNTER
MEDICATION REFILL REQUEST      Name of Medication:  Ivabradine   Dose:    Frequency:    Quantity:    Days' supply:  Need for 4 days      Pharmacy Name/Location:  [facility] / Please call in today / son accidentally took patient's medication's home when patient left hospital and will be back this weekend

## 2025-01-31 NOTE — PROGRESS NOTES
[facility]  [address]  [place] [address]  [phone]    Patient Name:  Karla Hart  YOB: 1960      Office Visit 2/3/2025    CHIEF COMPLAINT:    Chief Complaint   Patient presents with    Follow-up    New to Provider         HISTORY OF PRESENT ILLNESS:  Patient is an 64 y.o. female seen today for follow up of SARI. Pt had a PSG/HST on 6/12/18 with an AHI of 10.7/hr with desaturations to 75%. Pt is on CPAP 4-20 cm H2O.   Pt is accompanied by her daughter-in-law. Pt reports that she has been on CPAP for a long time. She had a family friend who is a NP that ordered the sleep study. She was started on CPAP and had been followed very loosely. Her NP friend has moved around and she has not had consistent follow up. Her supplies prescription has run out. Pt has been ordering her supplies from [employer]. She needs new filters for her machine. Her machine is about 7 years old and she needs a new one.   She has been using an F20 airtouch mask. She likes this mask and tolerates it well. She dose report that she had L TKA on 1/24. Pt was hospitalized for 4 days postoperatively. She did bring her CPAP to the hospital and she has been wearing it. She was woken up most ever night with nocturnal hypoxemia into the mid 80s while on her CPAP. She is not on O2 at home.   She also reports that when she sees Dr. Lewis, her cardiologist, her sats will drop and her HR will go up. He has been concerned that she might need nocturnal O2. We discussed checking an MALA which Jimbo did order at her last visit 8/2024 but it does not appear to have been completed. Pt is agreeable. If her sats are low, she is aware that she will  need a titration study to determine need for O2.   Pt normally sleeps well but has not been due to knee pain. She also had an accidental injury to her L knee over the weekend when her dad was driving her home. She was sitting behind him and he opened his door and

## 2025-02-03 ENCOUNTER — OFFICE VISIT (OUTPATIENT)
Dept: SLEEP MEDICINE | Age: 65
End: 2025-02-03
Payer: MEDICARE

## 2025-02-03 VITALS
HEIGHT: 67 IN | OXYGEN SATURATION: 96 % | WEIGHT: 207 LBS | BODY MASS INDEX: 32.49 KG/M2 | SYSTOLIC BLOOD PRESSURE: 123 MMHG | HEART RATE: 95 BPM | RESPIRATION RATE: 19 BRPM | DIASTOLIC BLOOD PRESSURE: 87 MMHG | TEMPERATURE: 97.4 F

## 2025-02-03 DIAGNOSIS — G47.33 OSA (OBSTRUCTIVE SLEEP APNEA): Primary | ICD-10-CM

## 2025-02-03 DIAGNOSIS — G47.34 NOCTURNAL HYPOXEMIA: ICD-10-CM

## 2025-02-03 PROCEDURE — 3074F SYST BP LT 130 MM HG: CPT | Performed by: PHYSICIAN ASSISTANT

## 2025-02-03 PROCEDURE — 3017F COLORECTAL CA SCREEN DOC REV: CPT | Performed by: PHYSICIAN ASSISTANT

## 2025-02-03 PROCEDURE — 99215 OFFICE O/P EST HI 40 MIN: CPT | Performed by: PHYSICIAN ASSISTANT

## 2025-02-03 PROCEDURE — G8427 DOCREV CUR MEDS BY ELIG CLIN: HCPCS | Performed by: PHYSICIAN ASSISTANT

## 2025-02-03 PROCEDURE — 3079F DIAST BP 80-89 MM HG: CPT | Performed by: PHYSICIAN ASSISTANT

## 2025-02-03 PROCEDURE — 1036F TOBACCO NON-USER: CPT | Performed by: PHYSICIAN ASSISTANT

## 2025-02-03 PROCEDURE — G8417 CALC BMI ABV UP PARAM F/U: HCPCS | Performed by: PHYSICIAN ASSISTANT

## 2025-02-03 RX ORDER — LISINOPRIL 5 MG/1
5 TABLET ORAL DAILY
COMMUNITY
Start: 2024-11-14

## 2025-02-03 ASSESSMENT — SLEEP AND FATIGUE QUESTIONNAIRES
HOW LIKELY ARE YOU TO NOD OFF OR FALL ASLEEP WHEN YOU ARE A PASSENGER IN A CAR FOR AN HOUR WITHOUT A BREAK: SLIGHT CHANCE OF DOZING
ESS TOTAL SCORE: 10
HOW LIKELY ARE YOU TO NOD OFF OR FALL ASLEEP WHILE SITTING INACTIVE IN A PUBLIC PLACE: MODERATE CHANCE OF DOZING
HOW LIKELY ARE YOU TO NOD OFF OR FALL ASLEEP WHILE WATCHING TV: WOULD NEVER DOZE
HOW LIKELY ARE YOU TO NOD OFF OR FALL ASLEEP WHILE LYING DOWN TO REST IN THE AFTERNOON WHEN CIRCUMSTANCES PERMIT: HIGH CHANCE OF DOZING
HOW LIKELY ARE YOU TO NOD OFF OR FALL ASLEEP WHILE SITTING AND READING: SLIGHT CHANCE OF DOZING
HOW LIKELY ARE YOU TO NOD OFF OR FALL ASLEEP WHILE SITTING AND TALKING TO SOMEONE: WOULD NEVER DOZE
HOW LIKELY ARE YOU TO NOD OFF OR FALL ASLEEP IN A CAR, WHILE STOPPED FOR A FEW MINUTES IN TRAFFIC: WOULD NEVER DOZE
HOW LIKELY ARE YOU TO NOD OFF OR FALL ASLEEP WHILE SITTING QUIETLY AFTER LUNCH WITHOUT ALCOHOL: HIGH CHANCE OF DOZING

## 2025-02-03 NOTE — PATIENT INSTRUCTIONS
The company who will be taking care of your CPAP supplies is:    Address: [address], [place] [address]  Phone: [phone]   Fax: [phone]

## 2025-02-11 ENCOUNTER — TRANSCRIBE ORDERS (OUTPATIENT)
Dept: SCHEDULING | Age: 65
End: 2025-02-11

## 2025-02-11 DIAGNOSIS — I82.4Y2 ACUTE DEEP VEIN THROMBOSIS (DVT) OF PROXIMAL VEIN OF LEFT LOWER EXTREMITY (HCC): Primary | ICD-10-CM

## 2025-02-12 ENCOUNTER — HOSPITAL ENCOUNTER (OUTPATIENT)
Dept: ULTRASOUND IMAGING | Age: 65
Discharge: HOME | End: 2025-02-14
Payer: MEDICARE

## 2025-02-12 ENCOUNTER — APPOINTMENT (OUTPATIENT)
Dept: GENERAL RADIOLOGY | Age: 65
End: 2025-02-12
Payer: MEDICARE

## 2025-02-12 ENCOUNTER — TRANSCRIBE ORDERS (OUTPATIENT)
Dept: SCHEDULING | Age: 65
End: 2025-02-12

## 2025-02-12 ENCOUNTER — HOSPITAL ENCOUNTER (EMERGENCY)
Age: 65
Discharge: HOME | End: 2025-02-12
Attending: EMERGENCY MEDICINE
Payer: MEDICARE

## 2025-02-12 VITALS
BODY MASS INDEX: 32.14 KG/M2 | HEIGHT: 66 IN | TEMPERATURE: 98.1 F | SYSTOLIC BLOOD PRESSURE: 113 MMHG | WEIGHT: 200 LBS | DIASTOLIC BLOOD PRESSURE: 72 MMHG | HEART RATE: 92 BPM | OXYGEN SATURATION: 98 % | RESPIRATION RATE: 16 BRPM

## 2025-02-12 DIAGNOSIS — M79.10 MYALGIA: ICD-10-CM

## 2025-02-12 DIAGNOSIS — M79.605 LEFT LEG PAIN: Primary | ICD-10-CM

## 2025-02-12 DIAGNOSIS — I82.402 ACUTE THROMBOEMBOLISM OF DEEP VEINS OF LEFT LOWER EXTREMITY (HCC): ICD-10-CM

## 2025-02-12 DIAGNOSIS — I82.402 ACUTE THROMBOEMBOLISM OF DEEP VEINS OF LEFT LOWER EXTREMITY (HCC): Primary | ICD-10-CM

## 2025-02-12 LAB
ALBUMIN SERPL-MCNC: 4.4 G/DL (ref 3.2–4.6)
ALBUMIN/GLOB SERPL: 1.7 (ref 1–1.9)
ALP SERPL-CCNC: 142 U/L (ref 35–104)
ALT SERPL-CCNC: 13 U/L (ref 12–65)
ANION GAP SERPL CALC-SCNC: 12 MMOL/L (ref 7–16)
AST SERPL-CCNC: 15 U/L (ref 15–37)
BASOPHILS # BLD: 0.04 K/UL (ref 0–0.2)
BASOPHILS NFR BLD: 0.8 % (ref 0–2)
BILIRUB SERPL-MCNC: 0.2 MG/DL (ref 0–1.2)
BUN SERPL-MCNC: 22 MG/DL (ref 8–23)
CALCIUM SERPL-MCNC: 9.3 MG/DL (ref 8.8–10.2)
CHLORIDE SERPL-SCNC: 101 MMOL/L (ref 98–107)
CO2 SERPL-SCNC: 26 MMOL/L (ref 20–29)
CREAT SERPL-MCNC: 0.59 MG/DL (ref 0.8–1.3)
DIFFERENTIAL METHOD BLD: ABNORMAL
EKG ATRIAL RATE: 90 BPM
EKG DIAGNOSIS: NORMAL
EKG P AXIS: 69 DEGREES
EKG P-R INTERVAL: 142 MS
EKG Q-T INTERVAL: 357 MS
EKG QRS DURATION: 97 MS
EKG QTC CALCULATION (BAZETT): 430 MS
EKG R AXIS: 37 DEGREES
EKG T AXIS: 61 DEGREES
EKG VENTRICULAR RATE: 87 BPM
EOSINOPHIL # BLD: 0.38 K/UL (ref 0–0.8)
EOSINOPHIL NFR BLD: 7.2 % (ref 0.5–7.8)
ERYTHROCYTE [DISTWIDTH] IN BLOOD BY AUTOMATED COUNT: 14.5 % (ref 11.9–14.6)
FLUAV RNA SPEC QL NAA+PROBE: NOT DETECTED
FLUBV RNA SPEC QL NAA+PROBE: NOT DETECTED
GLOBULIN SER CALC-MCNC: 2.6 G/DL (ref 2.3–3.5)
GLUCOSE SERPL-MCNC: 103 MG/DL (ref 65–100)
HCT VFR BLD AUTO: 35.8 % (ref 35.8–46.3)
HGB BLD-MCNC: 11.5 G/DL (ref 11.7–15.4)
IMM GRANULOCYTES # BLD AUTO: 0.01 K/UL (ref 0–0.5)
IMM GRANULOCYTES NFR BLD AUTO: 0.2 % (ref 0–5)
LYMPHOCYTES # BLD: 1.52 K/UL (ref 0.5–4.6)
LYMPHOCYTES NFR BLD: 29 % (ref 13–44)
MCH RBC QN AUTO: 26.1 PG (ref 26.1–32.9)
MCHC RBC AUTO-ENTMCNC: 32.1 G/DL (ref 31.4–35)
MCV RBC AUTO: 81.4 FL (ref 82–102)
MONOCYTES # BLD: 0.46 K/UL (ref 0.1–1.3)
MONOCYTES NFR BLD: 8.8 % (ref 4–12)
NEUTS SEG # BLD: 2.84 K/UL (ref 1.7–8.2)
NEUTS SEG NFR BLD: 54 % (ref 43–78)
NRBC # BLD: 0 K/UL (ref 0–0.2)
PLATELET # BLD AUTO: 314 K/UL (ref 150–450)
PMV BLD AUTO: 9.1 FL (ref 9.4–12.3)
POTASSIUM SERPL-SCNC: 4 MMOL/L (ref 3.5–5.1)
PROT SERPL-MCNC: 7 G/DL (ref 6.3–8.2)
RBC # BLD AUTO: 4.4 M/UL (ref 4.05–5.2)
SARS-COV-2 RDRP RESP QL NAA+PROBE: NOT DETECTED
SODIUM SERPL-SCNC: 139 MMOL/L (ref 133–143)
SOURCE: NORMAL
TROPONIN T SERPL HS-MCNC: 12.3 NG/L (ref 0–14)
TROPONIN T SERPL HS-MCNC: 9.2 NG/L (ref 0–14)
WBC # BLD AUTO: 5.3 K/UL (ref 4.3–11.1)

## 2025-02-12 PROCEDURE — 85025 COMPLETE CBC W/AUTO DIFF WBC: CPT

## 2025-02-12 PROCEDURE — 6370000000 HC RX 637 (ALT 250 FOR IP): Performed by: EMERGENCY MEDICINE

## 2025-02-12 PROCEDURE — 80053 COMPREHEN METABOLIC PANEL: CPT

## 2025-02-12 PROCEDURE — 6360000002 HC RX W HCPCS: Performed by: EMERGENCY MEDICINE

## 2025-02-12 PROCEDURE — 84484 ASSAY OF TROPONIN QUANT: CPT

## 2025-02-12 PROCEDURE — 93971 EXTREMITY STUDY: CPT

## 2025-02-12 PROCEDURE — 99285 EMERGENCY DEPT VISIT HI MDM: CPT

## 2025-02-12 PROCEDURE — 87635 SARS-COV-2 COVID-19 AMP PRB: CPT

## 2025-02-12 PROCEDURE — 71045 X-RAY EXAM CHEST 1 VIEW: CPT

## 2025-02-12 PROCEDURE — 96374 THER/PROPH/DIAG INJ IV PUSH: CPT

## 2025-02-12 PROCEDURE — 93005 ELECTROCARDIOGRAM TRACING: CPT | Performed by: EMERGENCY MEDICINE

## 2025-02-12 PROCEDURE — 93010 ELECTROCARDIOGRAM REPORT: CPT | Performed by: INTERNAL MEDICINE

## 2025-02-12 PROCEDURE — 87502 INFLUENZA DNA AMP PROBE: CPT

## 2025-02-12 RX ORDER — TRAMADOL HYDROCHLORIDE 50 MG/1
100 TABLET ORAL
Status: COMPLETED | OUTPATIENT
Start: 2025-02-12 | End: 2025-02-12

## 2025-02-12 RX ADMIN — FENTANYL CITRATE 50 MCG: 50 INJECTION INTRAMUSCULAR; INTRAVENOUS at 18:44

## 2025-02-12 RX ADMIN — TRAMADOL HYDROCHLORIDE 100 MG: 50 TABLET ORAL at 20:12

## 2025-02-12 ASSESSMENT — PAIN DESCRIPTION - ORIENTATION
ORIENTATION: LEFT

## 2025-02-12 ASSESSMENT — PAIN SCALES - GENERAL
PAINLEVEL_OUTOF10: 9
PAINLEVEL_OUTOF10: 9
PAINLEVEL_OUTOF10: 8

## 2025-02-12 ASSESSMENT — PAIN DESCRIPTION - DESCRIPTORS: DESCRIPTORS: HEAVINESS

## 2025-02-12 ASSESSMENT — PAIN DESCRIPTION - LOCATION
LOCATION: LEG

## 2025-02-12 ASSESSMENT — PAIN - FUNCTIONAL ASSESSMENT
PAIN_FUNCTIONAL_ASSESSMENT: NONE - DENIES PAIN
PAIN_FUNCTIONAL_ASSESSMENT: 0-10

## 2025-02-12 ASSESSMENT — LIFESTYLE VARIABLES
HOW MANY STANDARD DRINKS CONTAINING ALCOHOL DO YOU HAVE ON A TYPICAL DAY: PATIENT DOES NOT DRINK
HOW OFTEN DO YOU HAVE A DRINK CONTAINING ALCOHOL: NEVER

## 2025-02-12 NOTE — ED PROVIDER NOTES
Emergency Department Provider Note       PCP: Zain Atkins Jr., MD   Age: 64 y.o.   Sex: female     DISPOSITION Decision To Discharge 02/12/2025 08:30:28 PM    ICD-10-CM    1. Left leg pain  M79.605       2. Myalgia  M79.10           Medical Decision Making     Patient is being evaluated for multiple complaints.  She complains of left lower extremity pain in her calf and thigh after recent knee surgery.  She had outpatient ultrasound performed today which was negative for DVT.  However she also complains of bodyaches, chills, subjective fevers with chest pain and shortness of breath.  Broad workup will be performed looking for potential cause of her symptoms including influenza, pneumonia, ACS, or other process.  ED Course as of 02/12/25 2031 Wed Feb 12, 2025 2030 Patient's work appears been negative to include no sign of any elevation in infectious markers.  2 negative troponins.  Unremarkable chest x-ray.  Pain is better controlled after oral and IV pain medications.  She is comfortable discharge.  She is to follow-up with her doctors. [TONI]      ED Course User Index  [TONI] Bobbi Harris, DO     1 or more acute illnesses that pose a threat to life or bodily function.   Patient was discharged risks and benefits of hospitalization were considered.  Shared medical decision making was utilized in creating the patients health plan today.  I independently ordered and reviewed each unique test.         ED cardiac monitoring rhythm strip was ordered and interpreted:  sinus rhythm, no evidence of an arrhythmia  ST Segments:Normal ST segments - NO STEMI   Rate: 87  I interpreted the X-rays chest x-ray negative for infiltrate, pulmonary edema, pleural effusion.              History     Patient is a 64-year-old female presenting with multiple complaints.  She states that she had recent knee surgery and been having pain in her left lower leg in the calf and thigh.  She followed up at the clinic and they had  a stat ultrasound performed to look for signs of DVT.  This was negative.  She states that she has been in a lot of pain over the past couple days.  She also complains that she has been having subjective fevers and chills with bodyaches chest pain shortness of breath.  She states she is has no pain in the knee itself where the surgery was performed and denies any overlying skin changes.    The history is provided by the patient.     Physical Exam     Vitals signs and nursing note reviewed:  Vitals:    02/12/25 1854 02/12/25 1900 02/12/25 1929 02/12/25 2012   BP: 128/83 121/87 139/86    Pulse: 94 88 84    Resp: 21 12 15 19   Temp:       TempSrc:       SpO2: 96% 95% 98%    Weight:       Height:          Physical Exam  Vitals and nursing note reviewed.   Constitutional:       General: She is not in acute distress.     Appearance: Normal appearance. She is not ill-appearing, toxic-appearing or diaphoretic.   HENT:      Head: Normocephalic and atraumatic.      Right Ear: Tympanic membrane normal.      Left Ear: Tympanic membrane normal.      Nose: Nose normal. No congestion or rhinorrhea.      Mouth/Throat:      Mouth: Mucous membranes are moist.      Pharynx: Oropharynx is clear. No oropharyngeal exudate or posterior oropharyngeal erythema.   Eyes:      Extraocular Movements: Extraocular movements intact.      Pupils: Pupils are equal, round, and reactive to light.   Cardiovascular:      Rate and Rhythm: Normal rate and regular rhythm.      Pulses: Normal pulses.   Pulmonary:      Effort: Pulmonary effort is normal. No respiratory distress.      Breath sounds: Normal breath sounds. No stridor. No wheezing, rhonchi or rales.   Chest:      Chest wall: No tenderness.   Abdominal:      General: Abdomen is flat. Bowel sounds are normal. There is no distension.      Palpations: Abdomen is soft. There is no mass.      Tenderness: There is no abdominal tenderness.   Musculoskeletal:         General: No swelling, tenderness,

## 2025-02-12 NOTE — ED TRIAGE NOTES
Patient ambulatory to triage with wheeled walker and CO leg swelling and pain despite pain medication s/p knee replacement. Reports negative US today. Reports new left arm pain.

## 2025-02-13 ENCOUNTER — TELEPHONE (OUTPATIENT)
Age: 65
End: 2025-02-13

## 2025-02-13 NOTE — TELEPHONE ENCOUNTER
Nurse called pt back regarding my chart message \"see below\" pt said she  had to goto the Er yest. Due to knee pain and also was having some cp and left arm pain, they did an EKG and it was read by Dr Saunders, Pt wants to know if she should be worried or concerned about the EKG they did on her. Er  did not tell her to f/u with us. Nurse will check with Dr Lewis and then will let pt know what to do.

## 2025-02-13 NOTE — TELEPHONE ENCOUNTER
My Chart message:Dr. Lewis,     This is Karla Hart,  1960. I was in terrible pain last night (inside part of my left calf, left groin area, and left arm). My left Total knee replacement was performed on 2025. Staples were removed  at [facility].     The pain started  and over the last few days, I could put less and less weight on my surgical leg. An ultrasound yesterday showed no evidence of a DVT.      When the pain approached a 9, I went to the ER on [address]. Here is the EKG from my visit and was read by a cardiologist within [facility] last night. I am attaching a copy for you. Please advise if I need to schedule an appointment with you for next steps. I am continuing my pain tablets and elevating my left leg for the moment. Thank you!     Mona Hart

## 2025-02-26 DIAGNOSIS — I10 HYPERTENSION: ICD-10-CM

## 2025-02-26 DIAGNOSIS — I20.0 ACCELERATING ANGINA (HCC): ICD-10-CM

## 2025-02-26 DIAGNOSIS — I48.91 ATRIAL FIBRILLATION (HCC): ICD-10-CM

## 2025-02-26 RX ORDER — IVABRADINE 5 MG/1
5 TABLET, FILM COATED ORAL 2 TIMES DAILY WITH MEALS
Qty: 180 TABLET | Refills: 3 | Status: SHIPPED | OUTPATIENT
Start: 2025-02-26

## 2025-03-07 ENCOUNTER — HOSPITAL ENCOUNTER (OUTPATIENT)
Dept: PHYSICAL THERAPY | Age: 65
Setting detail: RECURRING SERIES
Discharge: HOME | End: 2025-03-10
Payer: MEDICARE

## 2025-03-07 DIAGNOSIS — M62.81 MUSCLE WEAKNESS (GENERALIZED): ICD-10-CM

## 2025-03-07 DIAGNOSIS — M25.562 LEFT KNEE PAIN, UNSPECIFIED CHRONICITY: Primary | ICD-10-CM

## 2025-03-07 DIAGNOSIS — M25.462 SWELLING OF LEFT KNEE JOINT: ICD-10-CM

## 2025-03-07 DIAGNOSIS — R26.2 DIFFICULTY IN WALKING, NOT ELSEWHERE CLASSIFIED: ICD-10-CM

## 2025-03-07 PROCEDURE — 97110 THERAPEUTIC EXERCISES: CPT

## 2025-03-07 PROCEDURE — 97161 PT EVAL LOW COMPLEX 20 MIN: CPT

## 2025-03-07 PROCEDURE — 97140 MANUAL THERAPY 1/> REGIONS: CPT

## 2025-03-07 ASSESSMENT — PAIN SCALES - GENERAL: PAINLEVEL_OUTOF10: 9

## 2025-03-07 NOTE — PROGRESS NOTES
Karla Hart  : 1960  Primary: Medicare Part A And B (Medicare)  Secondary: [facility] [place]  [address]  [facility] [address]  Phone: [phone]  Fax: [phone] Plan Frequency: 2 per week for 90 days (Decreasing frequency to 1 time per week as medically appropriate. Patient will be out of country for personal reasons in a few weeks for a couple of weeks.)  Plan of Care/Certification Expiration Date: 25 (Plant of Care Start Date 3/7/2025)        Plan of Care/Certification Expiration Date:  Plan of Care/Certification Expiration Date: 25 (Plant of Care Start Date 3/7/2025)    Frequency/Duration: Plan Frequency: 2 per week for 90 days (Decreasing frequency to 1 time per week as medically appropriate. Patient will be out of country for personal reasons in a few weeks for a couple of weeks.)      Time In/Out:   Time In: 0800  Time Out: 0900      PT Visit Info:    Progress Note Counter: 1      Visit Count:  1    OUTPATIENT PHYSICAL THERAPY:   Treatment Note 3/7/2025       Episode  (Left TKA)               Treatment Diagnosis:    Left knee pain, unspecified chronicity  Difficulty in walking, not elsewhere classified  Muscle weakness (generalized)  Swelling of left knee joint  Medical/Referring Diagnosis:    Unilateral primary osteoarthritis, left knee    Referring Physician:  Bill Penyn MD MD Orders:  PT Eval and Treat , Continue ROM and Strengthening  Return MD Appt:  3/13/2025   Date of Onset:  Onset Date: 25   Date of Surgery  Allergies:   Crab extract, Cephalexin, Morphine, Morphine, Oxycodone-acetaminophen, Zolpidem, Adhesive tape, and Cephalexin  Restrictions/Precautions:   Past Medical History includes Anxiety, Ashtma, Type II Diabetes, High Blood Pressure, Peripheral Neuropathy, Incontinence, Osteoporosis.      Interventions Planned (Treatment may consist of any combination of the following):     See

## 2025-03-07 NOTE — THERAPY EVALUATION
Mona Hart's therapy, I certify that the treatment plan above will be carried out by a therapist or under their direction.  Thank you for this referral,  Dru Zambrano PT     Referring Physician Signature: Bill Penny, * _______________________________ Date _____________        Charge Capture  Events  Appt Desk  Attendance Report

## 2025-03-14 ENCOUNTER — HOSPITAL ENCOUNTER (OUTPATIENT)
Dept: PHYSICAL THERAPY | Age: 65
Setting detail: RECURRING SERIES
Discharge: HOME | End: 2025-03-17
Payer: MEDICARE

## 2025-03-14 PROCEDURE — 97110 THERAPEUTIC EXERCISES: CPT

## 2025-03-14 PROCEDURE — 97140 MANUAL THERAPY 1/> REGIONS: CPT

## 2025-03-14 ASSESSMENT — PAIN SCALES - GENERAL: PAINLEVEL_OUTOF10: 5

## 2025-03-14 NOTE — PROGRESS NOTES
Karla Hart  : 1960  Primary: Medicare Part A And B (Medicare)  Secondary: [facility] [place]  [address]  [facility] [address]  Phone: [phone]  Fax: [phone] Plan Frequency: 2 per week for 90 days (Decreasing frequency to 1 time per week as medically appropriate. Patient will be out of country for personal reasons in a few weeks for a couple of weeks.)  Plan of Care/Certification Expiration Date: 25 (Plant of Care Start Date 3/7/2025)        Plan of Care/Certification Expiration Date:  Plan of Care/Certification Expiration Date: 25 (Plant of Care Start Date 3/7/2025)    Frequency/Duration: Plan Frequency: 2 per week for 90 days (Decreasing frequency to 1 time per week as medically appropriate. Patient will be out of country for personal reasons in a few weeks for a couple of weeks.)      Time In/Out:   Time In: 0800  Time Out: 0900      PT Visit Info:    Progress Note Counter: 2      Visit Count:  2    OUTPATIENT PHYSICAL THERAPY:   Treatment Note 3/14/2025       Episode  (Left TKA)               Treatment Diagnosis:    Left knee pain, unspecified chronicity  Difficulty in walking, not elsewhere classified  Muscle weakness (generalized)  Swelling of left knee joint  Medical/Referring Diagnosis:    Unilateral primary osteoarthritis, left knee    Referring Physician:  Bill Penny MD MD Orders:  PT Eval and Treat , Continue ROM and Strengthening  Return MD Appt:  3/13/2025   Date of Onset:  Onset Date: 25   Date of Surgery  Allergies:   Crab extract, Cephalexin, Morphine, Morphine, Oxycodone-acetaminophen, Zolpidem, Adhesive tape, and Cephalexin  Restrictions/Precautions:   Past Medical History includes Anxiety, Ashtma, Type II Diabetes, High Blood Pressure, Peripheral Neuropathy, Incontinence, Osteoporosis.      Interventions Planned (Treatment may consist of any combination of the following):     See

## 2025-03-17 ENCOUNTER — HOSPITAL ENCOUNTER (OUTPATIENT)
Dept: PHYSICAL THERAPY | Age: 65
Setting detail: RECURRING SERIES
Discharge: HOME | End: 2025-03-20
Payer: MEDICARE

## 2025-03-17 PROCEDURE — 97140 MANUAL THERAPY 1/> REGIONS: CPT

## 2025-03-17 PROCEDURE — 97110 THERAPEUTIC EXERCISES: CPT

## 2025-03-17 ASSESSMENT — PAIN SCALES - GENERAL: PAINLEVEL_OUTOF10: 5

## 2025-03-17 NOTE — PROGRESS NOTES
Karla Hart  : 1960  Primary: Medicare Part A And B (Medicare)  Secondary: [facility] [place]  Amol ELI A  [facility] [address]  Phone: [phone]  Fax: [phone] Plan Frequency: 2 per week for 90 days (Decreasing frequency to 1 time per week as medically appropriate. Patient will be out of country for personal reasons in a few weeks for a couple of weeks.)  Plan of Care/Certification Expiration Date: 25 (Plant of Care Start Date 3/7/2025)        Plan of Care/Certification Expiration Date:  Plan of Care/Certification Expiration Date: 25 (Plant of Care Start Date 3/7/2025)    Frequency/Duration: Plan Frequency: 2 per week for 90 days (Decreasing frequency to 1 time per week as medically appropriate. Patient will be out of country for personal reasons in a few weeks for a couple of weeks.)      Time In/Out:   Time In: 1435  Time Out: 1538      PT Visit Info:    Progress Note Counter: 3      Visit Count:  3    OUTPATIENT PHYSICAL THERAPY:   Treatment Note 3/17/2025       Episode  (Left TKA)               Treatment Diagnosis:    Left knee pain, unspecified chronicity  Difficulty in walking, not elsewhere classified  Muscle weakness (generalized)  Swelling of left knee joint  Medical/Referring Diagnosis:    Unilateral primary osteoarthritis, left knee    Referring Physician:  Bill Penny MD MD Orders:  PT Eval and Treat , Continue ROM and Strengthening  Return MD Appt:  3/13/2025   Date of Onset:  Onset Date: 25   Date of Surgery  Allergies:   Crab extract, Cephalexin, Morphine, Morphine, Oxycodone-acetaminophen, Zolpidem, Adhesive tape, and Cephalexin  Restrictions/Precautions:   Past Medical History includes Anxiety, Ashtma, Type II Diabetes, High Blood Pressure, Peripheral Neuropathy, Incontinence, Osteoporosis.      Interventions Planned (Treatment may consist of any combination of the following):     See

## 2025-03-24 ENCOUNTER — HOSPITAL ENCOUNTER (OUTPATIENT)
Dept: PHYSICAL THERAPY | Age: 65
Setting detail: RECURRING SERIES
Discharge: HOME | End: 2025-03-27
Payer: MEDICARE

## 2025-03-24 PROCEDURE — 97140 MANUAL THERAPY 1/> REGIONS: CPT

## 2025-03-24 PROCEDURE — 97110 THERAPEUTIC EXERCISES: CPT

## 2025-03-24 ASSESSMENT — PAIN SCALES - GENERAL: PAINLEVEL_OUTOF10: 5

## 2025-03-24 NOTE — PROGRESS NOTES
Assessment Note    Subjective Comments:   Patient reports that her ankle brace is still helping. She reports that her knee is improving. She reports that she leaves tomorrow to go to [place] to handle personal issues.  Initial Pain Level:     5/10  Post Session Pain Level:      3/10  Medications Last Reviewed: 3/24/2025  Updated Objective Findings:   AROM: Left Knee 0 - 118.  Treatment   THERAPEUTIC EXERCISE: (23 minutes):    Exercises per grid below to improve mobility, strength, balance, and coordination.  Required moderate visual, verbal, manual, and tactile cues to promote proper body alignment, promote proper body posture, promote proper body mechanics, and promote proper body breathing techniques.  Progressed resistance, range, repetitions, and complexity of movement as indicated.   Date:  3/24/2025 Date:  3/14/2025 Date:  3/17/2025   Activity/Exercise Parameters Parameters Parameters   Quad Set Supine 10 sec x 20 Long Sitting 10 sec x 10 Long Sitting 10 sec x 10   Hip Adduction  20 sec x 10 Hooklying 20 sec x 10 Hooklying   Hip Abduciton  Hooklying 3 x 20 Yellow 20 sec x 10 Hooklying   Transfers      SLR Long Sitting 3 x 10 Small ROM Long Sitting 3 x 10 Small ROM Long Sitting 1 x 10 Small ROM   LAQ Seated 3 x 10 Seated 3 x 10 Seated 3 x 10   Sit to Stand 3 x 10 High Mat Table     Knee Flexion Standing 4 inch Step Bilateral 3 x 10 Long Sitting Passive Therapist Assist 3 x 10 Long Sitting Passive Therapist assist 8 mins     Time spent with patient reviewing proper muscle recruitment and technique with exercises.     MANUAL THERAPY: (30 minutes):   Joint mobilization and Soft tissue mobilization was utilized and necessary because of the patient's restricted joint motion, painful spasm, loss of articular motion, and restricted motion of soft tissue.   Patellofemoral Mobilization Grade II/III Supine  Soft Tissue Mobilization Gentle Quadriceps (to facilitate correct quad activation), Adductors, Anterior Tibialis,

## 2025-04-14 ENCOUNTER — HOSPITAL ENCOUNTER (OUTPATIENT)
Dept: PHYSICAL THERAPY | Age: 65
Setting detail: RECURRING SERIES
Discharge: HOME | End: 2025-04-17
Payer: MEDICARE

## 2025-04-14 PROCEDURE — 97140 MANUAL THERAPY 1/> REGIONS: CPT

## 2025-04-14 PROCEDURE — 97110 THERAPEUTIC EXERCISES: CPT

## 2025-04-14 ASSESSMENT — PAIN SCALES - GENERAL: PAINLEVEL_OUTOF10: 6

## 2025-04-14 NOTE — PROGRESS NOTES
Karla Hart  : 1960  Primary: Medicare Part A And B (Medicare)  Secondary: [facility] [place]  Amol ELI A  [facility] [address]  Phone: [phone]  Fax: [phone] Plan Frequency: 2 per week for 90 days (Decreasing frequency to 1 time per week as medically appropriate. Patient will be out of country for personal reasons in a few weeks for a couple of weeks.)  Plan of Care/Certification Expiration Date: 25 (Plant of Care Start Date 3/7/2025)        Plan of Care/Certification Expiration Date:  Plan of Care/Certification Expiration Date: 25 (Plant of Care Start Date 3/7/2025)    Frequency/Duration: Plan Frequency: 2 per week for 90 days (Decreasing frequency to 1 time per week as medically appropriate. Patient will be out of country for personal reasons in a few weeks for a couple of weeks.)      Time In/Out:   Time In: 1100  Time Out: 1158      PT Visit Info:    Progress Note Counter: 5      Visit Count:  5    OUTPATIENT PHYSICAL THERAPY:   Treatment Note 2025       Episode  (Left TKA)               Treatment Diagnosis:    Left knee pain, unspecified chronicity  Difficulty in walking, not elsewhere classified  Muscle weakness (generalized)  Swelling of left knee joint  Medical/Referring Diagnosis:    Unilateral primary osteoarthritis, left knee    Referring Physician:  Bill Penny MD MD Orders:  PT Eval and Treat , Continue ROM and Strengthening  Return MD Appt:  3/13/2025   Date of Onset:  Onset Date: 25   Date of Surgery  Allergies:   Crab extract, Cephalexin, Morphine, Morphine, Oxycodone-acetaminophen, Zolpidem, Adhesive tape, and Cephalexin  Restrictions/Precautions:   Past Medical History includes Anxiety, Ashtma, Type II Diabetes, High Blood Pressure, Peripheral Neuropathy, Incontinence, Osteoporosis.      Interventions Planned (Treatment may consist of any combination of the following):     See

## 2025-04-18 ENCOUNTER — HOSPITAL ENCOUNTER (OUTPATIENT)
Dept: PHYSICAL THERAPY | Age: 65
Setting detail: RECURRING SERIES
End: 2025-04-18
Payer: MEDICARE

## 2025-04-21 ENCOUNTER — HOSPITAL ENCOUNTER (OUTPATIENT)
Dept: PHYSICAL THERAPY | Age: 65
Setting detail: RECURRING SERIES
Discharge: HOME | End: 2025-04-24
Payer: MEDICARE

## 2025-04-21 PROCEDURE — 97140 MANUAL THERAPY 1/> REGIONS: CPT

## 2025-04-21 PROCEDURE — 97110 THERAPEUTIC EXERCISES: CPT

## 2025-04-21 ASSESSMENT — PAIN SCALES - GENERAL: PAINLEVEL_OUTOF10: 5

## 2025-04-21 NOTE — PROGRESS NOTES
Karla Hart  : 1960  Primary: Medicare Part A And B (Medicare)  Secondary: [facility] [place]  Amol ELI A  [facility] [address]  Phone: [phone]  Fax: [phone] Plan Frequency: 2 per week for 90 days (Decreasing frequency to 1 time per week as medically appropriate. Patient will be out of country for personal reasons in a few weeks for a couple of weeks.)  Plan of Care/Certification Expiration Date: 25 (Plant of Care Start Date 3/7/2025)        Plan of Care/Certification Expiration Date:  Plan of Care/Certification Expiration Date: 25 (Plant of Care Start Date 3/7/2025)    Frequency/Duration: Plan Frequency: 2 per week for 90 days (Decreasing frequency to 1 time per week as medically appropriate. Patient will be out of country for personal reasons in a few weeks for a couple of weeks.)      Time In/Out:   Time In: 1100  Time Out: 1159      PT Visit Info:    Progress Note Counter: 6      Visit Count:  6    OUTPATIENT PHYSICAL THERAPY:   Treatment Note 2025       Episode  (Left TKA)               Treatment Diagnosis:    Left knee pain, unspecified chronicity  Difficulty in walking, not elsewhere classified  Muscle weakness (generalized)  Swelling of left knee joint  Medical/Referring Diagnosis:    Unilateral primary osteoarthritis, left knee    Referring Physician:  Bill Penny MD MD Orders:  PT Eval and Treat , Continue ROM and Strengthening  Return MD Appt:  3/13/2025   Date of Onset:  Onset Date: 25   Date of Surgery  Allergies:   Crab extract, Cephalexin, Morphine, Morphine, Oxycodone-acetaminophen, Zolpidem, Adhesive tape, and Cephalexin  Restrictions/Precautions:   Past Medical History includes Anxiety, Ashtma, Type II Diabetes, High Blood Pressure, Peripheral Neuropathy, Incontinence, Osteoporosis.      Interventions Planned (Treatment may consist of any combination of the following):     See

## 2025-04-24 ENCOUNTER — HOSPITAL ENCOUNTER (OUTPATIENT)
Dept: MAMMOGRAPHY | Age: 65
Discharge: HOME | End: 2025-04-27
Payer: MEDICARE

## 2025-04-24 VITALS — HEIGHT: 67 IN | BODY MASS INDEX: 31.23 KG/M2 | WEIGHT: 199 LBS

## 2025-04-24 DIAGNOSIS — Z12.31 SCREENING MAMMOGRAM FOR BREAST CANCER: ICD-10-CM

## 2025-04-24 PROCEDURE — 77063 BREAST TOMOSYNTHESIS BI: CPT

## 2025-04-25 ENCOUNTER — HOSPITAL ENCOUNTER (OUTPATIENT)
Dept: PHYSICAL THERAPY | Age: 65
Setting detail: RECURRING SERIES
Discharge: HOME | End: 2025-04-28
Payer: MEDICARE

## 2025-04-25 PROCEDURE — 97110 THERAPEUTIC EXERCISES: CPT

## 2025-04-25 PROCEDURE — 97140 MANUAL THERAPY 1/> REGIONS: CPT

## 2025-04-25 ASSESSMENT — PAIN SCALES - GENERAL: PAINLEVEL_OUTOF10: 3

## 2025-04-25 NOTE — PROGRESS NOTES
Assessment Note    Subjective Comments:   Patient reports that she is feeling better, antibiotics are helping. She reports her knee is improving. Her pain is not in the knee but still has pain her tibia and distal MCL.  Initial Pain Level:     3/10  Post Session Pain Level:      3/10  Medications Last Reviewed: 4/25/2025  Updated Objective Findings:   Mobility: Anterior Interval Left Hypomobility.  Treatment   THERAPEUTIC EXERCISE: (40 minutes):    Exercises per grid below to improve mobility, strength, balance, and coordination.  Required moderate visual, verbal, manual, and tactile cues to promote proper body alignment, promote proper body posture, promote proper body mechanics, and promote proper body breathing techniques.  Progressed resistance, range, repetitions, and complexity of movement as indicated.   Date:  4/25/2025 Date:  4/14/2025 Date:  4/21/2025   Activity/Exercise Parameters Parameters Parameters   Quad Set  Long Sitting 5 x 30 sec Long Sitting 10 sec x 10   Hip Adduction      Hip Abduciton      Transfers      SLR Long Sitting 3 x 10 Long Sitting 3 x 10 Small ROM    LAQ  Seated 3 x 10    Sit to Stand Chair 3 x 10 NO UE     Knee Flexion  Long Sitting Passive Therapist Assist 3 x 10 Sitting Edge of Mat Passive Therapist assist 8 mins   Shuttle  50 lbs Double Leg 3 x 15    Hamstring Stretch   6 x 30 sec with Strap 6 x 30 sec with Strap   Calf Stretch Slant Board 3 x 60 sec  6 x 30 sec with Strap 6 x 30 sec with Strap Each Leg Right and Left   Nu Step 10 minutes Level 1 Working on ROM and Posture   5 minutes Level 1 Working on ROM   Knee Extension Terminal Knee Extension 3 x 10 Green Single UE Support      Step Up 1 x 10 Each Leg Bilateral UE Support 6 inch       Time spent with patient reviewing proper muscle recruitment and technique with exercises.     MANUAL THERAPY: (15 minutes):   Joint mobilization and Soft tissue mobilization was utilized and necessary because of the patient's restricted joint

## 2025-04-28 ENCOUNTER — HOSPITAL ENCOUNTER (OUTPATIENT)
Dept: PHYSICAL THERAPY | Age: 65
Setting detail: RECURRING SERIES
Discharge: HOME | End: 2025-05-01
Payer: MEDICARE

## 2025-04-28 PROCEDURE — 97140 MANUAL THERAPY 1/> REGIONS: CPT

## 2025-04-28 PROCEDURE — 97110 THERAPEUTIC EXERCISES: CPT

## 2025-04-28 ASSESSMENT — PAIN SCALES - GENERAL: PAINLEVEL_OUTOF10: 3

## 2025-04-28 NOTE — THERAPY RECERTIFICATION
to demonstrate improved self pain control and tolerance. -ONGOING  Patient will be educated in and demonstrate improved upright posture including sitting, standing, walking, transfers to improve safety and performance with all daily activities.-ONGOING  Patient will be educated in and demonstrate proper squat lift technique in order to reduce stress on left knee, improve safety, and reduce risk of injury.-ONGOING  Patient will improve DF Knee Extended to 10 degrees to assist with stairs, steps, curbs.-ONGOING  Discharge Goals: Time Frame: 4/28/2025 to 7/27/2025  Patient will improve Knee Flexion to 120 degrees to assist with return to all bending and squatting at home, and in the community and to assist with return to traveling.-ONGOING  Patient will improve strength to 4+/5 to assist with return to walking without assistive device.-ONGOING  Patient will improve TUG to less then 14 seconds to decrease risk of falling with daily activities.-ONGOING  Patient will improve strength to be able to go up and down stairs with single UE support to be able to navigate home in the  and in [place].-ONGOING  Patient will improve Lower Extremity Functional Scale score to 39/80 from 19/80.-ONGOING         Medical Necessity:   > Patient is expected to demonstrate progress in strength, range of motion, balance, coordination, and functional technique to improve safety and performance of all activities at home in the [place] and in [place], and in the community.  > Skilled intervention continues to be required due to increased pain, swelling, decreased mobility, flexibility, ROM, strength, balance, and function.  Reason For Services/Other Comments:  > Patient continues to require skilled intervention due to increased pain and swelling, decreased mobility, flexibility, ROM, strength, balance, gait, and function.      Regarding Karla Hart's therapy, I certify that the treatment plan above will be carried out by a therapist or under

## 2025-04-28 NOTE — PROGRESS NOTES
Karla Hart  : 1960  Primary: Medicare Part A And B (Medicare)  Secondary: [facility] [place]  Amol ELI A  [facility] [address]  Phone: [phone]  Fax: [phone] Plan Frequency: 2 per week for 90 days (Decreasing frequency to 1 time per week as medically appropriate. Patient will be out of country for personal reasons in a few weeks for a couple of weeks.)  Plan of Care/Certification Expiration Date: 25        Plan of Care/Certification Expiration Date:  Plan of Care/Certification Expiration Date: 25    Frequency/Duration: Plan Frequency: 2 per week for 90 days (Decreasing frequency to 1 time per week as medically appropriate. Patient will be out of country for personal reasons in a few weeks for a couple of weeks.)      Time In/Out:   Time In: 1100  Time Out: 1200      PT Visit Info:    Progress Note Counter: 8      Visit Count:  8    OUTPATIENT PHYSICAL THERAPY:   Treatment Note 2025       Episode  (Left TKA)               Treatment Diagnosis:    Left knee pain, unspecified chronicity  Difficulty in walking, not elsewhere classified  Muscle weakness (generalized)  Swelling of left knee joint  Medical/Referring Diagnosis:    Unilateral primary osteoarthritis, left knee    Referring Physician:  Bill Penny MD MD Orders:  PT Eval and Treat , Continue ROM and Strengthening  Return MD Appt:  3/13/2025   Date of Onset:  Onset Date: 25   Date of Surgery  Allergies:   Crab extract, Cephalexin, Morphine, Morphine, Oxycodone-acetaminophen, Zolpidem, Adhesive tape, and Cephalexin  Restrictions/Precautions:   Past Medical History includes Anxiety, Ashtma, Type II Diabetes, High Blood Pressure, Peripheral Neuropathy, Incontinence, Osteoporosis.      Interventions Planned (Treatment may consist of any combination of the following):     See Assessment Note    Subjective Comments:   Patient reports that she will

## 2025-07-10 ENCOUNTER — HOSPITAL ENCOUNTER (OUTPATIENT)
Dept: PHYSICAL THERAPY | Age: 65
Setting detail: RECURRING SERIES
Discharge: HOME | End: 2025-07-13
Payer: MEDICARE

## 2025-07-10 PROCEDURE — 97110 THERAPEUTIC EXERCISES: CPT

## 2025-07-10 ASSESSMENT — PAIN SCALES - GENERAL: PAINLEVEL_OUTOF10: 0

## 2025-07-10 NOTE — PROGRESS NOTES
Karla Hart  : 1960  Primary: Medicare Part A And B (Medicare)  Secondary: [facility] [place]  Amol ELI A  [facility] [address]  Phone: [phone]  Fax: [phone] Plan Frequency: 2 per week for 90 days (Decreasing frequency to 1 time per week as medically appropriate. Patient will be out of country for personal reasons in a few weeks for a couple of weeks.)  Plan of Care/Certification Expiration Date: 25        Plan of Care/Certification Expiration Date:  Plan of Care/Certification Expiration Date: 25    Frequency/Duration: Plan Frequency: 2 per week for 90 days (Decreasing frequency to 1 time per week as medically appropriate. Patient will be out of country for personal reasons in a few weeks for a couple of weeks.)      Time In/Out:   Time In: 1325  Time Out: 1422      PT Visit Info:    Progress Note Counter: 1      Visit Count:  9    OUTPATIENT PHYSICAL THERAPY:   Treatment Note 7/10/2025       Episode  (Left TKA)               Treatment Diagnosis:    Left knee pain, unspecified chronicity  Difficulty in walking, not elsewhere classified  Muscle weakness (generalized)  Swelling of left knee joint  Medical/Referring Diagnosis:    Unilateral primary osteoarthritis, left knee    Referring Physician:  Bill Penny MD MD Orders:  PT Eval and Treat , Continue ROM and Strengthening  Return MD Appt:  3/13/2025   Date of Onset:  Onset Date: 25   Date of Surgery  Allergies:   Crab extract, Cephalexin, Morphine, Morphine, Oxycodone-acetaminophen, Zolpidem, Adhesive tape, and Cephalexin  Restrictions/Precautions:   Past Medical History includes Anxiety, Ashtma, Type II Diabetes, High Blood Pressure, Peripheral Neuropathy, Incontinence, Osteoporosis.      Interventions Planned (Treatment may consist of any combination of the following):     See Assessment Note    Subjective Comments:   Patient reports that she did

## 2025-07-14 ENCOUNTER — HOSPITAL ENCOUNTER (OUTPATIENT)
Dept: GENERAL RADIOLOGY | Age: 65
Discharge: HOME | End: 2025-07-16
Payer: MEDICARE

## 2025-07-14 ENCOUNTER — OFFICE VISIT (OUTPATIENT)
Dept: PULMONOLOGY | Age: 65
End: 2025-07-14
Payer: MEDICARE

## 2025-07-14 VITALS
HEIGHT: 67 IN | WEIGHT: 205 LBS | BODY MASS INDEX: 32.18 KG/M2 | OXYGEN SATURATION: 97 % | DIASTOLIC BLOOD PRESSURE: 77 MMHG | SYSTOLIC BLOOD PRESSURE: 114 MMHG | RESPIRATION RATE: 14 BRPM | HEART RATE: 90 BPM

## 2025-07-14 DIAGNOSIS — R06.00 DYSPNEA, UNSPECIFIED TYPE: ICD-10-CM

## 2025-07-14 DIAGNOSIS — G47.33 OSA ON CPAP: ICD-10-CM

## 2025-07-14 DIAGNOSIS — J40 BRONCHITIS: ICD-10-CM

## 2025-07-14 DIAGNOSIS — J45.909 UNCOMPLICATED ASTHMA, UNSPECIFIED ASTHMA SEVERITY, UNSPECIFIED WHETHER PERSISTENT: Primary | ICD-10-CM

## 2025-07-14 PROCEDURE — G8417 CALC BMI ABV UP PARAM F/U: HCPCS | Performed by: INTERNAL MEDICINE

## 2025-07-14 PROCEDURE — 1036F TOBACCO NON-USER: CPT | Performed by: INTERNAL MEDICINE

## 2025-07-14 PROCEDURE — 3017F COLORECTAL CA SCREEN DOC REV: CPT | Performed by: INTERNAL MEDICINE

## 2025-07-14 PROCEDURE — 3078F DIAST BP <80 MM HG: CPT | Performed by: INTERNAL MEDICINE

## 2025-07-14 PROCEDURE — 71046 X-RAY EXAM CHEST 2 VIEWS: CPT

## 2025-07-14 PROCEDURE — G8427 DOCREV CUR MEDS BY ELIG CLIN: HCPCS | Performed by: INTERNAL MEDICINE

## 2025-07-14 PROCEDURE — 99214 OFFICE O/P EST MOD 30 MIN: CPT | Performed by: INTERNAL MEDICINE

## 2025-07-14 PROCEDURE — 3074F SYST BP LT 130 MM HG: CPT | Performed by: INTERNAL MEDICINE

## 2025-07-14 RX ORDER — PREDNISONE 10 MG/1
TABLET ORAL
Qty: 30 TABLET | Refills: 0 | Status: SHIPPED | OUTPATIENT
Start: 2025-07-14

## 2025-07-14 RX ORDER — CHLOPHEDIANOL HCL AND PYRILAMINE MALEATE 12.5; 12.5 MG/5ML; MG/5ML
SOLUTION ORAL
Qty: 250 ML | Refills: 0 | Status: SHIPPED | OUTPATIENT
Start: 2025-07-14

## 2025-07-14 NOTE — PROGRESS NOTES
Name:  Karla Hart  YOB: 1960   MRN: [id number]      Office Visit: 2025       Assessment & Plan (Medical Decision Making)    Impression: 64 y.o. female with sleep apnea and asthma here for follow-up    1. Uncomplicated asthma, unspecified asthma severity, unspecified whether persistent  Continue Trelegy and albuterol, no wheezing on exam    2. SARI on CPAP  Continue CPAP    3. Bronchitis  Prednisone tapering dose, ninja cough as needed  - XR CHEST (2 VW); Future    Orders Placed This Encounter   Medications    predniSONE (DELTASONE) 10 MG tablet     Sig: Take 4 tabs x 3 days then 3 tabs x 3 days then 2 tabs x 3 days then 1 tab x 3 days then stop.     Dispense:  30 tablet     Refill:  0    Chlophedianol-Pyrilamine (NINJACOF) 12.5-12.5 MG/5ML LIQD     Si teaspoon q6-8h, do not exceed 6 teaspoon in 24 hours     Dispense:  250 mL     Refill:  0     No orders of the defined types were placed in this encounter.    Follow-up and Dispositions    Return in about 1 year (around 2026) for CXR.       Mack Rubio Jr, MD    No specialty comments available.  No problem-specific Assessment & Plan notes found for this encounter.              Total time for encounter on day of encounter was 36 minutes.  This time includes chart prep, review of tests/procedures, review of other provider's notes, documentation and counseling patient regarding disease process and medications.   _________________________________________________________________________    HISTORY OF PRESENT ILLNESS:    Ms. Karla Hart is a 64 y.o. female who is seen at [facility] for  Asthma  This is a 64-year-old female with a history of hypertension, diabetes, reflux, asthma, sleep apnea   Seen a year ago because of shortness of breath.  Since that time she notes that she has been on CPAP nightly.  She also has been using Trelegy and albuterol.  Over the summer she went [place] and

## 2025-07-17 ENCOUNTER — HOSPITAL ENCOUNTER (OUTPATIENT)
Dept: PHYSICAL THERAPY | Age: 65
Setting detail: RECURRING SERIES
Discharge: HOME | End: 2025-07-20
Payer: MEDICARE

## 2025-07-17 PROCEDURE — 97110 THERAPEUTIC EXERCISES: CPT

## 2025-07-17 PROCEDURE — 97140 MANUAL THERAPY 1/> REGIONS: CPT

## 2025-07-17 ASSESSMENT — PAIN SCALES - GENERAL: PAINLEVEL_OUTOF10: 0

## 2025-07-17 NOTE — PROGRESS NOTES
Karla Hart  : 1960  Primary: Medicare Part A And B (Medicare)  Secondary: [facility] [place]  Amol ELI A  [facility] [address]  Phone: [phone]  Fax: [phone] Plan Frequency: 2 per week for 90 days (Decreasing frequency to 1 time per week as medically appropriate. Patient will be out of country for personal reasons in a few weeks for a couple of weeks.)  Plan of Care/Certification Expiration Date: 25        Plan of Care/Certification Expiration Date:  Plan of Care/Certification Expiration Date: 25    Frequency/Duration: Plan Frequency: 2 per week for 90 days (Decreasing frequency to 1 time per week as medically appropriate. Patient will be out of country for personal reasons in a few weeks for a couple of weeks.)      Time In/Out:   Time In: 1000  Time Out: 1100      PT Visit Info:    Progress Note Counter: 2      Visit Count:  10    OUTPATIENT PHYSICAL THERAPY:   Treatment Note 2025       Episode  (Left TKA)               Treatment Diagnosis:    Left knee pain, unspecified chronicity  Difficulty in walking, not elsewhere classified  Muscle weakness (generalized)  Swelling of left knee joint  Medical/Referring Diagnosis:    Unilateral primary osteoarthritis, left knee    Referring Physician:  Bill Penny MD MD Orders:  PT Eval and Treat , Continue ROM and Strengthening  Return MD Appt:  3/13/2025   Date of Onset:  Onset Date: 25   Date of Surgery  Allergies:   Crab extract, Cephalexin, Morphine, Morphine, Oxycodone-acetaminophen, Zolpidem, Adhesive tape, and Cephalexin  Restrictions/Precautions:   Past Medical History includes Anxiety, Ashtma, Type II Diabetes, High Blood Pressure, Peripheral Neuropathy, Incontinence, Osteoporosis.      Interventions Planned (Treatment may consist of any combination of the following):     See Assessment Note    Subjective Comments:   Patient reports that her knee

## 2025-07-24 ENCOUNTER — HOSPITAL ENCOUNTER (OUTPATIENT)
Dept: PHYSICAL THERAPY | Age: 65
Setting detail: RECURRING SERIES
Discharge: HOME | End: 2025-07-27
Payer: MEDICARE

## 2025-07-24 PROCEDURE — 97140 MANUAL THERAPY 1/> REGIONS: CPT

## 2025-07-24 PROCEDURE — 97110 THERAPEUTIC EXERCISES: CPT

## 2025-07-24 ASSESSMENT — PAIN SCALES - GENERAL: PAINLEVEL_OUTOF10: 0

## 2025-07-24 NOTE — PROGRESS NOTES
Karla Hart  : 1960  Primary: Medicare Part A And B (Medicare)  Secondary: [facility] [place]  Amol ELI A  [facility] [address]  Phone: [phone]  Fax: [phone] Plan Frequency: 2 per week for 90 days (Decreasing frequency to 1 time per week as medically appropriate. Patient will be out of country for personal reasons in a few weeks for a couple of weeks.)  Plan of Care/Certification Expiration Date: 10/22/25        Plan of Care/Certification Expiration Date:  Plan of Care/Certification Expiration Date: 10/22/25    Frequency/Duration: Plan Frequency: 2 per week for 90 days (Decreasing frequency to 1 time per week as medically appropriate. Patient will be out of country for personal reasons in a few weeks for a couple of weeks.)      Time In/Out:   Time In: 1000  Time Out: 1100      PT Visit Info:    Progress Note Counter: 3      Visit Count:  11    OUTPATIENT PHYSICAL THERAPY:   Treatment Note 2025       Episode  (Left TKA)               Treatment Diagnosis:    Left knee pain, unspecified chronicity  Difficulty in walking, not elsewhere classified  Muscle weakness (generalized)  Swelling of left knee joint  Medical/Referring Diagnosis:    Unilateral primary osteoarthritis, left knee    Referring Physician:  Bill Penny MD MD Orders:  PT Eval and Treat , Continue ROM and Strengthening  Return MD Appt:  3/13/2025   Date of Onset:  Onset Date: 25   Date of Surgery  Allergies:   Crab extract, Cephalexin, Morphine, Morphine, Oxycodone-acetaminophen, Zolpidem, Adhesive tape, and Cephalexin  Restrictions/Precautions:   Past Medical History includes Anxiety, Ashtma, Type II Diabetes, High Blood Pressure, Peripheral Neuropathy, Incontinence, Osteoporosis.      Interventions Planned (Treatment may consist of any combination of the following):     See Assessment Note    Subjective Comments:   Patient reports that she was

## 2025-07-24 NOTE — THERAPY RECERTIFICATION
Karla Hart  : 1960  Primary: Medicare Part A And B (Medicare)  Secondary: [facility] [place]  Amol ELI A  [facility] [address]  Phone: [phone]  Fax: [phone] Plan Frequency: 2 per week for 90 days (Decreasing frequency to 1 time per week as medically appropriate. Patient will be out of country for personal reasons in a few weeks for a couple of weeks.)    Plan of Care/Certification Expiration Date: 10/22/25        Plan of Care/Certification Expiration Date:  Plan of Care/Certification Expiration Date: 10/22/25    Frequency/Duration: Plan Frequency: 2 per week for 90 days (Decreasing frequency to 1 time per week as medically appropriate. Patient will be out of country for personal reasons in a few weeks for a couple of weeks.)      Time In/Out:   Time In: 1000  Time Out: 1100      PT Visit Info:    Progress Note Counter: 3      Visit Count:  11                OUTPATIENT PHYSICAL THERAPY:             Recertification 2025               Episode (Left TKA)         Treatment Diagnosis:     Left knee pain, unspecified chronicity  Difficulty in walking, not elsewhere classified  Muscle weakness (generalized)  Swelling of left knee joint  Medical/Referring Diagnosis:    Unilateral primary osteoarthritis, left knee    Referring Physician:  Bill Penny MD MD Orders:  PT Eval and Treat, Continue ROM and Strengthening.  Return MD Appt:  3/13/2025  Date of Onset:  Onset Date: 25   Date of Surgery  Allergies:  Crab extract, Cephalexin, Morphine, Morphine, Oxycodone-acetaminophen, Zolpidem, Adhesive tape, and Cephalexin  Restrictions/Precautions:    Past Medical History includes Anxiety, Ashtma, Type II Diabetes, High Blood Pressure, Peripheral Neuropathy, Incontinence, Osteoporosis.       Medications Last Reviewed: 2025      OBJECTIVE     Patient denies any LE paresthesia. Patient denies any increase of symptoms

## 2025-07-31 ENCOUNTER — HOSPITAL ENCOUNTER (OUTPATIENT)
Dept: PHYSICAL THERAPY | Age: 65
Setting detail: RECURRING SERIES
End: 2025-07-31
Payer: MEDICARE

## 2025-07-31 PROCEDURE — 97140 MANUAL THERAPY 1/> REGIONS: CPT

## 2025-07-31 PROCEDURE — 97110 THERAPEUTIC EXERCISES: CPT

## 2025-07-31 ASSESSMENT — PAIN SCALES - GENERAL: PAINLEVEL_OUTOF10: 0

## 2025-07-31 NOTE — PROGRESS NOTES
Karla Hart  : 1960  Primary: Medicare Part A And B (Medicare)  Secondary: [facility] [place]  Amol ELI A  [facility] [address]  Phone: [phone]  Fax: [phone] Plan Frequency: 2 per week for 90 days (Decreasing frequency to 1 time per week as medically appropriate. Patient will be out of country for personal reasons in a few weeks for a couple of weeks.)  Plan of Care/Certification Expiration Date: 10/22/25        Plan of Care/Certification Expiration Date:  Plan of Care/Certification Expiration Date: 10/22/25    Frequency/Duration: Plan Frequency: 2 per week for 90 days (Decreasing frequency to 1 time per week as medically appropriate. Patient will be out of country for personal reasons in a few weeks for a couple of weeks.)      Time In/Out:   Time In: 1000  Time Out: 1057      PT Visit Info:    Progress Note Counter: 1      Visit Count:  12    OUTPATIENT PHYSICAL THERAPY:   Treatment Note 2025       Episode  (Left TKA)               Treatment Diagnosis:    Left knee pain, unspecified chronicity  Difficulty in walking, not elsewhere classified  Muscle weakness (generalized)  Swelling of left knee joint  Medical/Referring Diagnosis:    Unilateral primary osteoarthritis, left knee    Referring Physician:  Bill Penny MD MD Orders:  PT Eval and Treat , Continue ROM and Strengthening  Return MD Appt:  3/13/2025   Date of Onset:  Onset Date: 25   Date of Surgery  Allergies:   Crab extract, Cephalexin, Morphine, Morphine, Oxycodone-acetaminophen, Zolpidem, Adhesive tape, and Cephalexin  Restrictions/Precautions:   Past Medical History includes Anxiety, Ashtma, Type II Diabetes, High Blood Pressure, Peripheral Neuropathy, Incontinence, Osteoporosis.      Interventions Planned (Treatment may consist of any combination of the following):     See Assessment Note    Subjective Comments:   Patient reports that she is

## 2025-08-07 ENCOUNTER — HOSPITAL ENCOUNTER (OUTPATIENT)
Dept: PHYSICAL THERAPY | Age: 65
Setting detail: RECURRING SERIES
Discharge: HOME | End: 2025-08-10
Payer: MEDICARE

## 2025-08-07 PROCEDURE — 97140 MANUAL THERAPY 1/> REGIONS: CPT

## 2025-08-07 PROCEDURE — 97110 THERAPEUTIC EXERCISES: CPT

## 2025-08-07 ASSESSMENT — PAIN SCALES - GENERAL: PAINLEVEL_OUTOF10: 0

## 2025-08-11 ENCOUNTER — OFFICE VISIT (OUTPATIENT)
Dept: SLEEP MEDICINE | Age: 65
End: 2025-08-11
Payer: MEDICARE

## 2025-08-11 VITALS
HEIGHT: 67 IN | WEIGHT: 206 LBS | SYSTOLIC BLOOD PRESSURE: 98 MMHG | HEART RATE: 86 BPM | BODY MASS INDEX: 32.33 KG/M2 | RESPIRATION RATE: 14 BRPM | OXYGEN SATURATION: 97 % | DIASTOLIC BLOOD PRESSURE: 60 MMHG

## 2025-08-11 DIAGNOSIS — G47.34 NOCTURNAL HYPOXEMIA: ICD-10-CM

## 2025-08-11 DIAGNOSIS — G47.33 OSA (OBSTRUCTIVE SLEEP APNEA): Primary | ICD-10-CM

## 2025-08-11 PROCEDURE — 3074F SYST BP LT 130 MM HG: CPT | Performed by: PHYSICIAN ASSISTANT

## 2025-08-11 PROCEDURE — 3017F COLORECTAL CA SCREEN DOC REV: CPT | Performed by: PHYSICIAN ASSISTANT

## 2025-08-11 PROCEDURE — G8417 CALC BMI ABV UP PARAM F/U: HCPCS | Performed by: PHYSICIAN ASSISTANT

## 2025-08-11 PROCEDURE — G8427 DOCREV CUR MEDS BY ELIG CLIN: HCPCS | Performed by: PHYSICIAN ASSISTANT

## 2025-08-11 PROCEDURE — 1036F TOBACCO NON-USER: CPT | Performed by: PHYSICIAN ASSISTANT

## 2025-08-11 PROCEDURE — 3078F DIAST BP <80 MM HG: CPT | Performed by: PHYSICIAN ASSISTANT

## 2025-08-11 PROCEDURE — 99214 OFFICE O/P EST MOD 30 MIN: CPT | Performed by: PHYSICIAN ASSISTANT

## 2025-08-11 ASSESSMENT — SLEEP AND FATIGUE QUESTIONNAIRES
HOW LIKELY ARE YOU TO NOD OFF OR FALL ASLEEP WHILE SITTING AND READING: MODERATE CHANCE OF DOZING
HOW LIKELY ARE YOU TO NOD OFF OR FALL ASLEEP WHILE WATCHING TV: SLIGHT CHANCE OF DOZING
ESS TOTAL SCORE: 7
HOW LIKELY ARE YOU TO NOD OFF OR FALL ASLEEP WHILE SITTING AND TALKING TO SOMEONE: WOULD NEVER DOZE
HOW LIKELY ARE YOU TO NOD OFF OR FALL ASLEEP WHILE SITTING AND TALKING TO SOMEONE: WOULD NEVER DOZE
HOW LIKELY ARE YOU TO NOD OFF OR FALL ASLEEP WHILE WATCHING TV: SLIGHT CHANCE OF DOZING
HOW LIKELY ARE YOU TO NOD OFF OR FALL ASLEEP IN A CAR, WHILE STOPPED FOR A FEW MINUTES IN TRAFFIC: WOULD NEVER DOZE
HOW LIKELY ARE YOU TO NOD OFF OR FALL ASLEEP WHEN YOU ARE A PASSENGER IN A CAR FOR AN HOUR WITHOUT A BREAK: WOULD NEVER DOZE
HOW LIKELY ARE YOU TO NOD OFF OR FALL ASLEEP WHILE SITTING INACTIVE IN A PUBLIC PLACE: WOULD NEVER DOZE
HOW LIKELY ARE YOU TO NOD OFF OR FALL ASLEEP IN A CAR, WHILE STOPPED FOR A FEW MINUTES IN TRAFFIC: WOULD NEVER DOZE
HOW LIKELY ARE YOU TO NOD OFF OR FALL ASLEEP WHILE SITTING QUIETLY AFTER LUNCH WITHOUT ALCOHOL: SLIGHT CHANCE OF DOZING
HOW LIKELY ARE YOU TO NOD OFF OR FALL ASLEEP WHILE SITTING QUIETLY AFTER LUNCH WITHOUT ALCOHOL: SLIGHT CHANCE OF DOZING
HOW LIKELY ARE YOU TO NOD OFF OR FALL ASLEEP WHILE SITTING INACTIVE IN A PUBLIC PLACE: WOULD NEVER DOZE
HOW LIKELY ARE YOU TO NOD OFF OR FALL ASLEEP WHILE LYING DOWN TO REST IN THE AFTERNOON WHEN CIRCUMSTANCES PERMIT: HIGH CHANCE OF DOZING
HOW LIKELY ARE YOU TO NOD OFF OR FALL ASLEEP WHILE SITTING AND READING: MODERATE CHANCE OF DOZING
HOW LIKELY ARE YOU TO NOD OFF OR FALL ASLEEP WHEN YOU ARE A PASSENGER IN A CAR FOR AN HOUR WITHOUT A BREAK: WOULD NEVER DOZE
HOW LIKELY ARE YOU TO NOD OFF OR FALL ASLEEP WHILE LYING DOWN TO REST IN THE AFTERNOON WHEN CIRCUMSTANCES PERMIT: HIGH CHANCE OF DOZING

## 2025-08-14 ENCOUNTER — APPOINTMENT (OUTPATIENT)
Dept: PHYSICAL THERAPY | Age: 65
End: 2025-08-14
Payer: MEDICARE

## 2025-08-21 ENCOUNTER — HOSPITAL ENCOUNTER (OUTPATIENT)
Dept: PHYSICAL THERAPY | Age: 65
Setting detail: RECURRING SERIES
Discharge: HOME | End: 2025-08-24
Payer: MEDICARE

## 2025-08-21 PROCEDURE — 97140 MANUAL THERAPY 1/> REGIONS: CPT

## 2025-08-21 PROCEDURE — 97110 THERAPEUTIC EXERCISES: CPT

## 2025-08-21 ASSESSMENT — PAIN SCALES - GENERAL: PAINLEVEL_OUTOF10: 0

## (undated) DEVICE — INTENDED FOR TISSUE SEPARATION, AND OTHER PROCEDURES THAT REQUIRE A SHARP SURGICAL BLADE TO PUNCTURE OR CUT.: Brand: BARD-PARKER SAFETY BLADES SIZE 10, STERILE

## (undated) DEVICE — CANISTER, RIGID, 2000CC: Brand: MEDLINE INDUSTRIES, INC.

## (undated) DEVICE — DRAPE SHT 3 QTR PROXIMA 53X77 --

## (undated) DEVICE — BAND COMPR L24CM REG CLR PLAS HEMSTAT EXT HK AND LOOP RETEN

## (undated) DEVICE — CASPAR DISTR PIN12MMSTER: Brand: AESCULAP

## (undated) DEVICE — SUTURE VCRL SZ 3-0 L18IN ABSRB UD CP-2 L26MM 1/2 CIR REV J761D

## (undated) DEVICE — SYR 10ML LUER LOK 1/5ML GRAD --

## (undated) DEVICE — INSULATED BLADE ELECTRODE: Brand: EDGE

## (undated) DEVICE — SYRINGE IRRIG 60ML SFT PLIABLE BLB EZ TO GRP 1 HND USE W/

## (undated) DEVICE — CATHETER DIAG AD 5FR L100CM COR GRY HYDRPHLC NYL MPA 2 W/ 2

## (undated) DEVICE — GLIDESHEATH SLENDER STAINLESS STEEL KIT: Brand: GLIDESHEATH SLENDER

## (undated) DEVICE — GUIDEWIRE 035IN 210CM PTFE COAT FIX COR J TIP 15MM FIRM BODY

## (undated) DEVICE — SUTURE MCRYL SZ 4-0 L27IN ABSRB UD L19MM PS-2 1/2 CIR PRIM Y426H

## (undated) DEVICE — BIT DRILL 2.3X12MM OZARK CERVICAL SYSTEM

## (undated) DEVICE — KIT EVAC 0.13IN RECT TB DIA10FR 400CC PVC 3 SPR Y CONN DRN

## (undated) DEVICE — COVER US PRB W15XL120CM W/ GEL RUBBERBAND TAPE STRP FLD GEN

## (undated) DEVICE — NEEDLE SPNL 22GA L3.5IN BLK HUB S STL REG WALL FIT STYL W/

## (undated) DEVICE — SPONGE: SPECIALTY PEANUT XR 100/CS: Brand: MEDICAL ACTION INDUSTRIES

## (undated) DEVICE — CONTAINER,SPECIMEN,O.R.STRL,4.5OZ: Brand: MEDLINE

## (undated) DEVICE — TUBING, SUCTION, 1/4" X 10', STRAIGHT: Brand: MEDLINE

## (undated) DEVICE — DRAPE MICSCP DISPOSABLE

## (undated) DEVICE — SOL IRR SOD CL 0.9% 1000ML BTL --

## (undated) DEVICE — SEALANT HEMOSTAT W/THROM 8ML -- SURGIFLO MATRIX

## (undated) DEVICE — SURGIFOAM SPNG SZ 100

## (undated) DEVICE — CARBIDE MATCH HEAD

## (undated) DEVICE — SUTURE VCRL SZ 2-0 L18IN ABSRB UD L26MM CP-2 1/2 CIR REV J762D

## (undated) DEVICE — C-ARM: Brand: UNBRANDED

## (undated) DEVICE — PERCUTANEOUS ENTRY THINWALL NEEDLE  ONE-PART: Brand: COOK

## (undated) DEVICE — SPONGE,NEURO,0.5"X0.5",XR,STRL,10/PK: Brand: MEDLINE

## (undated) DEVICE — CATHETER IV 14GA L1.25IN PTFE ORNG FEP SFTY STR HUB RADPQ

## (undated) DEVICE — DISPOSABLE STANDARD BIPOLAR FORCEPS, NON-STICK,: Brand: SPETZLER-MALIS

## (undated) DEVICE — CATHETER THRMDIL 7FR L110CM STD PULM ART 4 INFUS LUMN SWN

## (undated) DEVICE — SYR 3ML LL TIP 1/10ML GRAD --

## (undated) DEVICE — SPINE NEURO: Brand: MEDLINE INDUSTRIES, INC.

## (undated) DEVICE — DERMABOND SKIN ADH 0.7ML -- DERMABOND ADVANCED 12/BX

## (undated) DEVICE — TOTAL TRAY, DB, 100% SILI FOLEY, 16FR 10: Brand: MEDLINE